# Patient Record
Sex: FEMALE | Race: BLACK OR AFRICAN AMERICAN | Employment: FULL TIME | URBAN - METROPOLITAN AREA
[De-identification: names, ages, dates, MRNs, and addresses within clinical notes are randomized per-mention and may not be internally consistent; named-entity substitution may affect disease eponyms.]

---

## 2018-08-18 ENCOUNTER — HOSPITAL ENCOUNTER (OUTPATIENT)
Age: 58
Discharge: HOME | End: 2018-08-18
Payer: COMMERCIAL

## 2018-08-18 LAB
ALT SERPL-CCNC: 14 U/L (ref 5–33)
ANION GAP SERPL CALCULATED.3IONS-SCNC: 10 MMOL/L (ref 9–17)
BUN BLDV-MCNC: 14 MG/DL (ref 6–20)
BUN/CREAT BLD: ABNORMAL (ref 9–20)
CALCIUM SERPL-MCNC: 8.8 MG/DL (ref 8.6–10.4)
CHLORIDE BLD-SCNC: 106 MMOL/L (ref 98–107)
CHOLESTEROL/HDL RATIO: 4.8
CHOLESTEROL: 183 MG/DL
CO2: 23 MMOL/L (ref 20–31)
CREAT SERPL-MCNC: 0.59 MG/DL (ref 0.5–0.9)
GFR AFRICAN AMERICAN: >60 ML/MIN
GFR NON-AFRICAN AMERICAN: >60 ML/MIN
GFR SERPL CREATININE-BSD FRML MDRD: ABNORMAL ML/MIN/{1.73_M2}
GFR SERPL CREATININE-BSD FRML MDRD: ABNORMAL ML/MIN/{1.73_M2}
GLUCOSE BLD-MCNC: 154 MG/DL (ref 70–99)
HDLC SERPL-MCNC: 38 MG/DL
LDL CHOLESTEROL: 120 MG/DL (ref 0–130)
POTASSIUM SERPL-SCNC: 4.3 MMOL/L (ref 3.7–5.3)
SODIUM BLD-SCNC: 139 MMOL/L (ref 135–144)
TRIGL SERPL-MCNC: 123 MG/DL
VLDLC SERPL CALC-MCNC: ABNORMAL MG/DL (ref 1–30)

## 2018-08-18 PROCEDURE — 80061 LIPID PANEL: CPT

## 2018-08-18 PROCEDURE — 84460 ALANINE AMINO (ALT) (SGPT): CPT

## 2018-08-18 PROCEDURE — 36415 COLL VENOUS BLD VENIPUNCTURE: CPT

## 2018-08-18 PROCEDURE — 80048 BASIC METABOLIC PNL TOTAL CA: CPT

## 2018-08-24 ENCOUNTER — HOSPITAL ENCOUNTER (OUTPATIENT)
Dept: VASCULAR LAB | Age: 58
Discharge: HOME | End: 2018-08-24
Payer: COMMERCIAL

## 2018-08-24 PROCEDURE — 93880 EXTRACRANIAL BILAT STUDY: CPT

## 2018-08-25 ENCOUNTER — HOSPITAL ENCOUNTER (OUTPATIENT)
Age: 58
Discharge: HOME | End: 2018-08-25
Payer: COMMERCIAL

## 2018-08-25 PROCEDURE — 83036 HEMOGLOBIN GLYCOSYLATED A1C: CPT

## 2018-08-25 PROCEDURE — 36415 COLL VENOUS BLD VENIPUNCTURE: CPT

## 2018-08-26 LAB
ESTIMATED AVERAGE GLUCOSE: 186 MG/DL
HBA1C MFR BLD: 8.1 % (ref 4–6)

## 2018-09-07 ENCOUNTER — TELEPHONE (OUTPATIENT)
Dept: GASTROENTEROLOGY | Age: 58
End: 2018-09-07

## 2018-09-17 ENCOUNTER — HOSPITAL ENCOUNTER (OUTPATIENT)
Dept: WOMENS IMAGING | Age: 58
Discharge: HOME | End: 2018-09-19
Payer: COMMERCIAL

## 2018-09-17 DIAGNOSIS — Z12.31 VISIT FOR SCREENING MAMMOGRAM: ICD-10-CM

## 2018-09-17 PROCEDURE — 77063 BREAST TOMOSYNTHESIS BI: CPT

## 2018-10-05 DIAGNOSIS — Z12.11 ENCOUNTER FOR SCREENING COLONOSCOPY: Primary | ICD-10-CM

## 2018-10-11 ENCOUNTER — TELEPHONE (OUTPATIENT)
Dept: GASTROENTEROLOGY | Age: 58
End: 2018-10-11

## 2018-10-11 NOTE — TELEPHONE ENCOUNTER
Writer TIMMY to call office back to confirm procedure for [email or web address] Jill Graham @new time of 1215pm and arrive by 1045am.

## 2018-10-12 ENCOUNTER — HOSPITAL ENCOUNTER (OUTPATIENT)
Age: 58
Setting detail: OUTPATIENT SURGERY
Discharge: HOME | End: 2018-10-12
Attending: INTERNAL MEDICINE | Admitting: INTERNAL MEDICINE
Payer: COMMERCIAL

## 2018-10-12 VITALS
OXYGEN SATURATION: 98 % | RESPIRATION RATE: 18 BRPM | HEART RATE: 68 BPM | TEMPERATURE: 97.9 F | HEIGHT: 60 IN | DIASTOLIC BLOOD PRESSURE: 67 MMHG | BODY MASS INDEX: 39.27 KG/M2 | SYSTOLIC BLOOD PRESSURE: 118 MMHG | WEIGHT: 200 LBS

## 2018-10-12 LAB — GLUCOSE BLD-MCNC: 87 MG/DL (ref 65–105)

## 2018-10-12 PROCEDURE — 82947 ASSAY GLUCOSE BLOOD QUANT: CPT

## 2018-10-12 PROCEDURE — 99152 MOD SED SAME PHYS/QHP 5/>YRS: CPT | Performed by: INTERNAL MEDICINE

## 2018-10-12 PROCEDURE — 2709999900 HC NON-CHARGEABLE SUPPLY: Performed by: INTERNAL MEDICINE

## 2018-10-12 PROCEDURE — 3609027000 HC COLONOSCOPY: Performed by: INTERNAL MEDICINE

## 2018-10-12 PROCEDURE — 6360000002 HC RX W HCPCS: Performed by: INTERNAL MEDICINE

## 2018-10-12 PROCEDURE — 99153 MOD SED SAME PHYS/QHP EA: CPT | Performed by: INTERNAL MEDICINE

## 2018-10-12 PROCEDURE — 7100000010 HC PHASE II RECOVERY - FIRST 15 MIN: Performed by: INTERNAL MEDICINE

## 2018-10-12 PROCEDURE — 2580000003 HC RX 258: Performed by: INTERNAL MEDICINE

## 2018-10-12 PROCEDURE — 7100000011 HC PHASE II RECOVERY - ADDTL 15 MIN: Performed by: INTERNAL MEDICINE

## 2018-10-12 RX ORDER — AMLODIPINE BESYLATE 5 MG/1
5 TABLET ORAL DAILY
COMMUNITY
End: 2019-07-01 | Stop reason: ALTCHOICE

## 2018-10-12 RX ORDER — FENTANYL CITRATE 50 UG/ML
INJECTION, SOLUTION INTRAMUSCULAR; INTRAVENOUS PRN
Status: DISCONTINUED | OUTPATIENT
Start: 2018-10-12 | End: 2018-10-12 | Stop reason: HOSPADM

## 2018-10-12 RX ORDER — SODIUM CHLORIDE 9 MG/ML
INJECTION, SOLUTION INTRAVENOUS CONTINUOUS
Status: DISCONTINUED | OUTPATIENT
Start: 2018-10-12 | End: 2018-10-12 | Stop reason: HOSPADM

## 2018-10-12 RX ORDER — MIDAZOLAM HYDROCHLORIDE 1 MG/ML
INJECTION INTRAMUSCULAR; INTRAVENOUS PRN
Status: DISCONTINUED | OUTPATIENT
Start: 2018-10-12 | End: 2018-10-12 | Stop reason: HOSPADM

## 2018-10-12 RX ADMIN — SODIUM CHLORIDE: 9 INJECTION, SOLUTION INTRAVENOUS at 10:46

## 2018-10-12 ASSESSMENT — PAIN - FUNCTIONAL ASSESSMENT: PAIN_FUNCTIONAL_ASSESSMENT: 0-10

## 2018-10-12 ASSESSMENT — PAIN SCALES - GENERAL
PAINLEVEL_OUTOF10: 0

## 2019-06-11 ENCOUNTER — OFFICE VISIT (OUTPATIENT)
Dept: FAMILY MEDICINE CLINIC | Age: 59
End: 2019-06-11
Payer: COMMERCIAL

## 2019-06-11 VITALS
DIASTOLIC BLOOD PRESSURE: 84 MMHG | SYSTOLIC BLOOD PRESSURE: 120 MMHG | WEIGHT: 196.4 LBS | BODY MASS INDEX: 38.56 KG/M2 | TEMPERATURE: 98.6 F | HEART RATE: 88 BPM | HEIGHT: 60 IN

## 2019-06-11 DIAGNOSIS — Z12.31 SCREENING MAMMOGRAM, ENCOUNTER FOR: ICD-10-CM

## 2019-06-11 DIAGNOSIS — E11.9 TYPE 2 DIABETES MELLITUS WITHOUT COMPLICATION, WITHOUT LONG-TERM CURRENT USE OF INSULIN (HCC): Primary | ICD-10-CM

## 2019-06-11 DIAGNOSIS — I10 ESSENTIAL HYPERTENSION: ICD-10-CM

## 2019-06-11 LAB
CREATININE URINE POCT: 300
HBA1C MFR BLD: 6.7 %
MICROALBUMIN/CREAT 24H UR: 30 MG/G{CREAT}
MICROALBUMIN/CREAT UR-RTO: <30

## 2019-06-11 PROCEDURE — 99203 OFFICE O/P NEW LOW 30 MIN: CPT | Performed by: FAMILY MEDICINE

## 2019-06-11 PROCEDURE — 83036 HEMOGLOBIN GLYCOSYLATED A1C: CPT | Performed by: FAMILY MEDICINE

## 2019-06-11 PROCEDURE — 82044 UR ALBUMIN SEMIQUANTITATIVE: CPT | Performed by: FAMILY MEDICINE

## 2019-06-11 PROCEDURE — G8427 DOCREV CUR MEDS BY ELIG CLIN: HCPCS | Performed by: FAMILY MEDICINE

## 2019-06-11 PROCEDURE — 3017F COLORECTAL CA SCREEN DOC REV: CPT | Performed by: FAMILY MEDICINE

## 2019-06-11 PROCEDURE — G8417 CALC BMI ABV UP PARAM F/U: HCPCS | Performed by: FAMILY MEDICINE

## 2019-06-11 PROCEDURE — 1036F TOBACCO NON-USER: CPT | Performed by: FAMILY MEDICINE

## 2019-06-11 PROCEDURE — 3044F HG A1C LEVEL LT 7.0%: CPT | Performed by: FAMILY MEDICINE

## 2019-06-11 PROCEDURE — 2022F DILAT RTA XM EVC RTNOPTHY: CPT | Performed by: FAMILY MEDICINE

## 2019-06-11 ASSESSMENT — PATIENT HEALTH QUESTIONNAIRE - PHQ9
1. LITTLE INTEREST OR PLEASURE IN DOING THINGS: 0
SUM OF ALL RESPONSES TO PHQ9 QUESTIONS 1 & 2: 0
SUM OF ALL RESPONSES TO PHQ QUESTIONS 1-9: 0
SUM OF ALL RESPONSES TO PHQ QUESTIONS 1-9: 0
2. FEELING DOWN, DEPRESSED OR HOPELESS: 0

## 2019-06-11 ASSESSMENT — ENCOUNTER SYMPTOMS
SHORTNESS OF BREATH: 0
NAUSEA: 0
SORE THROAT: 0
ABDOMINAL PAIN: 0

## 2019-06-11 NOTE — PROGRESS NOTES
Subjective:      Patient ID: Samuel Liu is a 63 y.o. female. Chronic Disease Visit Information    BP Readings from Last 3 Encounters:   06/11/19 120/84   10/12/18 118/67   11/09/15 138/78          Hemoglobin A1C (%)   Date Value   08/25/2018 8.1 (H)     LDL Cholesterol (mg/dL)   Date Value   08/18/2018 120     HDL (mg/dL)   Date Value   08/18/2018 38 (L)     BUN (mg/dL)   Date Value   08/18/2018 14     CREATININE (mg/dL)   Date Value   08/18/2018 0.59     Glucose (mg/dL)   Date Value   08/18/2018 154 (H)            Have you changed or started any medications since your last visit including any over-the-counter medicines, vitamins, or herbal medicines? no   Are you having any side effects from any of your medications? -  no  Have you stopped taking any of your medications? Is so, why? -  no    Have you seen any other physician or provider since your last visit? No  Have you had any other diagnostic tests since your last visit? No  Have you been seen in the emergency room and/or had an admission to a hospital since we last saw you? No  Have you had your annual diabetic retinal (eye) exam? Yes - Records Requested  Have you had your routine dental cleaning in the past 6 months? yes -     Have you activated your [employer] account? If not, what are your barriers?  No:      Patient Care Team:  Nicolasa Delcid MD as PCP - General (Family Medicine)      Health Maintenance   Topic Date Due    Hepatitis C screen  1960    Pneumococcal 0-64 years Vaccine (1 of 1 - PPSV23) 06/03/1967    Diabetic foot exam  06/03/1971    Diabetic retinal exam  06/03/1971    HIV screen  06/03/1976    Diabetic microalbuminuria test  06/03/1979    Hepatitis B Vaccine (1 of 3 - Risk 3-dose series) 06/03/1980    DTaP/Tdap/Td vaccine (1 - Tdap) 06/03/1980    Cervical cancer screen  06/03/1982    Shingles Vaccine (1 of 2) 06/03/2011    Lipid screen  08/18/2019    A1C test (Diabetic or Prediabetic)  08/25/2019    Flu vaccine (Season without long-term current use of insulin (HCC)   controlled  HM DIABETES FOOT EXAM   2. Essential hypertension   controlled  Lipid Panel    EKG 12 Lead   3. Screening mammogram, encounter for  JOS DIGITAL SCREEN W CAD BILATERAL             Plan:       Orders Placed This Encounter   Procedures    JOS DIGITAL SCREEN W CAD BILATERAL     Standing Status:   Future     Standing Expiration Date:   6/11/2020    Lipid Panel     Standing Status:   Future     Standing Expiration Date:   6/10/2020     Order Specific Question:   Is Patient Fasting?/# of Hours     Answer:   12    Comprehensive Metabolic Panel     Standing Status:   Future     Standing Expiration Date:   6/10/2020    POCT glycosylated hemoglobin (Hb A1C)    POCT microalbumin    EKG 12 Lead     Standing Status:   Future     Standing Expiration Date:   6/11/2020     Order Specific Question:   Reason for Exam?     Answer:   Hypertension    HM DIABETES FOOT EXAM     No orders of the defined types were placed in this encounter. Return in about 4 months (around 10/11/2019) for diabetes. Continue current medications reviewed from the chart    .           Marichuy Marquez MA

## 2019-07-01 ENCOUNTER — TELEPHONE (OUTPATIENT)
Dept: FAMILY MEDICINE CLINIC | Age: 59
End: 2019-07-01

## 2019-07-01 RX ORDER — LISINOPRIL 5 MG/1
5 TABLET ORAL DAILY
Qty: 30 TABLET | Refills: 2 | Status: SHIPPED | OUTPATIENT
Start: 2019-07-01 | End: 2019-09-30 | Stop reason: SDUPTHER

## 2019-07-06 ENCOUNTER — HOSPITAL ENCOUNTER (OUTPATIENT)
Age: 59
Discharge: HOME | End: 2019-07-06
Payer: COMMERCIAL

## 2019-07-06 DIAGNOSIS — E11.9 TYPE 2 DIABETES MELLITUS WITHOUT COMPLICATION, WITHOUT LONG-TERM CURRENT USE OF INSULIN (HCC): ICD-10-CM

## 2019-07-06 DIAGNOSIS — I10 ESSENTIAL HYPERTENSION: ICD-10-CM

## 2019-07-06 LAB
ALBUMIN SERPL-MCNC: 3.7 G/DL (ref 3.5–5.2)
ALBUMIN/GLOBULIN RATIO: ABNORMAL (ref 1–2.5)
ALP BLD-CCNC: 94 U/L (ref 35–104)
ALT SERPL-CCNC: 12 U/L (ref 5–33)
ANION GAP SERPL CALCULATED.3IONS-SCNC: 11 MMOL/L (ref 9–17)
AST SERPL-CCNC: 19 U/L
BILIRUB SERPL-MCNC: 0.43 MG/DL (ref 0.3–1.2)
BUN BLDV-MCNC: 16 MG/DL (ref 6–20)
BUN/CREAT BLD: ABNORMAL (ref 9–20)
CALCIUM SERPL-MCNC: 9.1 MG/DL (ref 8.6–10.4)
CHLORIDE BLD-SCNC: 106 MMOL/L (ref 98–107)
CHOLESTEROL/HDL RATIO: 4.4
CHOLESTEROL: 168 MG/DL
CO2: 23 MMOL/L (ref 20–31)
CREAT SERPL-MCNC: 0.54 MG/DL (ref 0.5–0.9)
GFR AFRICAN AMERICAN: >60 ML/MIN
GFR NON-AFRICAN AMERICAN: >60 ML/MIN
GFR SERPL CREATININE-BSD FRML MDRD: ABNORMAL ML/MIN/{1.73_M2}
GFR SERPL CREATININE-BSD FRML MDRD: ABNORMAL ML/MIN/{1.73_M2}
GLUCOSE BLD-MCNC: 115 MG/DL (ref 70–99)
HDLC SERPL-MCNC: 38 MG/DL
LDL CHOLESTEROL: 105 MG/DL (ref 0–130)
POTASSIUM SERPL-SCNC: 4.8 MMOL/L (ref 3.7–5.3)
SODIUM BLD-SCNC: 140 MMOL/L (ref 135–144)
TOTAL PROTEIN: 7.3 G/DL (ref 6.4–8.3)
TRIGL SERPL-MCNC: 123 MG/DL
VITAMIN D 25-HYDROXY: 16.4 NG/ML (ref 30–100)
VLDLC SERPL CALC-MCNC: ABNORMAL MG/DL (ref 1–30)

## 2019-07-06 PROCEDURE — 36415 COLL VENOUS BLD VENIPUNCTURE: CPT

## 2019-07-06 PROCEDURE — 80053 COMPREHEN METABOLIC PANEL: CPT

## 2019-07-06 PROCEDURE — 82306 VITAMIN D 25 HYDROXY: CPT

## 2019-07-06 PROCEDURE — 80061 LIPID PANEL: CPT

## 2019-07-08 RX ORDER — ERGOCALCIFEROL 1.25 MG/1
50000 CAPSULE ORAL WEEKLY
Qty: 12 CAPSULE | Refills: 0 | Status: SHIPPED | OUTPATIENT
Start: 2019-07-08 | End: 2019-10-17

## 2019-07-27 ENCOUNTER — HOSPITAL ENCOUNTER (OUTPATIENT)
Age: 59
Discharge: HOME | End: 2019-07-29
Payer: COMMERCIAL

## 2019-07-27 DIAGNOSIS — I10 ESSENTIAL HYPERTENSION: ICD-10-CM

## 2019-07-27 PROCEDURE — 93005 ELECTROCARDIOGRAM TRACING: CPT

## 2019-07-30 LAB
EKG ATRIAL RATE: 75 BPM
EKG P AXIS: 47 DEGREES
EKG P-R INTERVAL: 178 MS
EKG Q-T INTERVAL: 426 MS
EKG QRS DURATION: 120 MS
EKG QTC CALCULATION (BAZETT): 475 MS
EKG R AXIS: 90 DEGREES
EKG T AXIS: 33 DEGREES
EKG VENTRICULAR RATE: 75 BPM

## 2019-07-30 PROCEDURE — 93010 ELECTROCARDIOGRAM REPORT: CPT | Performed by: INTERNAL MEDICINE

## 2019-08-01 ENCOUNTER — TELEPHONE (OUTPATIENT)
Dept: FAMILY MEDICINE CLINIC | Age: 59
End: 2019-08-01

## 2019-08-01 DIAGNOSIS — R94.31 ABNORMAL EKG: Primary | ICD-10-CM

## 2019-08-09 ENCOUNTER — HOSPITAL ENCOUNTER (OUTPATIENT)
Dept: NUCLEAR MEDICINE | Age: 59
Discharge: HOME | End: 2019-08-11
Payer: COMMERCIAL

## 2019-08-09 ENCOUNTER — HOSPITAL ENCOUNTER (OUTPATIENT)
Dept: NON INVASIVE DIAGNOSTICS | Age: 59
Discharge: HOME | End: 2019-08-09
Payer: COMMERCIAL

## 2019-08-09 ENCOUNTER — TELEPHONE (OUTPATIENT)
Dept: FAMILY MEDICINE CLINIC | Age: 59
End: 2019-08-09

## 2019-08-09 VITALS — BODY MASS INDEX: 38.28 KG/M2 | HEIGHT: 60 IN | WEIGHT: 195 LBS

## 2019-08-09 DIAGNOSIS — R94.31 ABNORMAL EKG: ICD-10-CM

## 2019-08-09 DIAGNOSIS — R94.31 ABNORMAL ELECTROCARDIOGRAM: ICD-10-CM

## 2019-08-09 LAB
LV EF: 36 %
LVEF MODALITY: NORMAL

## 2019-08-09 PROCEDURE — 78452 HT MUSCLE IMAGE SPECT MULT: CPT

## 2019-08-09 PROCEDURE — 93017 CV STRESS TEST TRACING ONLY: CPT

## 2019-08-09 PROCEDURE — 3430000000 HC RX DIAGNOSTIC RADIOPHARMACEUTICAL: Performed by: FAMILY MEDICINE

## 2019-08-09 PROCEDURE — 2580000003 HC RX 258: Performed by: FAMILY MEDICINE

## 2019-08-09 PROCEDURE — A9500 TC99M SESTAMIBI: HCPCS | Performed by: FAMILY MEDICINE

## 2019-08-09 PROCEDURE — 6360000002 HC RX W HCPCS: Performed by: FAMILY MEDICINE

## 2019-08-09 RX ORDER — ALBUTEROL SULFATE 90 UG/1
2 AEROSOL, METERED RESPIRATORY (INHALATION) PRN
Status: ACTIVE | OUTPATIENT
Start: 2019-08-09 | End: 2019-08-09

## 2019-08-09 RX ORDER — SODIUM CHLORIDE 0.9 % (FLUSH) 0.9 %
10 SYRINGE (ML) INJECTION PRN
Status: ACTIVE | OUTPATIENT
Start: 2019-08-09 | End: 2019-08-09

## 2019-08-09 RX ORDER — AMINOPHYLLINE DIHYDRATE 25 MG/ML
50 INJECTION, SOLUTION INTRAVENOUS PRN
Status: ACTIVE | OUTPATIENT
Start: 2019-08-09 | End: 2019-08-09

## 2019-08-09 RX ORDER — METOPROLOL TARTRATE 5 MG/5ML
5 INJECTION INTRAVENOUS EVERY 5 MIN PRN
Status: ACTIVE | OUTPATIENT
Start: 2019-08-09 | End: 2019-08-09

## 2019-08-09 RX ORDER — SODIUM CHLORIDE 9 MG/ML
500 INJECTION, SOLUTION INTRAVENOUS CONTINUOUS PRN
Status: ACTIVE | OUTPATIENT
Start: 2019-08-09 | End: 2019-08-09

## 2019-08-09 RX ORDER — ATROPINE SULFATE 0.1 MG/ML
0.5 INJECTION INTRAVENOUS EVERY 5 MIN PRN
Status: ACTIVE | OUTPATIENT
Start: 2019-08-09 | End: 2019-08-09

## 2019-08-09 RX ORDER — NITROGLYCERIN 0.4 MG/1
0.4 TABLET SUBLINGUAL EVERY 5 MIN PRN
Status: ACTIVE | OUTPATIENT
Start: 2019-08-09 | End: 2019-08-09

## 2019-08-09 RX ORDER — SODIUM CHLORIDE 0.9 % (FLUSH) 0.9 %
10 SYRINGE (ML) INJECTION PRN
Status: DISCONTINUED | OUTPATIENT
Start: 2019-08-09 | End: 2019-08-12 | Stop reason: HOSPADM

## 2019-08-09 RX ADMIN — TETRAKIS(2-METHOXYISOBUTYLISOCYANIDE)COPPER(I) TETRAFLUOROBORATE 35.8 MILLICURIE: 1 INJECTION, POWDER, LYOPHILIZED, FOR SOLUTION INTRAVENOUS at 09:29

## 2019-08-09 RX ADMIN — REGADENOSON 0.4 MG: 0.08 INJECTION, SOLUTION INTRAVENOUS at 09:28

## 2019-08-09 RX ADMIN — Medication 10 ML: at 08:47

## 2019-08-09 RX ADMIN — TETRAKIS(2-METHOXYISOBUTYLISOCYANIDE)COPPER(I) TETRAFLUOROBORATE 12.4 MILLICURIE: 1 INJECTION, POWDER, LYOPHILIZED, FOR SOLUTION INTRAVENOUS at 07:19

## 2019-08-09 RX ADMIN — Medication 10 ML: at 07:19

## 2019-08-09 RX ADMIN — Medication 10 ML: at 09:28

## 2019-08-09 NOTE — PROGRESS NOTES
PATIENT EXPLAINED LEXISCAN AND CONSENT SIGNED, PLACED ON EKG AND VITALS MACHINE, IV FLUSHED WITH NORMAL SALINE, DENIES CHEST PAIN AND SHORTNESS OF BREATH. PATIENT IS NSR ON MONITOR. /81, HR 67.

## 2019-08-09 NOTE — TELEPHONE ENCOUNTER
Radiology Department from [facility] called to verify that stress test results were received. Results our in the computer and message was sent to Dr. Jennifer Olson.   Called pt left her message to call me will discuss stress test results

## 2019-08-12 NOTE — TELEPHONE ENCOUNTER
Per Dr Asim Haro, I called and spoke with patient and let her know that Dr Angela Pastor office will be calling her to schedule an appointment as her stress test was positive.

## 2019-09-23 ENCOUNTER — HOSPITAL ENCOUNTER (OUTPATIENT)
Dept: WOMENS IMAGING | Age: 59
Discharge: HOME | End: 2019-09-25
Payer: COMMERCIAL

## 2019-09-23 DIAGNOSIS — Z12.31 SCREENING MAMMOGRAM, ENCOUNTER FOR: ICD-10-CM

## 2019-09-23 PROCEDURE — 77063 BREAST TOMOSYNTHESIS BI: CPT

## 2019-09-30 RX ORDER — LISINOPRIL 5 MG/1
5 TABLET ORAL DAILY
Qty: 30 TABLET | Refills: 2 | Status: SHIPPED | OUTPATIENT
Start: 2019-09-30 | End: 2019-11-18 | Stop reason: ALTCHOICE

## 2019-09-30 NOTE — TELEPHONE ENCOUNTER
Please Approve or Refuse.   Send to Pharmacy per Pt's Request:      Next Visit Date:  10/17/2019   Last Visit Date: 6/11/2019    Hemoglobin A1C (%)   Date Value   06/11/2019 6.7   08/25/2018 8.1 (H)             ( goal A1C is < 7)   BP Readings from Last 3 Encounters:   06/11/19 120/84   10/12/18 118/67   11/09/15 138/78          (goal 120/80)  BUN   Date Value Ref Range Status   07/06/2019 16 6 - 20 mg/dL Final     CREATININE   Date Value Ref Range Status   07/06/2019 0.54 0.50 - 0.90 mg/dL Final     Potassium   Date Value Ref Range Status   07/06/2019 4.8 3.7 - 5.3 mmol/L Final

## 2019-10-17 ENCOUNTER — OFFICE VISIT (OUTPATIENT)
Dept: FAMILY MEDICINE CLINIC | Age: 59
End: 2019-10-17
Payer: COMMERCIAL

## 2019-10-17 VITALS
SYSTOLIC BLOOD PRESSURE: 131 MMHG | HEIGHT: 60 IN | RESPIRATION RATE: 16 BRPM | WEIGHT: 191.6 LBS | OXYGEN SATURATION: 99 % | HEART RATE: 85 BPM | BODY MASS INDEX: 37.62 KG/M2 | TEMPERATURE: 97.4 F | DIASTOLIC BLOOD PRESSURE: 79 MMHG

## 2019-10-17 DIAGNOSIS — I10 ESSENTIAL HYPERTENSION: ICD-10-CM

## 2019-10-17 DIAGNOSIS — E11.9 TYPE 2 DIABETES MELLITUS WITHOUT COMPLICATION, WITHOUT LONG-TERM CURRENT USE OF INSULIN (HCC): Primary | ICD-10-CM

## 2019-10-17 LAB — HBA1C MFR BLD: 4.7 %

## 2019-10-17 PROCEDURE — 99214 OFFICE O/P EST MOD 30 MIN: CPT | Performed by: FAMILY MEDICINE

## 2019-10-17 PROCEDURE — G8417 CALC BMI ABV UP PARAM F/U: HCPCS | Performed by: FAMILY MEDICINE

## 2019-10-17 PROCEDURE — G8427 DOCREV CUR MEDS BY ELIG CLIN: HCPCS | Performed by: FAMILY MEDICINE

## 2019-10-17 PROCEDURE — 3017F COLORECTAL CA SCREEN DOC REV: CPT | Performed by: FAMILY MEDICINE

## 2019-10-17 PROCEDURE — 1036F TOBACCO NON-USER: CPT | Performed by: FAMILY MEDICINE

## 2019-10-17 PROCEDURE — 2022F DILAT RTA XM EVC RTNOPTHY: CPT | Performed by: FAMILY MEDICINE

## 2019-10-17 PROCEDURE — 83036 HEMOGLOBIN GLYCOSYLATED A1C: CPT | Performed by: FAMILY MEDICINE

## 2019-10-17 PROCEDURE — G8484 FLU IMMUNIZE NO ADMIN: HCPCS | Performed by: FAMILY MEDICINE

## 2019-10-17 PROCEDURE — 3044F HG A1C LEVEL LT 7.0%: CPT | Performed by: FAMILY MEDICINE

## 2019-10-17 RX ORDER — CARVEDILOL 3.12 MG/1
6.25 TABLET ORAL 2 TIMES DAILY
COMMUNITY
Start: 2019-08-15 | End: 2020-02-17

## 2019-10-17 RX ORDER — ATORVASTATIN CALCIUM 20 MG/1
20 TABLET, FILM COATED ORAL
COMMUNITY
Start: 2019-08-15

## 2019-10-17 RX ORDER — ERGOCALCIFEROL (VITAMIN D2) 1250 MCG
50000 CAPSULE ORAL
COMMUNITY
Start: 2019-07-08 | End: 2019-10-17

## 2019-10-17 ASSESSMENT — ENCOUNTER SYMPTOMS
SORE THROAT: 0
SHORTNESS OF BREATH: 0
ABDOMINAL PAIN: 0
NAUSEA: 0

## 2019-10-28 ENCOUNTER — HOSPITAL ENCOUNTER (OUTPATIENT)
Dept: OTHER | Age: 59
Discharge: HOME | End: 2019-10-28
Payer: COMMERCIAL

## 2019-10-28 VITALS
WEIGHT: 193 LBS | SYSTOLIC BLOOD PRESSURE: 146 MMHG | BODY MASS INDEX: 37.89 KG/M2 | DIASTOLIC BLOOD PRESSURE: 90 MMHG | RESPIRATION RATE: 16 BRPM | HEART RATE: 76 BPM | OXYGEN SATURATION: 100 % | HEIGHT: 60 IN

## 2019-10-28 LAB
ANION GAP SERPL CALCULATED.3IONS-SCNC: 11 MMOL/L (ref 9–17)
BNP INTERPRETATION: NORMAL
BUN BLDV-MCNC: 14 MG/DL (ref 6–20)
CHLORIDE BLD-SCNC: 108 MMOL/L (ref 98–107)
CO2: 25 MMOL/L (ref 20–31)
CREAT SERPL-MCNC: 0.64 MG/DL (ref 0.5–0.9)
GFR AFRICAN AMERICAN: >60 ML/MIN
GFR NON-AFRICAN AMERICAN: >60 ML/MIN
GFR SERPL CREATININE-BSD FRML MDRD: NORMAL ML/MIN/{1.73_M2}
GFR SERPL CREATININE-BSD FRML MDRD: NORMAL ML/MIN/{1.73_M2}
POTASSIUM SERPL-SCNC: 4.9 MMOL/L (ref 3.7–5.3)
PRO-BNP: 126 PG/ML
SODIUM BLD-SCNC: 144 MMOL/L (ref 135–144)

## 2019-10-28 PROCEDURE — 82565 ASSAY OF CREATININE: CPT

## 2019-10-28 PROCEDURE — 83880 ASSAY OF NATRIURETIC PEPTIDE: CPT

## 2019-10-28 PROCEDURE — 99212 OFFICE O/P EST SF 10 MIN: CPT

## 2019-10-28 PROCEDURE — 84520 ASSAY OF UREA NITROGEN: CPT

## 2019-10-28 PROCEDURE — 80051 ELECTROLYTE PANEL: CPT

## 2019-10-28 PROCEDURE — 36415 COLL VENOUS BLD VENIPUNCTURE: CPT

## 2019-10-28 RX ORDER — ACETAMINOPHEN 160 MG
2000 TABLET,DISINTEGRATING ORAL
COMMUNITY

## 2019-10-28 ASSESSMENT — EJECTION FRACTION: EF_VALUE: 36%

## 2019-11-18 ENCOUNTER — HOSPITAL ENCOUNTER (OUTPATIENT)
Dept: OTHER | Age: 59
Discharge: HOME | End: 2019-11-18
Payer: COMMERCIAL

## 2019-11-18 ENCOUNTER — HOSPITAL ENCOUNTER (OUTPATIENT)
Age: 59
Discharge: HOME | End: 2019-11-18
Payer: COMMERCIAL

## 2019-11-18 VITALS
RESPIRATION RATE: 18 BRPM | BODY MASS INDEX: 37.17 KG/M2 | WEIGHT: 190.3 LBS | OXYGEN SATURATION: 99 % | DIASTOLIC BLOOD PRESSURE: 76 MMHG | HEART RATE: 86 BPM | SYSTOLIC BLOOD PRESSURE: 132 MMHG

## 2019-11-18 LAB
ANION GAP SERPL CALCULATED.3IONS-SCNC: 14 MMOL/L (ref 9–17)
BNP INTERPRETATION: NORMAL
BUN BLDV-MCNC: 15 MG/DL (ref 6–20)
BUN/CREAT BLD: ABNORMAL (ref 9–20)
CALCIUM SERPL-MCNC: 9.5 MG/DL (ref 8.6–10.4)
CHLORIDE BLD-SCNC: 107 MMOL/L (ref 98–107)
CO2: 20 MMOL/L (ref 20–31)
CREAT SERPL-MCNC: 0.59 MG/DL (ref 0.5–0.9)
GFR AFRICAN AMERICAN: >60 ML/MIN
GFR NON-AFRICAN AMERICAN: >60 ML/MIN
GFR SERPL CREATININE-BSD FRML MDRD: ABNORMAL ML/MIN/{1.73_M2}
GFR SERPL CREATININE-BSD FRML MDRD: ABNORMAL ML/MIN/{1.73_M2}
GLUCOSE BLD-MCNC: 111 MG/DL (ref 70–99)
POTASSIUM SERPL-SCNC: 4.5 MMOL/L (ref 3.7–5.3)
PRO-BNP: 97 PG/ML
SODIUM BLD-SCNC: 141 MMOL/L (ref 135–144)

## 2019-11-18 PROCEDURE — 80048 BASIC METABOLIC PNL TOTAL CA: CPT

## 2019-11-18 PROCEDURE — 99211 OFF/OP EST MAY X REQ PHY/QHP: CPT

## 2019-11-18 PROCEDURE — 83880 ASSAY OF NATRIURETIC PEPTIDE: CPT

## 2019-11-18 PROCEDURE — 36415 COLL VENOUS BLD VENIPUNCTURE: CPT

## 2019-11-25 ENCOUNTER — HOSPITAL ENCOUNTER (OUTPATIENT)
Dept: OTHER | Age: 59
Discharge: HOME | End: 2019-11-25
Payer: COMMERCIAL

## 2019-11-25 ENCOUNTER — HOSPITAL ENCOUNTER (OUTPATIENT)
Age: 59
Discharge: HOME | End: 2019-11-25
Payer: COMMERCIAL

## 2019-11-25 VITALS
DIASTOLIC BLOOD PRESSURE: 72 MMHG | SYSTOLIC BLOOD PRESSURE: 126 MMHG | WEIGHT: 191.6 LBS | RESPIRATION RATE: 20 BRPM | BODY MASS INDEX: 37.42 KG/M2 | HEART RATE: 96 BPM | OXYGEN SATURATION: 97 %

## 2019-11-25 LAB
ANION GAP SERPL CALCULATED.3IONS-SCNC: 12 MMOL/L (ref 9–17)
BNP INTERPRETATION: NORMAL
BUN BLDV-MCNC: 14 MG/DL (ref 6–20)
BUN/CREAT BLD: ABNORMAL (ref 9–20)
CALCIUM SERPL-MCNC: 9.3 MG/DL (ref 8.6–10.4)
CHLORIDE BLD-SCNC: 107 MMOL/L (ref 98–107)
CO2: 18 MMOL/L (ref 20–31)
CREAT SERPL-MCNC: 0.6 MG/DL (ref 0.5–0.9)
GFR AFRICAN AMERICAN: >60 ML/MIN
GFR NON-AFRICAN AMERICAN: >60 ML/MIN
GFR SERPL CREATININE-BSD FRML MDRD: ABNORMAL ML/MIN/{1.73_M2}
GFR SERPL CREATININE-BSD FRML MDRD: ABNORMAL ML/MIN/{1.73_M2}
GLUCOSE BLD-MCNC: 133 MG/DL (ref 70–99)
POTASSIUM SERPL-SCNC: 4.2 MMOL/L (ref 3.7–5.3)
PRO-BNP: 51 PG/ML
SODIUM BLD-SCNC: 137 MMOL/L (ref 135–144)

## 2019-11-25 PROCEDURE — 99211 OFF/OP EST MAY X REQ PHY/QHP: CPT

## 2019-11-25 PROCEDURE — 83880 ASSAY OF NATRIURETIC PEPTIDE: CPT

## 2019-11-25 PROCEDURE — 80048 BASIC METABOLIC PNL TOTAL CA: CPT

## 2019-12-05 ENCOUNTER — HOSPITAL ENCOUNTER (OUTPATIENT)
Age: 59
Discharge: HOME | End: 2019-12-05
Payer: COMMERCIAL

## 2019-12-05 ENCOUNTER — HOSPITAL ENCOUNTER (OUTPATIENT)
Dept: OTHER | Age: 59
Discharge: HOME | End: 2019-12-05
Payer: COMMERCIAL

## 2019-12-05 VITALS
HEART RATE: 76 BPM | SYSTOLIC BLOOD PRESSURE: 122 MMHG | BODY MASS INDEX: 37.32 KG/M2 | WEIGHT: 191.1 LBS | OXYGEN SATURATION: 100 % | DIASTOLIC BLOOD PRESSURE: 76 MMHG | RESPIRATION RATE: 18 BRPM

## 2019-12-05 LAB
ANION GAP SERPL CALCULATED.3IONS-SCNC: 12 MMOL/L (ref 9–17)
BUN BLDV-MCNC: 15 MG/DL (ref 6–20)
BUN/CREAT BLD: ABNORMAL (ref 9–20)
CALCIUM SERPL-MCNC: 10.2 MG/DL (ref 8.6–10.4)
CHLORIDE BLD-SCNC: 103 MMOL/L (ref 98–107)
CO2: 25 MMOL/L (ref 20–31)
CREAT SERPL-MCNC: 0.62 MG/DL (ref 0.5–0.9)
GFR AFRICAN AMERICAN: >60 ML/MIN
GFR NON-AFRICAN AMERICAN: >60 ML/MIN
GFR SERPL CREATININE-BSD FRML MDRD: ABNORMAL ML/MIN/{1.73_M2}
GFR SERPL CREATININE-BSD FRML MDRD: ABNORMAL ML/MIN/{1.73_M2}
GLUCOSE BLD-MCNC: 127 MG/DL (ref 70–99)
POTASSIUM SERPL-SCNC: 4.6 MMOL/L (ref 3.7–5.3)
SODIUM BLD-SCNC: 140 MMOL/L (ref 135–144)

## 2019-12-05 PROCEDURE — 99211 OFF/OP EST MAY X REQ PHY/QHP: CPT

## 2019-12-05 PROCEDURE — 36415 COLL VENOUS BLD VENIPUNCTURE: CPT

## 2019-12-05 PROCEDURE — 80048 BASIC METABOLIC PNL TOTAL CA: CPT

## 2019-12-30 ENCOUNTER — HOSPITAL ENCOUNTER (OUTPATIENT)
Dept: OTHER | Age: 59
Discharge: HOME | End: 2019-12-30
Payer: COMMERCIAL

## 2019-12-30 VITALS
SYSTOLIC BLOOD PRESSURE: 122 MMHG | RESPIRATION RATE: 16 BRPM | HEIGHT: 60 IN | WEIGHT: 193.3 LBS | DIASTOLIC BLOOD PRESSURE: 72 MMHG | OXYGEN SATURATION: 100 % | BODY MASS INDEX: 37.95 KG/M2

## 2019-12-30 PROCEDURE — 99211 OFF/OP EST MAY X REQ PHY/QHP: CPT

## 2020-02-17 ENCOUNTER — HOSPITAL ENCOUNTER (OUTPATIENT)
Dept: OTHER | Age: 60
Discharge: HOME | End: 2020-02-17
Payer: COMMERCIAL

## 2020-02-17 VITALS
BODY MASS INDEX: 37.36 KG/M2 | WEIGHT: 191.3 LBS | DIASTOLIC BLOOD PRESSURE: 64 MMHG | SYSTOLIC BLOOD PRESSURE: 122 MMHG | OXYGEN SATURATION: 97 % | HEART RATE: 77 BPM | RESPIRATION RATE: 16 BRPM

## 2020-02-17 LAB
ANION GAP SERPL CALCULATED.3IONS-SCNC: 14 MMOL/L (ref 9–17)
BUN BLDV-MCNC: 19 MG/DL (ref 6–20)
CHLORIDE BLD-SCNC: 105 MMOL/L (ref 98–107)
CO2: 23 MMOL/L (ref 20–31)
CREAT SERPL-MCNC: 0.74 MG/DL (ref 0.5–0.9)
GFR AFRICAN AMERICAN: >60 ML/MIN
GFR NON-AFRICAN AMERICAN: >60 ML/MIN
GFR SERPL CREATININE-BSD FRML MDRD: NORMAL ML/MIN/{1.73_M2}
GFR SERPL CREATININE-BSD FRML MDRD: NORMAL ML/MIN/{1.73_M2}
POTASSIUM SERPL-SCNC: 4.5 MMOL/L (ref 3.7–5.3)
SODIUM BLD-SCNC: 142 MMOL/L (ref 135–144)

## 2020-02-17 PROCEDURE — 99211 OFF/OP EST MAY X REQ PHY/QHP: CPT

## 2020-02-17 PROCEDURE — 82565 ASSAY OF CREATININE: CPT

## 2020-02-17 PROCEDURE — 80051 ELECTROLYTE PANEL: CPT

## 2020-02-17 PROCEDURE — 84520 ASSAY OF UREA NITROGEN: CPT

## 2020-02-17 PROCEDURE — 36415 COLL VENOUS BLD VENIPUNCTURE: CPT

## 2020-02-17 RX ORDER — CARVEDILOL 12.5 MG/1
12.5 TABLET ORAL 2 TIMES DAILY WITH MEALS
COMMUNITY

## 2020-02-18 ENCOUNTER — OFFICE VISIT (OUTPATIENT)
Dept: FAMILY MEDICINE CLINIC | Age: 60
End: 2020-02-18
Payer: COMMERCIAL

## 2020-02-18 VITALS
HEART RATE: 67 BPM | DIASTOLIC BLOOD PRESSURE: 84 MMHG | SYSTOLIC BLOOD PRESSURE: 128 MMHG | WEIGHT: 192.8 LBS | OXYGEN SATURATION: 97 % | HEIGHT: 60 IN | BODY MASS INDEX: 37.85 KG/M2

## 2020-02-18 PROBLEM — I42.0 NONISCHEMIC DILATED CARDIOMYOPATHY (HCC): Status: ACTIVE | Noted: 2020-02-18

## 2020-02-18 PROBLEM — E78.5 HYPERLIPIDEMIA: Status: ACTIVE | Noted: 2020-02-18

## 2020-02-18 LAB — HBA1C MFR BLD: 6.7 %

## 2020-02-18 PROCEDURE — 1036F TOBACCO NON-USER: CPT | Performed by: FAMILY MEDICINE

## 2020-02-18 PROCEDURE — 2022F DILAT RTA XM EVC RTNOPTHY: CPT | Performed by: FAMILY MEDICINE

## 2020-02-18 PROCEDURE — 3044F HG A1C LEVEL LT 7.0%: CPT | Performed by: FAMILY MEDICINE

## 2020-02-18 PROCEDURE — G8417 CALC BMI ABV UP PARAM F/U: HCPCS | Performed by: FAMILY MEDICINE

## 2020-02-18 PROCEDURE — 3017F COLORECTAL CA SCREEN DOC REV: CPT | Performed by: FAMILY MEDICINE

## 2020-02-18 PROCEDURE — G8484 FLU IMMUNIZE NO ADMIN: HCPCS | Performed by: FAMILY MEDICINE

## 2020-02-18 PROCEDURE — 83036 HEMOGLOBIN GLYCOSYLATED A1C: CPT | Performed by: FAMILY MEDICINE

## 2020-02-18 PROCEDURE — 99214 OFFICE O/P EST MOD 30 MIN: CPT | Performed by: FAMILY MEDICINE

## 2020-02-18 PROCEDURE — G8427 DOCREV CUR MEDS BY ELIG CLIN: HCPCS | Performed by: FAMILY MEDICINE

## 2020-02-18 ASSESSMENT — PATIENT HEALTH QUESTIONNAIRE - PHQ9
SUM OF ALL RESPONSES TO PHQ QUESTIONS 1-9: 0
2. FEELING DOWN, DEPRESSED OR HOPELESS: 0
1. LITTLE INTEREST OR PLEASURE IN DOING THINGS: 0
SUM OF ALL RESPONSES TO PHQ QUESTIONS 1-9: 0
SUM OF ALL RESPONSES TO PHQ9 QUESTIONS 1 & 2: 0

## 2020-02-18 ASSESSMENT — ENCOUNTER SYMPTOMS
NAUSEA: 0
SHORTNESS OF BREATH: 0
ABDOMINAL PAIN: 0
SORE THROAT: 0

## 2020-02-18 NOTE — PROGRESS NOTES
Subjective:      Patient ID: Yoko Lujan is a 63 y.o. female. Visit Information    Have you changed or started any medications since your last visit including any over-the-counter medicines, vitamins, or herbal medicines? no   Are you having any side effects from any of your medications? -  no  Have you stopped taking any of your medications? Is so, why? -  no    Have you seen any other physician or provider since your last visit? Yes - Records Obtained  Have you had any other diagnostic tests since your last visit? Yes - Records Obtained  Have you been seen in the emergency room and/or had an admission to a hospital since we last saw you? No  Have you had your routine dental cleaning in the past 6 months? no    Have you activated your [employer] account? If not, what are your barriers?  No:      Patient Care Team:  Farhat Baptiste MD as PCP - General (Family Medicine)  Farhat Baptiste MD as PCP - [facility]    Medical History Review  Past Medical, Family, and Social History reviewed and does contribute to the patient presenting condition    Health Maintenance   Topic Date Due    Hepatitis C screen  1960    HIV screen  06/03/1976    Hepatitis B vaccine (1 of 3 - Risk 3-dose series) 06/03/1980    Cervical cancer screen  06/03/1982    Diabetic retinal exam  02/22/2020    Pneumococcal 0-64 years Vaccine (1 of 1 - PPSV23) 03/18/2020 (Originally 6/3/1967)    Flu vaccine (1) 06/30/2020 (Originally 9/1/2019)    DTaP/Tdap/Td vaccine (1 - Tdap) 02/18/2021 (Originally 6/3/1972)    Shingles Vaccine (1 of 2) 02/18/2021 (Originally 6/3/2011)    Diabetic foot exam  06/11/2020    Diabetic microalbuminuria test  06/11/2020    Lipid screen  07/06/2020    A1C test (Diabetic or Prediabetic)  10/17/2020    Potassium monitoring  02/17/2021    Creatinine monitoring  02/17/2021    Breast cancer screen  09/23/2021    Colon cancer screen colonoscopy  10/12/2028    Hepatitis A vaccine  Aged Out    Hib HB A1c is 6.7    Assessment:        Diagnosis Orders   1. Type 2 diabetes mellitus without complication, without long-term current use of insulin ([facility])   controlled  POCT glycosylated hemoglobin (Hb A1C)   2. Essential hypertension  Comprehensive Metabolic Panel   3. Hyperlipidemia, unspecified hyperlipidemia type  Lipid Panel   4. Nonischemic dilated cardiomyopathy ([facility] [address]   stable under care of a cardiologist             Plan:         Orders Placed This Encounter   Procedures    Lipid Panel     Standing Status:   Future     Standing Expiration Date:   2/17/2021     Order Specific Question:   Is Patient Fasting?/# of Hours     Answer:   12    Comprehensive Metabolic Panel     Standing Status:   Future     Standing Expiration Date:   2/17/2021    POCT glycosylated hemoglobin (Hb A1C)     No orders of the defined types were placed in this encounter. Return in about 3 months (around 5/18/2020) for diabetes.     Continue current medications reviewed from the chart            Yaneth Gomez MA

## 2020-02-28 ENCOUNTER — HOSPITAL ENCOUNTER (OUTPATIENT)
Age: 60
Discharge: HOME | End: 2020-02-28
Payer: COMMERCIAL

## 2020-02-28 LAB
ALBUMIN SERPL-MCNC: 4 G/DL (ref 3.5–5.2)
ALBUMIN/GLOBULIN RATIO: ABNORMAL (ref 1–2.5)
ALP BLD-CCNC: 88 U/L (ref 35–104)
ALT SERPL-CCNC: 9 U/L (ref 5–33)
ANION GAP SERPL CALCULATED.3IONS-SCNC: 9 MMOL/L (ref 9–17)
AST SERPL-CCNC: 17 U/L
BILIRUB SERPL-MCNC: 0.7 MG/DL (ref 0.3–1.2)
BUN BLDV-MCNC: 16 MG/DL (ref 6–20)
BUN/CREAT BLD: ABNORMAL (ref 9–20)
CALCIUM SERPL-MCNC: 9.6 MG/DL (ref 8.6–10.4)
CHLORIDE BLD-SCNC: 106 MMOL/L (ref 98–107)
CHOLESTEROL/HDL RATIO: 2.9
CHOLESTEROL: 117 MG/DL
CO2: 25 MMOL/L (ref 20–31)
CREAT SERPL-MCNC: 0.57 MG/DL (ref 0.5–0.9)
GFR AFRICAN AMERICAN: >60 ML/MIN
GFR NON-AFRICAN AMERICAN: >60 ML/MIN
GFR SERPL CREATININE-BSD FRML MDRD: ABNORMAL ML/MIN/{1.73_M2}
GFR SERPL CREATININE-BSD FRML MDRD: ABNORMAL ML/MIN/{1.73_M2}
GLUCOSE BLD-MCNC: 133 MG/DL (ref 70–99)
HDLC SERPL-MCNC: 41 MG/DL
LDL CHOLESTEROL: 55 MG/DL (ref 0–130)
POTASSIUM SERPL-SCNC: 4.9 MMOL/L (ref 3.7–5.3)
SODIUM BLD-SCNC: 140 MMOL/L (ref 135–144)
TOTAL PROTEIN: 7.4 G/DL (ref 6.4–8.3)
TRIGL SERPL-MCNC: 106 MG/DL
VLDLC SERPL CALC-MCNC: NORMAL MG/DL (ref 1–30)

## 2020-02-28 PROCEDURE — 80061 LIPID PANEL: CPT

## 2020-02-28 PROCEDURE — 36415 COLL VENOUS BLD VENIPUNCTURE: CPT

## 2020-02-28 PROCEDURE — 80053 COMPREHEN METABOLIC PANEL: CPT

## 2020-05-20 ENCOUNTER — HOSPITAL ENCOUNTER (OUTPATIENT)
Dept: OTHER | Age: 60
Discharge: HOME | End: 2020-05-20
Payer: COMMERCIAL

## 2020-05-20 VITALS
BODY MASS INDEX: 37.99 KG/M2 | SYSTOLIC BLOOD PRESSURE: 110 MMHG | OXYGEN SATURATION: 98 % | HEIGHT: 60 IN | WEIGHT: 193.5 LBS | HEART RATE: 81 BPM | RESPIRATION RATE: 16 BRPM | DIASTOLIC BLOOD PRESSURE: 72 MMHG

## 2020-05-20 LAB
ANION GAP SERPL CALCULATED.3IONS-SCNC: 13 MMOL/L (ref 9–17)
BNP INTERPRETATION: NORMAL
BUN BLDV-MCNC: 17 MG/DL (ref 6–20)
CHLORIDE BLD-SCNC: 107 MMOL/L (ref 98–107)
CO2: 21 MMOL/L (ref 20–31)
CREAT SERPL-MCNC: 0.55 MG/DL (ref 0.5–0.9)
GFR AFRICAN AMERICAN: >60 ML/MIN
GFR NON-AFRICAN AMERICAN: >60 ML/MIN
GFR SERPL CREATININE-BSD FRML MDRD: NORMAL ML/MIN/{1.73_M2}
GFR SERPL CREATININE-BSD FRML MDRD: NORMAL ML/MIN/{1.73_M2}
POTASSIUM SERPL-SCNC: 4.4 MMOL/L (ref 3.7–5.3)
PRO-BNP: 30 PG/ML
SODIUM BLD-SCNC: 141 MMOL/L (ref 135–144)

## 2020-05-20 PROCEDURE — 84520 ASSAY OF UREA NITROGEN: CPT

## 2020-05-20 PROCEDURE — 80051 ELECTROLYTE PANEL: CPT

## 2020-05-20 PROCEDURE — 83880 ASSAY OF NATRIURETIC PEPTIDE: CPT

## 2020-05-20 PROCEDURE — 36415 COLL VENOUS BLD VENIPUNCTURE: CPT

## 2020-05-20 PROCEDURE — 99211 OFF/OP EST MAY X REQ PHY/QHP: CPT

## 2020-05-20 PROCEDURE — 82565 ASSAY OF CREATININE: CPT

## 2020-05-20 NOTE — PROGRESS NOTES
Weight is up slightly from last visit, but patient is feeling well. Still working at [employer]. Patient sees PCP in June. Verbally reviewed importance of medication compliance with patient; patient verbalized understanding. Discussed 2000mg/day sodium restricted diet; patient verbalized understanding. Moderate daily exercise encouraged as tolerated. Discussed rest breaks as needed; patient verbalized understanding. Patient instructed to weigh self at the same time of each day, using same clothes and same scale; reinforced teaching to monitor for 3-5 lb weight increase over 1-2 days, and to notify the CHF clinic at [phone] or physician office if weight change noted. Patient verbalized understanding. Risks of smoking discussed with the patient if applicable; patient strongly discouraged to smoke. Patient verbalized understanding. Signs and symptoms of CHF discussed with patient, such as feeling more tired than normal, feeling short of breath, coughing that increases when you lie down, sudden weight gain, swelling of your feet, legs or belly. Patient verbalized understanding to notify the CHF clinic at [phone] or physician office if these symptoms occur. Compliance with plan of care and further disease process causes discussed with patient, patient encouraged to keep all follow up appointments. Patient verbalized understanding.

## 2020-06-10 PROBLEM — R94.39 ABNORMAL STRESS TEST: Status: ACTIVE | Noted: 2019-08-15

## 2020-06-10 PROBLEM — R94.31 ELECTROCARDIOGRAM ABNORMAL: Status: ACTIVE | Noted: 2020-06-10

## 2020-06-10 NOTE — PROGRESS NOTES
[facility]  [facility]  [address]  [address] [address]  Dept: [phone]     2020     Chief Complaint   Patient presents with    New Patient     DANIA Randolph (:  1960) is a 62 y.o. female is an established patient of Dr. Roro Clarke Patient scheduled this appointment for ***. No data recorded   Counseling given: Not Answered    Review of Systems   Prior to Visit Medications    Medication Sig Taking? Authorizing Provider   carvedilol (COREG) 12.5 MG tablet Take 12.5 mg by mouth 2 times daily (with meals) Yes Historical Provider, MD   sacubitril-valsartan (ENTRESTO)  MG per tablet Take 1 tablet by mouth 2 times daily Yes Historical Provider, MD   Cholecalciferol (VITAMIN D3) 50 MCG ( UT) CAPS Take 2,000 Units by mouth Yes Historical Provider, MD   atorvastatin (LIPITOR) 20 MG tablet Take 20 mg by mouth Yes Historical Provider, MD   aspirin 81 MG tablet Take 81 mg by mouth Yes Historical Provider, MD   metFORMIN (GLUCOPHAGE) 500 MG tablet Take 500 mg by mouth 3 times daily Yes Historical Provider, MD      Social History     Tobacco Use    Smoking status: Never Smoker    Smokeless tobacco: Never Used   Substance Use Topics    Alcohol use: No     Alcohol/week: 0.0 standard drinks      There were no vitals filed for this visit. Estimated body mass index is 37.79 kg/m² as calculated from the following:    Height as of 20: 5' (1.524 m). Weight as of 20: 193 lb 8 oz (87.8 kg). Physical Exam  Most recent labs reviewed with patient, and all questions answered.   No results found for: WBC, HGB, HCT, MCV, PLT  Lab Results   Component Value Date     2020    K 4.4 2020     2020    CO2 21 2020    BUN 17 2020    CREATININE 0.55 2020    GLUCOSE 133 2020    CALCIUM 9.6 2020      Lab Results   Component Value Date    ALT 9 2020    AST 17 2020    ALKPHOS 88 2020

## 2020-06-11 ENCOUNTER — VIRTUAL VISIT (OUTPATIENT)
Dept: FAMILY MEDICINE CLINIC | Age: 60
End: 2020-06-11
Payer: COMMERCIAL

## 2020-06-11 ENCOUNTER — TELEPHONE (OUTPATIENT)
Dept: FAMILY MEDICINE CLINIC | Age: 60
End: 2020-06-11

## 2020-06-11 PROBLEM — E55.9 VITAMIN D DEFICIENCY: Status: ACTIVE | Noted: 2020-06-11

## 2020-06-11 PROCEDURE — 2022F DILAT RTA XM EVC RTNOPTHY: CPT | Performed by: FAMILY MEDICINE

## 2020-06-11 PROCEDURE — 3017F COLORECTAL CA SCREEN DOC REV: CPT | Performed by: FAMILY MEDICINE

## 2020-06-11 PROCEDURE — 99214 OFFICE O/P EST MOD 30 MIN: CPT | Performed by: FAMILY MEDICINE

## 2020-06-11 PROCEDURE — G8427 DOCREV CUR MEDS BY ELIG CLIN: HCPCS | Performed by: FAMILY MEDICINE

## 2020-06-11 PROCEDURE — 3044F HG A1C LEVEL LT 7.0%: CPT | Performed by: FAMILY MEDICINE

## 2020-06-11 NOTE — PROGRESS NOTES
sugar and sugar-sweetened beverages. Increasing physical exercises at least 3-4 times a week. We will monitor progression of condition. VITAMIN D  Patient's recent Vitamin D level is as follows:   Vit D, 25-Hydroxy   Date Value Ref Range Status   07/06/2019 16.4 (L) 30.0 - 100.0 ng/mL Final     Comment:        Reference Range:  Vitamin D status         Range   Deficiency              <20 ng/mL   Mild Deficiency       20-30 ng/mL   Sufficiency           ng/mL   Toxicity               >100 ng/mL       which is Insufficient. We discussed ways to manage this condition. We also discussed ways to improve the levels. Such as learning food groups that are high in Vitamin D. We also discuss that sun exposure is beneficial however, too much sun and sun damage can potentially result in skin cancer. I am going to start the patient on Vitamin D supplementation. I am also going to recheck VItamin D levels annually. Review of Systems    Patient Active Problem List    Diagnosis Date Noted    Vitamin D deficiency 06/11/2020    Electrocardiogram abnormal 06/10/2020    [facility] 02/18/2020    Hyperlipidemia 02/18/2020    Abnormal stress test 08/15/2019    Type 2 diabetes mellitus without complication, without long-term current use of insulin ([facility] 06/11/2019    Essential hypertension 06/11/2019        No Known Allergies     Prior to Visit Medications    Medication Sig Taking?  Authorizing Provider   carvedilol (COREG) 12.5 MG tablet Take 12.5 mg by mouth 2 times daily (with meals) Yes Historical Provider, MD   sacubitril-valsartan (ENTRESTO)  MG per tablet Take 1 tablet by mouth 2 times daily Yes Historical Provider, MD   Cholecalciferol (VITAMIN D3) 50 MCG (2000 UT) CAPS Take 2,000 Units by mouth Yes Historical Provider, MD   atorvastatin (LIPITOR) 20 MG tablet Take 20 mg by mouth Yes Historical Provider, MD   aspirin 81 MG tablet Take 81 mg by mouth Yes Historical Provider, MD metFORMIN (GLUCOPHAGE) 500 MG tablet Take 500 mg by mouth 3 times daily Yes Historical Provider, MD       Social History     Tobacco Use    Smoking status: Never Smoker    Smokeless tobacco: Never Used   Substance Use Topics    Alcohol use: No     Alcohol/week: 0.0 standard drinks    Drug use: No        PHYSICAL EXAMINATION:  Vital Signs: (As obtained by patient/caregiver or practitioner observation)    Constitutional: [x] Appears well-developed and well-nourished [x] No apparent distress      [x] Abnormal- obese  Mental status  [x] Alert and awake  [x] Oriented to person/place/time [x]Able to follow commands      Eyes:  EOM    [x]  Normal  [] Abnormal-  Sclera  [x]  Normal  [] Abnormal -         Discharge [x]  None visible  [] Abnormal -    HENT:   [x] Normocephalic, atraumatic.   [] Abnormal   [x] Mouth/Throat: Mucous membranes are moist.     External Ears [x] Normal  [] Abnormal-     Neck: [x] No visualized mass     Pulmonary/Chest: [x] Respiratory effort normal.  [x] No visualized signs of difficulty breathing or respiratory distress        [] Abnormal     Musculoskeletal:   [x] Normal gait with no signs of ataxia         [x] Normal range of motion of neck        [] Abnormal-     Neurological:        [x] No Facial Asymmetry (Cranial nerve 7 motor function) (limited exam to video visit)          [x] No gaze palsy        [] Abnormal-     Skin:        [x] No significant exanthematous lesions or discoloration noted on facial skin         [] Abnormal-     Psychiatric:       [x] Normal Affect [x] No Hallucinations        [x] Abnormal- Anxious    Other pertinent observable physical exam findings- Pressured Speech  No results found for: WBC, HGB, HCT, MCV, PLT  Lab Results   Component Value Date     05/20/2020    K 4.4 05/20/2020     05/20/2020    CO2 21 05/20/2020    BUN 17 05/20/2020    CREATININE 0.55 05/20/2020    GLUCOSE 133 02/28/2020    CALCIUM 9.6 02/28/2020      Lab Results   Component Value sun to prevent skin cancer. 6. Body mass index (BMI) of 37.0 to 37.9 in adult  COntinue to encourage to lose wt. No orders of the defined types were placed in this encounter. No orders of the defined types were placed in this encounter. Larry Cheek received counseling on the following healthy behaviors: nutrition, exercise and medication adherence  Reviewed prior labs and health maintenance. Continue current medications, diet and exercise. Discussed use, benefit, and side effects of prescribed medications. Barriers to medication compliance addressed. Patient given educational materials - see patient instructions. All patient questions answered. Patient voiced understanding. Return JULY, for DM/A1c, foot exam, pap smear? Rosi Drew is a 62 y.o. female being evaluated by a Virtual Visit (video visit) encounter to address concerns as mentioned above. . Due to this being a TeleHealth encounter (During TWCYW-59 public health emergency), evaluation of the following organ systems was limited:Vitals/Constitutional/EENT/Resp/CV/GI//MS/Neuro/Skin/Heme-Lymph-Imm. Services were provided through a video synchronous discussion virtually to substitute for in-person clinic visit. This is a telehealth visit that was performed with the originating site at Patient Location: home and provider Location of [place], [place]. Verbal consent to participate in video visit was obtained. Patient ID verified by me prior to start of this visit  I discussed with the patient the nature of our telehealth visits via interactive/real-time audio/video that:  - I would evaluate the patient and recommend diagnostics and treatments based on my assessment  - Our sessions are not being recorded and that personal health information is protected  - Our team would provide follow up care in person if/when the patient needs it.      Pursuant to the emergency declaration under the [address] [facility] [place], [address]

## 2020-06-11 NOTE — PROGRESS NOTES
by patient/caregiver or practitioner observation)    Constitutional: [x] Appears well-developed and well-nourished [x] No apparent distress      [] Abnormal-   Mental status  [x] Alert and awake  [x] Oriented to person/place/time [x]Able to follow commands      Psychiatric:       [x] Normal Affect [x] No Hallucinations        [x] Abnormal- Anxious    Other pertinent observable physical exam findings- ***    Due to this being a TeleHealth encounter, evaluation of the following organ systems is limited: Vitals/Constitutional/EENT/Resp/CV/GI//MS/Neuro/Skin/Heme-Lymph-Imm. ASSESSMENT/PLAN:  1. Essential hypertension  ***    2. Nonischemic dilated cardiomyopathy (HCC)  ***    3. Type 2 diabetes mellitus without complication, without long-term current use of insulin (HCC)  ***    4. Mixed hyperlipidemia  ***    No orders of the defined types were placed in this encounter. No orders of the defined types were placed in this encounter. Aubrey Casper received counseling on the following healthy behaviors: {Health Counselin}  Reviewed prior labs and health maintenance. Continue current medications, diet and exercise. Discussed use, benefit, and side effects of prescribed medications. Barriers to medication compliance addressed. Patient given educational materials - see patient instructions. All patient questions answered. Patient voiced understanding. No follow-ups on file. Consent:  She and/or health care decision maker is aware that that she may receive a bill for this telephone service, depending on her insurance coverage, and has provided verbal consent to proceed: Yes  I affirm this is a Patient Initiated Episode with a Patient who has not had a related appointment within my department in the past 7 days or scheduled within the next 24 hours.   Patient identification was verified at the start of the visit: Yes    Total Time: {minutes:07777::\"5-10 minutes\"}    Concepción Gaston    Collado Plant is a

## 2020-06-17 ENCOUNTER — TELEPHONE (OUTPATIENT)
Dept: FAMILY MEDICINE CLINIC | Age: 60
End: 2020-06-17

## 2020-06-17 NOTE — TELEPHONE ENCOUNTER
Needs appointment in about 4 weeks as a new to provider to address her multiple medical conditions including [employer] diabetes and health maintenance    Future Appointments   Date Time Provider Israel Hughes   8/4/2020  6:00 AM [facility] [address]

## 2020-08-06 ENCOUNTER — HOSPITAL ENCOUNTER (OUTPATIENT)
Dept: OTHER | Age: 60
Discharge: HOME | End: 2020-08-06
Payer: COMMERCIAL

## 2020-08-06 VITALS
RESPIRATION RATE: 16 BRPM | WEIGHT: 192.6 LBS | HEART RATE: 75 BPM | DIASTOLIC BLOOD PRESSURE: 68 MMHG | BODY MASS INDEX: 37.81 KG/M2 | SYSTOLIC BLOOD PRESSURE: 128 MMHG | OXYGEN SATURATION: 98 % | HEIGHT: 60 IN

## 2020-08-06 LAB
ANION GAP SERPL CALCULATED.3IONS-SCNC: 11 MMOL/L (ref 9–17)
BUN BLDV-MCNC: 19 MG/DL (ref 6–20)
CHLORIDE BLD-SCNC: 106 MMOL/L (ref 98–107)
CO2: 22 MMOL/L (ref 20–31)
CREAT SERPL-MCNC: 0.68 MG/DL (ref 0.5–0.9)
GFR AFRICAN AMERICAN: >60 ML/MIN
GFR NON-AFRICAN AMERICAN: >60 ML/MIN
GFR SERPL CREATININE-BSD FRML MDRD: NORMAL ML/MIN/{1.73_M2}
GFR SERPL CREATININE-BSD FRML MDRD: NORMAL ML/MIN/{1.73_M2}
POTASSIUM SERPL-SCNC: 4.6 MMOL/L (ref 3.7–5.3)
SODIUM BLD-SCNC: 139 MMOL/L (ref 135–144)

## 2020-08-06 PROCEDURE — 84520 ASSAY OF UREA NITROGEN: CPT

## 2020-08-06 PROCEDURE — 99211 OFF/OP EST MAY X REQ PHY/QHP: CPT

## 2020-08-06 PROCEDURE — 82565 ASSAY OF CREATININE: CPT

## 2020-08-06 PROCEDURE — 80051 ELECTROLYTE PANEL: CPT

## 2020-08-06 PROCEDURE — 36415 COLL VENOUS BLD VENIPUNCTURE: CPT

## 2020-08-06 NOTE — PROGRESS NOTES
smear. She denies any abnormal pap smear. She also denies breast cancer. Mammogram up to date. VITAMIN D  Patient has a known Vitamin D Deficiency. she had a course of supplementation for 12 weeks. she is due to get levels check. We continue to discuss ways to manage this condition. We also discussed ways to improve the levels. Such as learning food groups that are high in Vitamin D. We also discuss that sun exposure is beneficial however, too much sun and sun damage can potentially result in skin cancer. Vit D, 25-Hydroxy   Date Value Ref Range Status   2019 16.4 (L) 30.0 - 100.0 ng/mL Final     Comment:        Reference Range:  Vitamin D status         Range   Deficiency              <20 ng/mL   Mild Deficiency       20-30 ng/mL   Sufficiency           ng/mL   Toxicity               >100 ng/mL        Corwin Talamantes is due for Varicella vaccine. her  indication is age over 48. Benefits of getting immunization against shingles discussed, and patient is agreeable. she  denies side effects to prior immunizations. Corwin Talamantes is due for pneumonia vaccine. she   indication is 63 y/o  she  denies side effects from prior immunizations.   No data recorded   Counseling given: Not Answered    Patient Active Problem List   Diagnosis    Type 2 diabetes mellitus without complication, without long-term current use of insulin ([address]    Essential hypertension    Nonischemic dilated cardiomyopathy (HCC)    Hyperlipidemia    Abnormal stress test    Electrocardiogram abnormal    Vitamin D deficiency    Nail fungal infection      Past Medical History:   Diagnosis Date    Diabetes mellitus ([address]     Hypertension       Past Surgical History:   Procedure Laterality Date     SECTION      two    NE COLON CA SCRN NOT HI RSK IND N/A 10/12/2018    COLONOSCOPY performed by Stefani Kimbrough MD at [address] [place] [address] OR     Family History   Problem Relation Age of Onset    High Blood Pressure Mother    Helen Olivares Other Mother glaucoma    Diabetes Mother     Diabetes Sister     High Blood Pressure Sister     High Cholesterol Sister     Cancer Brother      Current Outpatient Medications   Medication Sig Dispense Refill    zoster recombinant adjuvanted vaccine (SHINGRIX) 50 MCG/0.5ML SUSR injection Inject 0.5 mLs into the muscle See Admin Instructions 1 dose now and repeat in 2-6 months 0.5 mL 0    ciclopirox (PENLAC) 8 % solution Apply topically nightly. 1 Bottle 3    carvedilol (COREG) 12.5 MG tablet Take 12.5 mg by mouth 2 times daily (with meals)      sacubitril-valsartan (ENTRESTO)  MG per tablet Take 1 tablet by mouth 2 times daily      Cholecalciferol (VITAMIN D3) 50 MCG (2000 UT) CAPS Take 2,000 Units by mouth      atorvastatin (LIPITOR) 20 MG tablet Take 20 mg by mouth      aspirin 81 MG tablet Take 81 mg by mouth      metFORMIN (GLUCOPHAGE) 500 MG tablet Take 500 mg by mouth 3 times daily       No current facility-administered medications for this visit. Review of Systems   Constitutional: Negative for chills, fatigue and fever. HENT: Negative. Respiratory: Negative. Negative for cough and shortness of breath. Cardiovascular: Negative. Negative for chest pain and palpitations. Gastrointestinal: Negative for abdominal pain. Genitourinary: Negative for dysuria. Musculoskeletal: Negative for arthralgias and myalgias. Skin: Negative for rash. Neurological: Negative for light-headedness and headaches. Psychiatric/Behavioral: Negative for sleep disturbance. The patient is nervous/anxious. Prior to Visit Medications    Medication Sig Taking? Authorizing Provider   zoster recombinant adjuvanted vaccine [facility]) 50 MCG/0.5ML SUSR injection Inject 0.5 mLs into the muscle See Admin Instructions 1 dose now and repeat in 2-6 months Yes MILLY Swann CNP   ciclopirox (PENLAC) 8 % solution Apply topically nightly.  Yes MILLY Swann CNP   carvedilol (COREG) 12.5 MG tablet Take 12.5 mg by mouth 2 times daily (with meals) Yes Historical Provider, MD   sacubitril-valsartan (ENTRESTO)  MG per tablet Take 1 tablet by mouth 2 times daily Yes Historical Provider, MD   Cholecalciferol (VITAMIN D3) 50 MCG (2000 UT) CAPS Take 2,000 Units by mouth Yes Historical Provider, MD   atorvastatin (LIPITOR) 20 MG tablet Take 20 mg by mouth Yes Historical Provider, MD   aspirin 81 MG tablet Take 81 mg by mouth Yes Historical Provider, MD   metFORMIN (GLUCOPHAGE) 500 MG tablet Take 500 mg by mouth 3 times daily Yes Historical Provider, MD      Social History     Tobacco Use    Smoking status: Never Smoker    Smokeless tobacco: Never Used   Substance Use Topics    Alcohol use: No     Alcohol/week: 0.0 standard drinks      Vitals:    08/07/20 0906   BP: 124/78   Site: Left Upper Arm   Position: Sitting   Cuff Size: Medium Adult   Pulse: 71   Temp: 97.5 °F (36.4 °C)   TempSrc: Temporal   SpO2: 97%   Weight: 194 lb 12.8 oz (88.4 kg)     Estimated body mass index is 38.04 kg/m² as calculated from the following:    Height as of 8/6/20: 5' (1.524 m). Weight as of this encounter: 194 lb 12.8 oz (88.4 kg). Physical Exam  Vitals signs and nursing note reviewed. Exam conducted with a chaperone present. Constitutional:       Appearance: She is well-developed. HENT:      Head: Normocephalic. Right Ear: Tympanic membrane and external ear normal.      Left Ear: Tympanic membrane and external ear normal.      Nose: Nose normal.      Mouth/Throat:      Mouth: Mucous membranes are moist.   Eyes:      Pupils: Pupils are equal, round, and reactive to light. Neck:      Musculoskeletal: Normal range of motion and neck supple. Thyroid: No thyromegaly. Vascular: No JVD. Cardiovascular:      Rate and Rhythm: Normal rate and regular rhythm. Pulses:           Dorsalis pedis pulses are 3+ on the right side and 3+ on the left side.         Posterior tibial pulses are 2+ on the right side and 2+ on the left side. Heart sounds: Normal heart sounds. No murmur. Pulmonary:      Effort: Pulmonary effort is normal. No respiratory distress. Breath sounds: Normal breath sounds. Chest:      Chest wall: No mass, deformity, swelling or tenderness. Breasts:         Right: Normal. No mass, nipple discharge or skin change. Left: Normal. No mass, nipple discharge or skin change. Abdominal:      General: Bowel sounds are normal. There is no distension. Palpations: Abdomen is soft. Tenderness: There is no abdominal tenderness. Genitourinary:     General: Normal vulva. Exam position: Lithotomy position. Pubic Area: No rash or pubic lice. Labia:         Right: No tenderness. Left: No tenderness. Urethra: No prolapse. Comments: Pelvic Exam: cervix normal in appearance, external genitalia normal, vagina normal without discharge. Pap smear obtained. Musculoskeletal: Normal range of motion. Right foot: No deformity. Left foot: No deformity. Feet:      Right foot:      Protective Sensation: 10 sites tested. 10 sites sensed. Skin integrity: Callus and dry skin present. No skin breakdown or erythema. Toenail Condition: Right toenails are abnormally thick. Fungal disease present. Left foot:      Protective Sensation: 10 sites tested. 10 sites sensed. Skin integrity: Callus and dry skin present. No skin breakdown or erythema. Toenail Condition: Left toenails are abnormally thick. Fungal disease present. Lymphadenopathy:      Cervical: No cervical adenopathy. Skin:     General: Skin is warm and dry. Capillary Refill: Capillary refill takes less than 2 seconds. Neurological:      Mental Status: She is alert and oriented to person, place, and time. Psychiatric:         Mood and Affect: Mood is anxious. Speech: Speech is rapid and pressured.          Behavior: Behavior normal.       Most recent labs reviewed with patient, and all questions answered. No results found for: WBC, HGB, HCT, MCV, PLT  Lab Results   Component Value Date     08/06/2020    K 4.6 08/06/2020     08/06/2020    CO2 22 08/06/2020    BUN 19 08/06/2020    CREATININE 0.68 08/06/2020    GLUCOSE 133 02/28/2020    CALCIUM 9.6 02/28/2020      Lab Results   Component Value Date    ALT 9 02/28/2020    AST 17 02/28/2020    ALKPHOS 88 02/28/2020    BILITOT 0.70 02/28/2020     No results found for: TSHFT4, TSH  Lab Results   Component Value Date    CHOL 117 02/28/2020    CHOL 168 07/06/2019    CHOL 183 08/18/2018     Lab Results   Component Value Date    TRIG 106 02/28/2020    TRIG 123 07/06/2019    TRIG 123 08/18/2018     Lab Results   Component Value Date    HDL 41 02/28/2020    HDL 38 (L) 07/06/2019    HDL 38 (L) 08/18/2018     Lab Results   Component Value Date    LDLCHOLESTEROL 55 02/28/2020    LDLCHOLESTEROL 105 07/06/2019    LDLCHOLESTEROL 120 08/18/2018     Lab Results   Component Value Date    CHOLHDLRATIO 2.9 02/28/2020    CHOLHDLRATIO 4.4 07/06/2019    CHOLHDLRATIO 4.8 08/18/2018     Lab Results   Component Value Date    LABA1C 6.7 (A) 08/07/2020        Lab Results   Component Value Date    VITD25 16.4 (L) 07/06/2019     ASSESSMENT/PLAN:   1. Nonischemic dilated cardiomyopathy (HCC)  Stable  Continue cardiac consult- Dr. Julieta Toribio current therapy  Advised to report for worsening symptoms      2. Essential hypertension  Stable  Continue current therapy. DISCUSSED AND ADVISED TO:  Cut down on your salt intake. Cut down on caffeinated drinks, sports drinks. Instructed to check BP at home regularly. Report for any chest pains, shortness of breath, headaches, and lightheadedness.   Call the office if your blood pressure continue to be higher than 140/90 or 90/50.      3. Mixed hyperlipidemia  Stable  Continue therapy   Advised to decrease the consumption of red meats, fried foods, trans fats, sweets, sugary beverages. Advised to increase fish, vegetables, and fruits consumption. Advised to add fiber or OTC supplements in diet. Discussed weight loss which will result in improvement of lipids levels. Advised to increase daily physical activities and add regular exercises. 4. Type 2 diabetes mellitus without complication, without long-term current use of insulin (HCC)  Stable  Continue therapy. Metformin  We will recheck Hemoglobin A1c on 3 mos  DISCUSSED and ADVISED TO:  Continue to check Glucose levels at home. Report increased and low levels as discussed. Decrease carbohydrates, sugary drinks, desserts in your diet. Exercise regularly, as tolerated. Try to lose weight. - Microalbumin, Ur; Future  - POCT glycosylated hemoglobin (Hb A1C)    5. Vitamin D deficiency  Stable  Start Vitamin D supplementation  DISCUSSED AND ADVISED TO:  Foods that contain a lot of vitamin D includes [place], tuna, and mackerel. Cheese, egg yolks, and beef liver have small amounts of vit D.  Milk, soy drinks, orange juice, yogurt, margarine, and some kinds of cereal have vitamin D added to them. Continue to use sunblock when out in the sun to prevent skin cancer.  - Vitamin D 25 Hydroxy; Future    6. Onychomycosis of multiple toenails with type 2 diabetes mellitus ([facility]  Failure to Improve  Start new therapy  - ciclopirox (PENLAC) 8 % solution; Apply topically nightly. Dispense: 1 Bottle; Refill: 3    7. Encounter for gynecological examination  Normal exam  Will call for pap report  - [place] OBTAINING SCREEN PAP SMEAR; Future  - PAP SMEAR; Future    8. Routine Papanicolaou smear  WIll call for results  - [place] OBTAINING SCREEN PAP SMEAR; Future  - PAP SMEAR; Future    9. Encounter for diabetic foot exam ([facility]  Normal foot   onychomycosis    10.  Class 2 severe obesity due to excess calories with serious comorbidity and body mass index (BMI) of 37.0 to 37.9 in adult ([facility]  BMI increasing  DISCUSSED AND ADVISED TO:  Eat a low-fat and low carbohydrates diet. Avoid fried foods especially fast food. Choose healthier options for snacks. Have 5-6 servings of fruits and vegetables per day. Cut down on eating processed food. Add 30 minutes to 1 hour aerobic exercise for 3-4 days a week. - TSH without Reflex; Future    11. Need for pneumococcal vaccination  Recommended by CDC. No current infection. Denies previous adverse reaction to vaccination.    - Pneumococcal polysaccharide vaccine 23-valent greater than or equal to 3yo subcutaneous/IM    12. Need for varicella vaccine  Recommended by CDC. No current infection. Denies previous adverse reaction to vaccination. - zoster recombinant adjuvanted vaccine [facility] 50 MCG/0.5ML SUSR injection; Inject 0.5 mLs into the muscle See Admin Instructions 1 dose now and repeat in 2-6 months  Dispense: 0.5 mL; Refill: 0    13. Screening for viral disease  recommended    - Hepatitis C Antibody; Future  - HIV Screen; Future    14. Screening for endocrine disorder  recommended    - TSH without Reflex; Future    Controlled Substance Monitoring:  Acute and Chronic Pain Monitoring:   No flowsheet data found. Orders Placed This Encounter   Procedures    Hepatitis C Antibody     Standing Status:   Future     Standing Expiration Date:   2022    HIV Screen     Standing Status:   Future     Standing Expiration Date:   2022    Vitamin D 25 Hydroxy     Standing Status:   Future     Standing Expiration Date:   2021    TSH without Reflex     Standing Status:   Future     Standing Expiration Date:   2021    Microalbumin, Ur     Standing Status:   Future     Standing Expiration Date:   2021    PAP SMEAR     Patient History:    No LMP recorded.  Patient is postmenopausal.  OBGYN Status: Postmenopausal  Past Surgical History:  No date:  SECTION      Comment:  two  10/12/2018: [place] COLON CA SCRN NOT HI RSK IND; N/A      Comment:  COLONOSCOPY performed by Toya Coon MD at [address] [place] [address] OR      Social History    Tobacco Use      Smoking status: Never Smoker      Smokeless tobacco: Never Used       Standing Status:   Future     Standing Expiration Date:   8/6/2021     Order Specific Question:   Collection Type     Answer:   SurePath     Order Specific Question:   Prior Abnormal Pap Test     Answer:   No     Order Specific Question:   Screening or Diagnostic     Answer:   Screening     Order Specific Question:   HPV Requested? Answer:   Yes     Order Specific Question:   High Risk Patient     Answer:   N/A    NC OBTAINING SCREEN PAP SMEAR     Standing Status:   Future     Standing Expiration Date:   2/6/2022     Orders Placed This Encounter   Medications    zoster recombinant adjuvanted vaccine (SHINGRIX) 50 MCG/0.5ML SUSR injection     Sig: Inject 0.5 mLs into the muscle See Admin Instructions 1 dose now and repeat in 2-6 months     Dispense:  0.5 mL     Refill:  0    ciclopirox (PENLAC) 8 % solution     Sig: Apply topically nightly. Dispense:  1 Bottle     Refill:  3      There are no discontinued medications. Health Maintenance Due   Topic Date Due    Hepatitis C screen  1960    HIV screen  06/03/1976    Hepatitis B vaccine (1 of 3 - Risk 3-dose series) 06/03/1980    Cervical cancer screen  06/03/1982    Diabetic retinal exam  02/22/2020    Diabetic foot exam  06/11/2020    Diabetic microalbuminuria test  06/11/2020      Return in about 4 months (around 12/7/2020) for Chronic conditions. Guillermo Mccracken received counseling on the following healthy behaviors: nutrition, exercise and medication adherence  Reviewed prior labs and health maintenance  Continue current medications, diet and exercise. Discussed use, benefit, and side effects of prescribed medications. Barriers to medication compliance addressed. Patient given educational materials - see patient instructions  Was a self-tracking handout given in paper form or via [employer]?  Yes    Requested Prescriptions Signed Prescriptions Disp Refills    zoster recombinant adjuvanted vaccine (SHINGRIX) 50 MCG/0.5ML SUSR injection 0.5 mL 0     Sig: Inject 0.5 mLs into the muscle See Admin Instructions 1 dose now and repeat in 2-6 months    ciclopirox (PENLAC) 8 % solution 1 Bottle 3     Sig: Apply topically nightly. All patient questions answered. Patient voiced understanding. Quality Measures    Body mass index is 38.04 kg/m². Elevated. Weight control planned discussed Healthy diet and regular exercise. BP: 124/78 Blood pressure is normal. Treatment plan consists of No treatment change needed. Lab Results   Component Value Date    LDLCHOLESTEROL 55 02/28/2020    (goal LDL reduction with dx if diabetes is 50% LDL reduction)      PHQ Scores 2/18/2020 6/11/2019 11/9/2015   PHQ2 Score 0 0 0   PHQ9 Score 0 0 0     Interpretation of Total Score Depression Severity: 1-4 = Minimal depression, 5-9 = Mild depression, 10-14 = Moderate depression, 15-19 = Moderately severe depression, 20-27 = Severe depression   This note was completed by using the assistance of a speech-recognition program. However, inadvertent computerized transcription errors may be present. Although every effort was made to ensure accuracy, no guarantees can be provided that every mistake has been identified and corrected by editing   An electronic signature was used to authenticate this note.   --MILLY Hilton - CNP on 8/7/2020 at 7:39 PM

## 2020-08-06 NOTE — PROGRESS NOTES
Weight steady from previous visit. Patient doing very well and feels great. Patient working regularly and tolerates it very well. Patient on highest dose of Entresto and tolerates this very well. Will call and see when patient needs a cardiology appointment and a repeat echo. Patient sees new PCP tomorrow. Verbally reviewed importance of medication compliance with patient; patient verbalized understanding. Discussed 2000mg/day sodium restricted diet; patient verbalized understanding. Moderate daily exercise encouraged as tolerated. Discussed rest breaks as needed; patient verbalized understanding. Patient instructed to weigh self at the same time of each day, using same clothes and same scale; reinforced teaching to monitor for 3-5 lb weight increase over 1-2 days, and to notify the CHF clinic at [phone] or physician office if weight change noted. Patient verbalized understanding. Risks of smoking discussed with the patient if applicable; patient strongly discouraged to smoke. Patient verbalized understanding. Signs and symptoms of CHF discussed with patient, such as feeling more tired than normal, feeling short of breath, coughing that increases when you lie down, sudden weight gain, swelling of your feet, legs or belly. Patient verbalized understanding to notify the CHF clinic at [phone] or physician office if these symptoms occur. Compliance with plan of care and further disease process causes discussed with patient, patient encouraged to keep all follow up appointments. Patient verbalized understanding.

## 2020-08-07 ENCOUNTER — HOSPITAL ENCOUNTER (OUTPATIENT)
Age: 60
Setting detail: SPECIMEN
Discharge: HOME | End: 2020-08-07
Payer: COMMERCIAL

## 2020-08-07 ENCOUNTER — OFFICE VISIT (OUTPATIENT)
Dept: FAMILY MEDICINE CLINIC | Age: 60
End: 2020-08-07
Payer: COMMERCIAL

## 2020-08-07 VITALS
WEIGHT: 194.8 LBS | TEMPERATURE: 97.5 F | HEART RATE: 71 BPM | SYSTOLIC BLOOD PRESSURE: 124 MMHG | BODY MASS INDEX: 38.04 KG/M2 | OXYGEN SATURATION: 97 % | DIASTOLIC BLOOD PRESSURE: 78 MMHG

## 2020-08-07 PROBLEM — B35.1 NAIL FUNGAL INFECTION: Status: ACTIVE | Noted: 2020-08-07

## 2020-08-07 LAB — HBA1C MFR BLD: 6.7 %

## 2020-08-07 PROCEDURE — G8427 DOCREV CUR MEDS BY ELIG CLIN: HCPCS | Performed by: FAMILY MEDICINE

## 2020-08-07 PROCEDURE — 83036 HEMOGLOBIN GLYCOSYLATED A1C: CPT | Performed by: FAMILY MEDICINE

## 2020-08-07 PROCEDURE — 1036F TOBACCO NON-USER: CPT | Performed by: FAMILY MEDICINE

## 2020-08-07 PROCEDURE — 90732 PPSV23 VACC 2 YRS+ SUBQ/IM: CPT | Performed by: FAMILY MEDICINE

## 2020-08-07 PROCEDURE — 90471 IMMUNIZATION ADMIN: CPT | Performed by: FAMILY MEDICINE

## 2020-08-07 PROCEDURE — 3044F HG A1C LEVEL LT 7.0%: CPT | Performed by: FAMILY MEDICINE

## 2020-08-07 PROCEDURE — 2022F DILAT RTA XM EVC RTNOPTHY: CPT | Performed by: FAMILY MEDICINE

## 2020-08-07 PROCEDURE — 99214 OFFICE O/P EST MOD 30 MIN: CPT | Performed by: FAMILY MEDICINE

## 2020-08-07 PROCEDURE — G8417 CALC BMI ABV UP PARAM F/U: HCPCS | Performed by: FAMILY MEDICINE

## 2020-08-07 PROCEDURE — 3017F COLORECTAL CA SCREEN DOC REV: CPT | Performed by: FAMILY MEDICINE

## 2020-08-07 RX ORDER — ZOSTER VACCINE RECOMBINANT, ADJUVANTED 50 MCG/0.5
0.5 KIT INTRAMUSCULAR SEE ADMIN INSTRUCTIONS
Qty: 0.5 ML | Refills: 0 | Status: SHIPPED | OUTPATIENT
Start: 2020-08-07 | End: 2021-02-03

## 2020-08-07 ASSESSMENT — ENCOUNTER SYMPTOMS
SHORTNESS OF BREATH: 0
RESPIRATORY NEGATIVE: 1
COUGH: 0
ABDOMINAL PAIN: 0

## 2020-08-07 NOTE — PATIENT INSTRUCTIONS
Patient Education        pneumococcal 13-valent conjugate vaccine  Pronunciation:  KITTY lorraine JAGDEEP al 13-VAY lent EVI del rio VAX een  Brand:  Prevnar 15  What is the most important information I should know about this vaccine? For children, the pneumococcal 13-valent vaccine is given in a series of shots. The first shot is usually given when the child is 3 months old. The booster shots are then given at 4 months, 6 months, and 15to 13months of age. Adults usually receive only one dose of the vaccine. In a child older than 6 months who has not yet received this vaccine, the first dose can be given any time from the age of 10 months through 11 years (before the 7th birthday). If the child is less than 3year old at the time of the first shot, he or she will need 2 booster doses. If the child is 15 to 22 months old at the time of the first shot, he or she will need 1 booster dose. A child who is 2 years or older at the time of the first shot may need only the one shot and no booster doses. The timing of this vaccination is very important for it to be effective. Your child's individual booster schedule may be different from these guidelines. Follow your doctor's instructions or the schedule recommended by the health department of the state you live in. Keep track of any and all side effects your child has after receiving this vaccine. When the child receives a booster dose, you will need to tell the doctor if the previous shot caused any side effects. You can still receive a vaccine if you have a minor cold. In the case of a more severe illness with a fever or any type of infection, wait until you get better before receiving this vaccine. Becoming infected with pneumococcal disease (such as pneumonia or meningitis) is much more dangerous to your health than receiving this vaccine. However, like any medicine, this vaccine can cause side effects but the risk of serious side effects is extremely low.   Be sure to keep your child on a regular schedule for other immunizations against diseases such as diphtheria, tetanus, pertussis (whooping cough), measles, mumps, hepatitis, or varicella (chicken pox). Your doctor or state health department can provide you with a recommended immunization schedule. What is pneumococcal 13-valent conjugate vaccine? Pneumococcal disease is a serious infection caused by a bacteria. Pneumococcal bacteria can infect the sinuses and inner ear. It can also infect the lungs, blood, and brain, and these conditions can be fatal.  Pneumococcal 13-valent vaccine is used to prevent infection caused by pneumococcal bacteria. This vaccine contains 13 different types of pneumococcal bacteria. Pneumococcal 13-valent vaccine works by exposing you to a small amount of the bacteria or a protein from the bacteria, which causes the body to develop immunity to the disease. This vaccine will not treat an active infection that has already developed in the body. Pneumococcal 13-valent vaccine is for use in children from 6 weeks to 11years old, and in adults who are 48 and older. Becoming infected with pneumococcal disease (such as pneumonia or meningitis) is much more dangerous to your health than receiving this vaccine. However, like any medicine, this vaccine can cause side effects but the risk of serious side effects is extremely low. Like any vaccine, pneumococcal 13-valent vaccine may not provide protection from disease in every person. What should I discuss with my healthcare provider before receiving this vaccine? Keep track of any and all side effects your child has after receiving this vaccine. When the child receives a booster dose, you will need to tell the doctor if the previous shot caused any side effects. You should not receive this vaccine if you ever had a severe allergic reaction to a pneumococcal or diphtheria vaccine.   Before your child receives this vaccine, tell your doctor if the child was is given and for the next 24 hours. Follow the label directions or your doctor's instructions about how much of this medicine to give your child. It is especially important to prevent fever from occurring in a child who has a seizure disorder such as epilepsy. Be sure to keep your child on a regular schedule for other immunizations such as diphtheria, tetanus, pertussis (whooping cough), hepatitis, and varicella (chicken pox). Your doctor or state health department can provide you with a recommended immunization schedule. What happens if I miss a dose? Contact your doctor if your child will miss a booster dose or gets behind schedule. The next dose should be given as soon as possible. There is no need to start over. Be sure your child receives all recommended doses of this vaccine. If your child does not receive the full series of vaccines, he or she may not be fully protected against the disease. What happens if I overdose? An overdose of this vaccine is unlikely to occur. What should I avoid before or after receiving this vaccine? Follow your doctor's instructions about any restrictions on food, beverages, or activity. What are the possible side effects of this vaccine? Your child should not receive a booster vaccine if he or she had a life-threatening allergic reaction after the first shot. Keep track of any and all side effects your child has after receiving this vaccine. When the child receives a booster dose, you will need to tell the doctor if the previous shot caused any side effects. Get emergency medical help if your child has any of these signs of an allergic reaction: hives; difficulty breathing; swelling of the face, lips, tongue, or throat.   Call your doctor at once if you or your child has a serious side effect such as:  · high fever (103 degrees or higher);  · seizure (convulsions);  · wheezing, trouble breathing;  · severe stomach pain, severe vomiting or diarrhea;  · easy bruising or bleeding; or  · severe pain, itching, irritation, or skin changes where the shot was given. Less serious side effects include  · crying, fussiness;  · headache, tired feeling;  · muscle or joint pain;  · drowsiness, sleeping more or less than usual;  · mild redness, swelling, tenderness, or a hard lump where the shot was given;  · loss of appetite, mild vomiting or diarrhea;  · low fever (102 degrees or less), chills; or  · mild skin rash. This is not a complete list of side effects and others may occur. Call your doctor for medical advice about side effects. You may report vaccine side effects to the [address] [place] [address] [id number] and [facility] at [phone]. What other drugs will affect this vaccine? Before receiving this vaccine, tell the doctor about all other vaccines you or your child have recently received. Also tell the doctor if you or your child have recently received drugs or treatments that can weaken the immune system, including:  · an oral, nasal, inhaled, or injectable steroid medicine;  · chemotherapy or radiation;  · medications to treat psoriasis, rheumatoid arthritis, or other autoimmune disorders, such as azathioprine (Imuran), etanercept (Enbrel), leflunomide ([address]), and others; or  · medicines to treat or prevent organ transplant rejection, such as basiliximab (Simulect), cyclosporine (Sandimmune, Neoral, Gengraf), muromonab CD3 (Orthoclone), mycophenolate mofetil (CellCept), sirolimus (Rapamune), or tacrolimus (Prograf). If you are using any of these medications, you may not be able to receive the vaccine, or may need to wait until the other treatments are finished. There may be other drugs that can interact with pneumococcal 13-valent vaccine. Tell your doctor about all medications you use. This includes prescription, over-the-counter, vitamin, and herbal products. Do not start a new medication without telling your doctor. Where can I get more information?   Your doctor or pharmacist can provide more information about this vaccine. Additional information is available from your local health department or the Centers for Disease Control and Prevention. Remember, keep this and all other medicines out of the reach of children, never share your medicines with others, and use this medication only for the indication prescribed. Every effort has been made to ensure that the information provided by [address] is accurate, up-to-date, and complete, but no guarantee is made to that effect. Drug information contained herein may be time sensitive. [facility] information has been compiled for use by healthcare practitioners and consumers in the [place] and therefore [facility] does not warrant that uses outside of the [place] are appropriate, unless specifically indicated otherwise. [facility]'s drug information does not endorse drugs, diagnose patients or recommend therapy. [facility]'s drug information is an informational resource designed to assist licensed healthcare practitioners in caring for their patients and/or to serve consumers viewing this service as a supplement to, and not a substitute for, the expertise, skill, knowledge and judgment of healthcare practitioners. The absence of a warning for a given drug or drug combination in no way should be construed to indicate that the drug or drug combination is safe, effective or appropriate for any given patient. [facility] does not assume any responsibility for any aspect of healthcare administered with the aid of information [facility] provides. The information contained herein is not intended to cover all possible uses, directions, precautions, warnings, drug interactions, allergic reactions, or adverse effects. If you have questions about the drugs you are taking, check with your doctor, nurse or pharmacist.  Copyright 3209-3013 [address] [place] [address] Dr VELAZQUEZ. Version: 4.01. Revision date: 1/16/2012.   Care instructions adapted under license by [facility] ([facility]). If you have questions about a medical condition or this instruction, always ask your healthcare professional. [place] [address] any warranty or liability for your use of this information.

## 2020-08-07 NOTE — PROGRESS NOTES
Visit Information    Have you changed or started any medications since your last visit including any over-the-counter medicines, vitamins, or herbal medicines? no   Are you having any side effects from any of your medications? -  no  Have you stopped taking any of your medications? Is so, why? -  no    Have you seen any other physician or provider since your last visit? Yes - Records Obtained  Have you had any other diagnostic tests since your last visit? Yes - Records Obtained  Have you been seen in the emergency room and/or had an admission to a hospital since we last saw you? No  Have you had your routine dental cleaning in the past 6 months? no    Have you activated your [employer] account? If not, what are your barriers?  No:      Patient Care Team:  Justus Main MD as PCP - General (Family Medicine)  Layla Almaguer MD as Consulting Physician (Cardiology)    Medical History Review  Past Medical, Family, and Social History reviewed and does not contribute to the patient presenting condition    Health Maintenance   Topic Date Due    Hepatitis C screen  1960    Pneumococcal 0-64 years Vaccine (1 of 1 - PPSV23) 06/03/1967    HIV screen  06/03/1976    Hepatitis B vaccine (1 of 3 - Risk 3-dose series) 06/03/1980    Cervical cancer screen  06/03/1982    Diabetic retinal exam  02/22/2020    Diabetic foot exam  06/11/2020    Diabetic microalbuminuria test  06/11/2020    DTaP/Tdap/Td vaccine (1 - Tdap) 02/18/2021 (Originally 6/3/1980)    Shingles Vaccine (1 of 2) 02/18/2021 (Originally 6/3/2011)    Flu vaccine (1) 09/01/2020    A1C test (Diabetic or Prediabetic)  02/18/2021    Lipid screen  02/28/2021    Potassium monitoring  08/06/2021    Creatinine monitoring  08/06/2021    Breast cancer screen  09/23/2021    Colon cancer screen colonoscopy  10/12/2028    Hepatitis A vaccine  Aged Out    Hib vaccine  Aged Out    Meningococcal (ACWY) vaccine  Aged Out

## 2020-08-11 LAB
HPV SAMPLE: NORMAL
HPV, GENOTYPE 16: NOT DETECTED
HPV, GENOTYPE 18: NOT DETECTED
HPV, HIGH RISK OTHER: NOT DETECTED
HPV, INTERPRETATION: NORMAL
SPECIMEN DESCRIPTION: NORMAL

## 2020-08-13 LAB — CYTOLOGY REPORT: NORMAL

## 2020-09-01 ENCOUNTER — HOSPITAL ENCOUNTER (OUTPATIENT)
Age: 60
Discharge: HOME | End: 2020-09-01
Payer: COMMERCIAL

## 2020-09-02 ENCOUNTER — HOSPITAL ENCOUNTER (OUTPATIENT)
Age: 60
Discharge: HOME | End: 2020-09-02
Payer: COMMERCIAL

## 2020-09-02 LAB
CREATININE URINE: 211.6 MG/DL (ref 28–217)
HEPATITIS C ANTIBODY: NONREACTIVE
HIV AG/AB: NONREACTIVE
MICROALBUMIN/CREAT 24H UR: 14 MG/L
MICROALBUMIN/CREAT UR-RTO: 7 MCG/MG CREAT
TSH SERPL DL<=0.05 MIU/L-ACNC: 1.49 MIU/L (ref 0.3–5)
VITAMIN D 25-HYDROXY: 36.2 NG/ML (ref 30–100)

## 2020-09-02 PROCEDURE — 82043 UR ALBUMIN QUANTITATIVE: CPT

## 2020-09-02 PROCEDURE — 82306 VITAMIN D 25 HYDROXY: CPT

## 2020-09-02 PROCEDURE — 84443 ASSAY THYROID STIM HORMONE: CPT

## 2020-09-02 PROCEDURE — 82570 ASSAY OF URINE CREATININE: CPT

## 2020-09-02 PROCEDURE — 86803 HEPATITIS C AB TEST: CPT

## 2020-09-02 PROCEDURE — 87389 HIV-1 AG W/HIV-1&-2 AB AG IA: CPT

## 2020-09-02 PROCEDURE — 36415 COLL VENOUS BLD VENIPUNCTURE: CPT

## 2021-01-07 ENCOUNTER — HOSPITAL ENCOUNTER (OUTPATIENT)
Dept: OTHER | Age: 61
Discharge: HOME | End: 2021-01-07
Payer: COMMERCIAL

## 2021-01-07 VITALS
HEART RATE: 74 BPM | BODY MASS INDEX: 38.31 KG/M2 | WEIGHT: 195.1 LBS | HEIGHT: 60 IN | DIASTOLIC BLOOD PRESSURE: 68 MMHG | OXYGEN SATURATION: 99 % | SYSTOLIC BLOOD PRESSURE: 110 MMHG | RESPIRATION RATE: 16 BRPM

## 2021-01-07 PROBLEM — E66.01 CLASS 2 SEVERE OBESITY DUE TO EXCESS CALORIES WITH SERIOUS COMORBIDITY AND BODY MASS INDEX (BMI) OF 38.0 TO 38.9 IN ADULT (HCC): Status: ACTIVE | Noted: 2021-01-07

## 2021-01-07 PROBLEM — E66.812 CLASS 2 SEVERE OBESITY DUE TO EXCESS CALORIES WITH SERIOUS COMORBIDITY AND BODY MASS INDEX (BMI) OF 38.0 TO 38.9 IN ADULT: Status: ACTIVE | Noted: 2021-01-07

## 2021-01-07 PROBLEM — E78.2 MIXED HYPERLIPIDEMIA: Status: ACTIVE | Noted: 2020-02-18

## 2021-01-07 LAB
ANION GAP SERPL CALCULATED.3IONS-SCNC: 12 MMOL/L (ref 9–17)
BNP INTERPRETATION: NORMAL
BUN BLDV-MCNC: 19 MG/DL (ref 6–20)
CHLORIDE BLD-SCNC: 103 MMOL/L (ref 98–107)
CO2: 22 MMOL/L (ref 20–31)
CREAT SERPL-MCNC: 0.55 MG/DL (ref 0.5–0.9)
GFR AFRICAN AMERICAN: >60 ML/MIN
GFR NON-AFRICAN AMERICAN: >60 ML/MIN
GFR SERPL CREATININE-BSD FRML MDRD: NORMAL ML/MIN/{1.73_M2}
GFR SERPL CREATININE-BSD FRML MDRD: NORMAL ML/MIN/{1.73_M2}
MAGNESIUM: 2 MG/DL (ref 1.6–2.6)
POTASSIUM SERPL-SCNC: 4.6 MMOL/L (ref 3.7–5.3)
PRO-BNP: <20 PG/ML
SODIUM BLD-SCNC: 137 MMOL/L (ref 135–144)

## 2021-01-07 PROCEDURE — 82565 ASSAY OF CREATININE: CPT

## 2021-01-07 PROCEDURE — 84520 ASSAY OF UREA NITROGEN: CPT

## 2021-01-07 PROCEDURE — 36415 COLL VENOUS BLD VENIPUNCTURE: CPT

## 2021-01-07 PROCEDURE — 80051 ELECTROLYTE PANEL: CPT

## 2021-01-07 PROCEDURE — 83735 ASSAY OF MAGNESIUM: CPT

## 2021-01-07 PROCEDURE — 83880 ASSAY OF NATRIURETIC PEPTIDE: CPT

## 2021-01-07 PROCEDURE — 99211 OFF/OP EST MAY X REQ PHY/QHP: CPT

## 2021-01-07 NOTE — PROGRESS NOTES
Weight is up only 3 pounds since last appointment. Patient has a lot of stress with her family. Verbally reviewed importance of medication compliance with patient; patient verbalized understanding. Discussed 2000mg/day sodium restricted diet; patient verbalized understanding. Moderate daily exercise encouraged as tolerated. Discussed rest breaks as needed; patient verbalized understanding. Patient instructed to weigh self at the same time of each day, using same clothes and same scale; reinforced teaching to monitor for 3-5 lb weight increase over 1-2 days, and to notify the CHF clinic at [phone] or physician office if weight change noted. Patient verbalized understanding. Risks of smoking discussed with the patient if applicable; patient strongly discouraged to smoke. Patient verbalized understanding. Signs and symptoms of CHF discussed with patient, such as feeling more tired than normal, feeling short of breath, coughing that increases when you lie down, sudden weight gain, swelling of your feet, legs or belly. Patient verbalized understanding to notify the CHF clinic at [phone] or physician office if these symptoms occur. Compliance with plan of care and further disease process causes discussed with patient, patient encouraged to keep all follow up appointments. Patient verbalized understanding.

## 2021-01-08 ENCOUNTER — OFFICE VISIT (OUTPATIENT)
Dept: FAMILY MEDICINE CLINIC | Age: 61
End: 2021-01-08
Payer: COMMERCIAL

## 2021-01-08 VITALS
WEIGHT: 194 LBS | DIASTOLIC BLOOD PRESSURE: 74 MMHG | TEMPERATURE: 98 F | OXYGEN SATURATION: 97 % | BODY MASS INDEX: 38.09 KG/M2 | SYSTOLIC BLOOD PRESSURE: 116 MMHG | HEART RATE: 73 BPM | HEIGHT: 60 IN

## 2021-01-08 DIAGNOSIS — I42.0 NONISCHEMIC DILATED CARDIOMYOPATHY (HCC): ICD-10-CM

## 2021-01-08 DIAGNOSIS — I10 ESSENTIAL HYPERTENSION: Primary | ICD-10-CM

## 2021-01-08 DIAGNOSIS — E11.9 TYPE 2 DIABETES MELLITUS WITHOUT COMPLICATION, WITHOUT LONG-TERM CURRENT USE OF INSULIN (HCC): ICD-10-CM

## 2021-01-08 DIAGNOSIS — E66.01 CLASS 2 SEVERE OBESITY DUE TO EXCESS CALORIES WITH SERIOUS COMORBIDITY AND BODY MASS INDEX (BMI) OF 38.0 TO 38.9 IN ADULT (HCC): ICD-10-CM

## 2021-01-08 DIAGNOSIS — E55.9 VITAMIN D DEFICIENCY: ICD-10-CM

## 2021-01-08 DIAGNOSIS — E78.2 MIXED HYPERLIPIDEMIA: ICD-10-CM

## 2021-01-08 LAB — HBA1C MFR BLD: 6.5 %

## 2021-01-08 PROCEDURE — 99214 OFFICE O/P EST MOD 30 MIN: CPT | Performed by: FAMILY MEDICINE

## 2021-01-08 PROCEDURE — 3017F COLORECTAL CA SCREEN DOC REV: CPT | Performed by: FAMILY MEDICINE

## 2021-01-08 PROCEDURE — G8427 DOCREV CUR MEDS BY ELIG CLIN: HCPCS | Performed by: FAMILY MEDICINE

## 2021-01-08 PROCEDURE — 1036F TOBACCO NON-USER: CPT | Performed by: FAMILY MEDICINE

## 2021-01-08 PROCEDURE — G8417 CALC BMI ABV UP PARAM F/U: HCPCS | Performed by: FAMILY MEDICINE

## 2021-01-08 PROCEDURE — 2022F DILAT RTA XM EVC RTNOPTHY: CPT | Performed by: FAMILY MEDICINE

## 2021-01-08 PROCEDURE — G8484 FLU IMMUNIZE NO ADMIN: HCPCS | Performed by: FAMILY MEDICINE

## 2021-01-08 PROCEDURE — 83036 HEMOGLOBIN GLYCOSYLATED A1C: CPT | Performed by: FAMILY MEDICINE

## 2021-01-08 PROCEDURE — 3044F HG A1C LEVEL LT 7.0%: CPT | Performed by: FAMILY MEDICINE

## 2021-01-08 ASSESSMENT — PATIENT HEALTH QUESTIONNAIRE - PHQ9
SUM OF ALL RESPONSES TO PHQ QUESTIONS 1-9: 0
SUM OF ALL RESPONSES TO PHQ9 QUESTIONS 1 & 2: 0

## 2021-01-08 ASSESSMENT — ENCOUNTER SYMPTOMS
ABDOMINAL PAIN: 0
COUGH: 0
SHORTNESS OF BREATH: 0
RESPIRATORY NEGATIVE: 1

## 2021-01-08 NOTE — PROGRESS NOTES
Visit Information    Have you changed or started any medications since your last visit including any over-the-counter medicines, vitamins, or herbal medicines? no   Are you having any side effects from any of your medications? -  no  Have you stopped taking any of your medications? Is so, why? -  no    Have you seen any other physician or provider since your last visit? No  Have you had any other diagnostic tests since your last visit? Yes - Records Obtained  Have you been seen in the emergency room and/or had an admission to a hospital since we last saw you? No  Have you had your routine dental cleaning in the past 6 months? no    Have you activated your [employer] account? If not, what are your barriers?  Yes     Patient Care Team:  MILLY David CNP as PCP - General (Family Medicine)  MILLY David CNP as PCP - [facility]  Daneil Halsted, MD as Consulting Physician (Cardiology)    Medical History Review  Past Medical, Family, and Social History reviewed and does contribute to the patient presenting condition    Health Maintenance   Topic Date Due    Hepatitis B vaccine (1 of 3 - Risk 3-dose series) 06/03/1980    Diabetic retinal exam  02/22/2020    Diabetic foot exam  06/11/2020    Flu vaccine (1) 09/01/2020    Shingles Vaccine (2 of 2) 10/04/2020    DTaP/Tdap/Td vaccine (1 - Tdap) 02/18/2021 (Originally 6/3/1980)    Lipid screen  02/28/2021    A1C test (Diabetic or Prediabetic)  08/07/2021    Diabetic microalbuminuria test  09/02/2021    Breast cancer screen  09/23/2021    Potassium monitoring  01/07/2022    Creatinine monitoring  01/07/2022    Cervical cancer screen  08/07/2025    Colon cancer screen colonoscopy  10/12/2028    Pneumococcal 0-64 years Vaccine  Completed    Hepatitis C screen  Completed    HIV screen  Completed    Hepatitis A vaccine  Aged Out    Hib vaccine  Aged Out    Meningococcal (ACWY) vaccine  Aged Out

## 2021-01-08 NOTE — PATIENT INSTRUCTIONS
Patient Education        Learning About Diabetes Food Guidelines  Your Care Instructions     Meal planning is important to manage diabetes. It helps keep your blood sugar at a target level (which you set with your doctor). You don't have to eat special foods. You can eat what your family eats, including sweets once in a while. But you do have to pay attention to how often you eat and how much you eat of certain foods. You may want to work with a dietitian or a certified diabetes educator (CDE) to help you plan meals and snacks. A dietitian or CDE can also help you lose weight if that is one of your goals. What should you know about eating carbs? Managing the amount of carbohydrate (carbs) you eat is an important part of healthy meals when you have diabetes. Carbohydrate is found in many foods. · Learn which foods have carbs. And learn the amounts of carbs in different foods. ? Bread, cereal, pasta, and rice have about 15 grams of carbs in a serving. A serving is 1 slice of bread (1 ounce), ½ cup of cooked cereal, or 1/3 cup of cooked pasta or rice. ? Fruits have 15 grams of carbs in a serving. A serving is 1 small fresh fruit, such as an apple or orange; ½ of a banana; ½ cup of cooked or canned fruit; ½ cup of fruit juice; 1 cup of melon or raspberries; or 2 tablespoons of dried fruit. ? Milk and no-sugar-added yogurt have 15 grams of carbs in a serving. A serving is 1 cup of milk or 2/3 cup of no-sugar-added yogurt. ? Starchy vegetables have 15 grams of carbs in a serving. A serving is ½ cup of mashed potatoes or sweet potato; 1 cup winter squash; ½ of a small baked potato; ½ cup of cooked beans; or ½ cup cooked corn or green peas. · Learn how much carbs to eat each day and at each meal. A dietitian or CDE can teach you how to keep track of the amount of carbs you eat. This is called carbohydrate counting. · If you are not sure how to count carbohydrate grams, use the Plate Method to plan meals. It is a good, quick way to make sure that you have a balanced meal. It also helps you spread carbs throughout the day. ? Divide your plate by types of foods. Put non-starchy vegetables on half the plate, meat or other protein food on one-quarter of the plate, and a grain or starchy vegetable in the final quarter of the plate. To this you can add a small piece of fruit and 1 cup of milk or yogurt, depending on how many carbs you are supposed to eat at a meal.  · Try to eat about the same amount of carbs at each meal. Do not \"save up\" your daily allowance of carbs to eat at one meal.  · Proteins have very little or no carbs per serving. Examples of proteins are beef, chicken, turkey, fish, eggs, tofu, cheese, cottage cheese, and peanut butter. A serving size of meat is 3 ounces, which is about the size of a deck of cards. Examples of meat substitute serving sizes (equal to 1 ounce of meat) are 1/4 cup of cottage cheese, 1 egg, 1 tablespoon of peanut butter, and ½ cup of tofu. How can you eat out and still eat healthy? · Learn to estimate the serving sizes of foods that have carbohydrate. If you measure food at home, it will be easier to estimate the amount in a serving of restaurant food. · If the meal you order has too much carbohydrate (such as potatoes, corn, or baked beans), ask to have a low-carbohydrate food instead. Ask for a salad or green vegetables. · If you use insulin, check your blood sugar before and after eating out to help you plan how much to eat in the future. · If you eat more carbohydrate at a meal than you had planned, take a walk or do other exercise. This will help lower your blood sugar. What else should you know? · Limit saturated fat, such as the fat from meat and dairy products. This is a healthy choice because people who have diabetes are at higher risk of heart disease. So choose lean cuts of meat and nonfat or low-fat dairy products. Use olive or canola oil instead of butter or shortening when cooking. · Don't skip meals. Your blood sugar may drop too low if you skip meals and take insulin or certain medicines for diabetes. · Check with your doctor before you drink alcohol. Alcohol can cause your blood sugar to drop too low. Alcohol can also cause a bad reaction if you take certain diabetes medicines. Follow-up care is a key part of your treatment and safety. Be sure to make and go to all appointments, and call your doctor if you are having problems. It's also a good idea to know your test results and keep a list of the medicines you take. Where can you learn more? Go to https://[email or web address].DokDok. org and sign in to your [employer] account. Enter U243 in the [employer] box to learn more about \"Learning About Diabetes Food Guidelines. \"     If you do not have an account, please click on the \"Sign Up Now\" link. Current as of: December 20, 2019               Content Version: 12.6  © 6703-6312 [employer]. Care instructions adapted under license by [facility] ([facility]). If you have questions about a medical condition or this instruction, always ask your healthcare professional. [place] [address] any warranty or liability for your use of this information.

## 2021-01-08 NOTE — PROGRESS NOTES
P PHYSICIANS  [facility]  DORENE Molina [facility] [address].  [address] [address]  Dept: [phone]     2021   Chief Complaint   Patient presents with    Diabetes    Hypertension     HPI  Maninder Gonazlez (:  1960) is a 62 y.o. female is an established patient. Patient has a history of Hypertension, DM, hyperlipidemia, cardiomyoathy, vitamin d deficiency, obesity. HYPERTENSION/CARDIOMYOPATHY  Maninder Gonzalez has a well controlled hypertension. she is currently on Entresto, coreg. Patient's most recent BP in the office was stable. she reports stable BP readings at home. Patient denies any adverse reaction to this therapy. she denies any CP, SOB, HA, or palpitations. Patient admits to exercising and adheres to low salt diet. No history of organ damage due to condition noted. Patient established with Dr. Yakov Norris. . -appt  Lab Results   Component Value Date/Time    CREATININE 0.55 2021 06:16 AM     DIABETES MELLITUS  Patient has a  stable Diabetes Mellitus. Patient's recent   Lab Results   Component Value Date    LABA1C 6.5 2021   . Current therapy includes Metformin. Patient is responding well with this therapy. Patient reports home glucose monitoring as Stable readings. Patient denieshypoglycemia episodes such as{symptoms. Patient denies neuropathy. Eye Exam: UTD  Foot Exam:UTD    HYPERLIPIDEMIA  Maninder Gonzalez is currently on atorvastatin (Lipitor). Patient denies adverse reaction to this medication. Compliance with treatment thus far has been good. The patient is known to have coexisting coronary artery disease.  The 10-year CVD risk score (D'Agostino, et al., 2008) is: 8.4%    Values used to calculate the score:      Age: 62 years      Sex: Female      Diabetic: Yes      Tobacco smoker: No      Systolic Blood Pressure: 454 mmHg      Is BP treated: Yes      HDL Cholesterol: 41 mg/dL      Total Cholesterol: 117 mg/dL  Lab Results   Component Value Date/Time CHOL 117 2020 08:08 AM    HDL 41 2020 08:08 AM    LDLCHOLESTEROL 55 2020 08:08 AM    TRIG 106 2020 08:08 AM    CHOLHDLRATIO 2.9 2020 08:08 AM    VLDL NOT REPORTED 2020 08:08 AM     VITAMIN D  Patient has a known Vitamin D Deficiency. she had a course of supplementation for 12 weeks. she is due to get levels check. We continue to discuss ways to manage this condition. We also discussed ways to improve the levels. Such as learning food groups that are high in Vitamin D. We also discuss that sun exposure is beneficial however, too much sun and sun damage can potentially result in skin cancer. Lab Results   Component Value Date/Time    VITD25 36.2 2020 06:15 AM      PHQ-9 Total Score: 0 (2021 11:56 AM)      [x]Negative depression screening.    []1-4 = Minimal depression   []5-9 = Mild depression   []10-14 = Moderate depression   []15-19 = Moderately severe depression   []20-27 = Severe depression  PHQ Scores 2021   PHQ2 Score 0 0 0 0   PHQ9 Score 0 0 0 0     Counseling given: Yes    Patient Active Problem List   Diagnosis    Type 2 diabetes mellitus without complication, without long-term current use of insulin ([facility] [address]    Essential hypertension    Nonischemic dilated cardiomyopathy (HCC)    Mixed hyperlipidemia    Abnormal stress test    Electrocardiogram abnormal    Vitamin D deficiency    Nail fungal infection    Class 2 severe obesity due to excess calories with serious comorbidity and body mass index (BMI) of 38.0 to 38.9 in adult [facility])      Past Medical History:   Diagnosis Date    Diabetes mellitus ([facility] [address]     Hypertension       Past Surgical History:   Procedure Laterality Date     SECTION      two    GA COLON CA SCRN NOT HI RSK IND N/A 10/12/2018    COLONOSCOPY performed by Nirmal Ortega MD at [id number]     Family History   Problem Relation Age of Onset    High Blood Pressure Mother    Georgianna Olszewski Other Mother glaucoma    Diabetes Mother     Diabetes Sister     High Blood Pressure Sister     High Cholesterol Sister     Cancer Brother      Current Outpatient Medications   Medication Sig Dispense Refill    ciclopirox (PENLAC) 8 % solution Apply topically nightly. 1 Bottle 3    carvedilol (COREG) 12.5 MG tablet Take 12.5 mg by mouth 2 times daily (with meals)      sacubitril-valsartan (ENTRESTO)  MG per tablet Take 1 tablet by mouth 2 times daily      Cholecalciferol (VITAMIN D3) 50 MCG (2000 UT) CAPS Take 2,000 Units by mouth      atorvastatin (LIPITOR) 20 MG tablet Take 20 mg by mouth      aspirin 81 MG tablet Take 81 mg by mouth      metFORMIN (GLUCOPHAGE) 500 MG tablet Take 500 mg by mouth 3 times daily      zoster recombinant adjuvanted vaccine [facility]) 50 MCG/0.5ML SUSR injection Inject 0.5 mLs into the muscle See Admin Instructions 1 dose now and repeat in 2-6 months (Patient not taking: Reported on 1/8/2021) 0.5 mL 0     No current facility-administered medications for this visit. Review of Systems   Constitutional: Positive for activity change. Negative for chills, fatigue and fever. HENT: Negative. Respiratory: Negative. Negative for cough and shortness of breath. Cardiovascular: Negative. Negative for chest pain and palpitations. Gastrointestinal: Negative for abdominal pain. Genitourinary: Negative for dysuria. Musculoskeletal: Negative for arthralgias and myalgias. Skin: Negative for rash. Allergic/Immunologic: Negative for environmental allergies. Neurological: Negative for light-headedness and headaches. Psychiatric/Behavioral: Negative for sleep disturbance. The patient is nervous/anxious. Prior to Visit Medications    Medication Sig Taking? Authorizing Provider   ciclopirox (PENLAC) 8 % solution Apply topically nightly.  Yes Concepción Gaston, APRN - CNP   carvedilol (COREG) 12.5 MG tablet Take 12.5 mg by mouth 2 times daily (with meals) Yes Historical Provider, MD   sacubitril-valsartan (ENTRESTO)  MG per tablet Take 1 tablet by mouth 2 times daily Yes Historical Provider, MD   Cholecalciferol (VITAMIN D3) 50 MCG (2000 UT) CAPS Take 2,000 Units by mouth Yes Historical Provider, MD   atorvastatin (LIPITOR) 20 MG tablet Take 20 mg by mouth Yes Historical Provider, MD   aspirin 81 MG tablet Take 81 mg by mouth Yes Historical Provider, MD   metFORMIN (GLUCOPHAGE) 500 MG tablet Take 500 mg by mouth 3 times daily Yes Historical Provider, MD   zoster recombinant adjuvanted vaccine [facility]) 50 MCG/0.5ML SUSR injection Inject 0.5 mLs into the muscle See Admin Instructions 1 dose now and repeat in 2-6 months  Patient not taking: Reported on 1/8/2021  Concepción Gaston APRN - CNP      Social History     Tobacco Use    Smoking status: Never Smoker    Smokeless tobacco: Never Used   Substance Use Topics    Alcohol use: No     Alcohol/week: 0.0 standard drinks      Vitals:    01/08/21 1155   BP: 116/74   Pulse: 73   Temp: 98 °F (36.7 °C)   TempSrc: Temporal   SpO2: 97%   Weight: 194 lb (88 kg)   Height: 5' (1.524 m)     Estimated body mass index is 37.89 kg/m² as calculated from the following:    Height as of this encounter: 5' (1.524 m). Weight as of this encounter: 194 lb (88 kg). Physical Exam  Vitals signs and nursing note reviewed. Constitutional:       Appearance: She is well-developed. She is obese. HENT:      Head: Normocephalic. Right Ear: Tympanic membrane and external ear normal.      Left Ear: Tympanic membrane and external ear normal.      Nose: Nose normal.      Mouth/Throat:      Mouth: Mucous membranes are moist.   Eyes:      Pupils: Pupils are equal, round, and reactive to light. Neck:      Musculoskeletal: Normal range of motion and neck supple. Thyroid: No thyromegaly. Vascular: No JVD. Cardiovascular:      Rate and Rhythm: Normal rate and regular rhythm.       Pulses:           Dorsalis pedis pulses are 3+ on the right side and 3+ on the left side. Posterior tibial pulses are 2+ on the right side and 2+ on the left side. Heart sounds: Normal heart sounds. No murmur. Pulmonary:      Effort: Pulmonary effort is normal. No respiratory distress. Breath sounds: Normal breath sounds. Chest:      Chest wall: No mass, deformity, swelling or tenderness. Breasts:         Right: Normal. No mass, nipple discharge or skin change. Left: Normal. No mass, nipple discharge or skin change. Abdominal:      General: Abdomen is protuberant. Bowel sounds are normal. There is no distension. Palpations: Abdomen is soft. Tenderness: There is no abdominal tenderness. Comments: obese   Genitourinary:     Pubic Area: No rash or pubic lice. Labia:         Right: No tenderness. Left: No tenderness. Urethra: No prolapse. Musculoskeletal: Normal range of motion. Right foot: No deformity. Left foot: No deformity. Feet:      Right foot:      Protective Sensation: 10 sites tested. 10 sites sensed. Skin integrity: Callus and dry skin present. No skin breakdown or erythema. Toenail Condition: Right toenails are abnormally thick. Fungal disease present. Left foot:      Protective Sensation: 10 sites tested. 10 sites sensed. Skin integrity: Callus and dry skin present. No skin breakdown or erythema. Toenail Condition: Left toenails are abnormally thick. Fungal disease present. Lymphadenopathy:      Cervical: No cervical adenopathy. Skin:     General: Skin is warm and dry. Capillary Refill: Capillary refill takes less than 2 seconds. Neurological:      Mental Status: She is alert and oriented to person, place, and time. Psychiatric:         Mood and Affect: Mood is anxious. Speech: Speech is rapid and pressured. Behavior: Behavior normal.       Most recent labs reviewed with patient, and all questions answered.   No results found for: encounter. There are no discontinued medications. Health Maintenance Due   Topic Date Due    Hepatitis B vaccine (1 of 3 - Risk 3-dose series) 06/03/1980    Diabetic retinal exam  02/22/2020    Diabetic foot exam  06/11/2020    Flu vaccine (1) 09/01/2020    Shingles Vaccine (2 of 2) 10/04/2020      Return in about 4 months (around 5/8/2021) for DM/A1c, 30mins. Colletta Balo received counseling on the following healthy behaviors: nutrition, exercise and medication adherence  Reviewed prior labs and health maintenance  Continue current medications, diet and exercise. Discussed use, benefit, and side effects of prescribed medications. Barriers to medication compliance addressed. Patient given educational materials - see patient instructions  Was a self-tracking handout given in paper form or via [employer]? Yes    Requested Prescriptions      No prescriptions requested or ordered in this encounter       All patient questions answered. Patient voiced understanding. Quality Measures    Body mass index is 37.89 kg/m². Elevated. Weight control planned discussed Healthy diet and regular exercise. BP: 116/74 Blood pressure is normal. Treatment plan consists of No treatment change needed. Lab Results   Component Value Date    LDLCHOLESTEROL 55 02/28/2020    (goal LDL reduction with dx if diabetes is 50% LDL reduction)      PHQ Scores 1/8/2021 2/18/2020 6/11/2019 11/9/2015   PHQ2 Score 0 0 0 0   PHQ9 Score 0 0 0 0     Interpretation of Total Score Depression Severity: 1-4 = Minimal depression, 5-9 = Mild depression, 10-14 = Moderate depression, 15-19 = Moderately severe depression, 20-27 = Severe depression   This note was completed by using the assistance of a speech-recognition program. However, inadvertent computerized transcription errors may be present.  Although every effort was made to ensure accuracy, no guarantees can be provided that every mistake has been identified and corrected by editing   An electronic signature was used to authenticate this note.   --Nida Tavares, MILLY - CNP on 1/8/2021 at 7:26 PM

## 2021-03-25 NOTE — TELEPHONE ENCOUNTER
Please Approve or Refuse.   Send to Pharmacy per Pt's Request:      Next Visit Date:  5/14/2021   Last Visit Date: 1/8/2021    Hemoglobin A1C (%)   Date Value   01/08/2021 6.5   08/07/2020 6.7 (A)   02/18/2020 6.7             ( goal A1C is < 7)   BP Readings from Last 3 Encounters:   01/08/21 116/74   01/07/21 110/68   08/07/20 124/78          (goal 120/80)  BUN   Date Value Ref Range Status   01/07/2021 19 6 - 20 mg/dL Final     CREATININE   Date Value Ref Range Status   01/07/2021 0.55 0.50 - 0.90 mg/dL Final     Potassium   Date Value Ref Range Status   01/07/2021 4.6 3.7 - 5.3 mmol/L Final

## 2021-05-13 ENCOUNTER — HOSPITAL ENCOUNTER (OUTPATIENT)
Dept: OTHER | Age: 61
Discharge: HOME | End: 2021-05-13
Payer: COMMERCIAL

## 2021-05-13 VITALS
OXYGEN SATURATION: 98 % | HEIGHT: 60 IN | DIASTOLIC BLOOD PRESSURE: 70 MMHG | HEART RATE: 97 BPM | SYSTOLIC BLOOD PRESSURE: 118 MMHG | BODY MASS INDEX: 38.38 KG/M2 | RESPIRATION RATE: 16 BRPM | WEIGHT: 195.5 LBS

## 2021-05-13 LAB
ANION GAP SERPL CALCULATED.3IONS-SCNC: 10 MMOL/L (ref 9–17)
BUN BLDV-MCNC: 22 MG/DL (ref 6–20)
CHLORIDE BLD-SCNC: 102 MMOL/L (ref 98–107)
CO2: 26 MMOL/L (ref 20–31)
CREAT SERPL-MCNC: 0.66 MG/DL (ref 0.5–0.9)
GFR AFRICAN AMERICAN: >60 ML/MIN
GFR NON-AFRICAN AMERICAN: >60 ML/MIN
GFR SERPL CREATININE-BSD FRML MDRD: ABNORMAL ML/MIN/{1.73_M2}
GFR SERPL CREATININE-BSD FRML MDRD: ABNORMAL ML/MIN/{1.73_M2}
POTASSIUM SERPL-SCNC: 4.6 MMOL/L (ref 3.7–5.3)
SODIUM BLD-SCNC: 138 MMOL/L (ref 135–144)

## 2021-05-13 PROCEDURE — 80051 ELECTROLYTE PANEL: CPT

## 2021-05-13 PROCEDURE — 82565 ASSAY OF CREATININE: CPT

## 2021-05-13 PROCEDURE — 99211 OFF/OP EST MAY X REQ PHY/QHP: CPT

## 2021-05-13 PROCEDURE — 36415 COLL VENOUS BLD VENIPUNCTURE: CPT

## 2021-05-13 PROCEDURE — 84520 ASSAY OF UREA NITROGEN: CPT

## 2021-07-08 NOTE — TELEPHONE ENCOUNTER
Please Approve or Refuse.   Send to Pharmacy per Pt's Request:      Next Visit Date:  7/16/2021   Last Visit Date: 1/8/2021    Hemoglobin A1C (%)   Date Value   01/08/2021 6.5   08/07/2020 6.7 (A)   02/18/2020 6.7             ( goal A1C is < 7)   BP Readings from Last 3 Encounters:   05/13/21 118/70   01/08/21 116/74   01/07/21 110/68          (goal 120/80)  BUN   Date Value Ref Range Status   05/13/2021 22 (H) 6 - 20 mg/dL Final     CREATININE   Date Value Ref Range Status   05/13/2021 0.66 0.50 - 0.90 mg/dL Final     Potassium   Date Value Ref Range Status   05/13/2021 4.6 3.7 - 5.3 mmol/L Final

## 2021-07-13 ASSESSMENT — ENCOUNTER SYMPTOMS
COUGH: 0
ABDOMINAL PAIN: 0
SHORTNESS OF BREATH: 0
RESPIRATORY NEGATIVE: 1

## 2021-07-13 NOTE — PROGRESS NOTES
P PHYSICIANS  [facility] FAMILY PHYSICIANS [facility]  Tiffanie Molina [facility].  [address] [address]  Dept: [phone]     2021   Chief Complaint   Patient presents with    Diabetes    Hypertension    Hyperlipidemia     HPI  Joe Montanez (:  1960) is a 62 y.o. female is an established patient. Patient has a history of Hypertension, DM, hyperlipidemia, cardiomyoathy, vitamin d deficiency, obesity. Moderna Vaccine. done  Shingles vaccine done    HYPERTENSION/CARDIOMYOPATHY  Joe Montanez has a well controlled hypertension. she is currently on Entresto, coreg. Patient's most recent BP in the office was stable. she reports stable BP readings at home. Patient denies any adverse reaction to this therapy. she denies any CP, SOB, HA, or palpitations. Patient admits to exercising and adheres to low salt diet. No history of organ damage due to condition noted. Patient established with Dr. Cliff Vo. 2 months ago -appointment. No changes  Lab Results   Component Value Date/Time    CREATININE 0.66 2021 06:22 AM     DIABETES MELLITUS  Patient has a  stable Diabetes Mellitus. Current therapy includes Metformin. Patient is responding well with this therapy. Patient reports home glucose monitoring as Stable readings. Patient denieshypoglycemia episodes such as{symptoms. Patient denies neither vomiting nor diarrhea. Eye Exam: due  Foot Exam:UTD  Lab Results   Component Value Date/Time    LABA1C 6.9 2021 11:48 AM    LABA1C 6.5 2021 12:09 PM      HYPERLIPIDEMIA  Joe Montanez is currently on atorvastatin (Lipitor). Patient denies adverse reaction to this medication. Compliance with treatment thus far has been good. The patient is known to have coexisting coronary artery disease.  The 10-year CVD risk score (D'Agostino, et al., 2008) is: 6.8%    Values used to calculate the score:      Age: 62 years      Sex: Female      Diabetic: Yes      Tobacco smoker: No      Systolic Blood Pressure: 790 mmHg      Is BP treated: Yes      HDL Cholesterol: 41 mg/dL      Total Cholesterol: 117 mg/dL  Lab Results   Component Value Date/Time    CHOL 117 02/28/2020 08:08 AM    HDL 41 02/28/2020 08:08 AM    LDLCHOLESTEROL 55 02/28/2020 08:08 AM    TRIG 106 02/28/2020 08:08 AM    CHOLHDLRATIO 2.9 02/28/2020 08:08 AM    VLDL NOT REPORTED 02/28/2020 08:08 AM     VITAMIN D  Patient has a known Vitamin D Deficiency. she had a course of supplementation for 12 weeks. she is due to get levels check. We continue to discuss ways to manage this condition. We also discussed ways to improve the levels. Such as learning food groups that are high in Vitamin D. We also discuss that sun exposure is beneficial however, too much sun and sun damage can potentially result in skin cancer. Lab Results   Component Value Date/Time    VITD25 36.2 09/02/2020 06:15 AM      OBESITY  Patient's BMI is Body mass index is 38.28 kg/m². kg/m2. BMI is increasing. Patient understands that this condition increases the patient's risk for chronic conditions. Patient advised to practice healthy eating habits. Diet should include plenty of fruits, vegetables, whole grains, lean protein, and low-fat dairy. Increase water consumption. Reduce consumption of dietary sugar and sugar-sweetened beverages. Increasing physical exercises at least 3-4 times a week. We will monitor progression of condition. No data recorded      [x]Negative depression screening.    []1-4 = Minimal depression   []5-9 = Mild depression   []10-14 = Moderate depression   []15-19 = Moderately severe depression   []20-27 = Severe depression  PHQ Scores 1/8/2021 2/18/2020 6/11/2019 11/9/2015   PHQ2 Score 0 0 0 0   PHQ9 Score 0 0 0 0     Counseling given: Yes    Patient Active Problem List   Diagnosis    Type 2 diabetes mellitus without complication, without long-term current use of insulin ([address]    Essential hypertension    Nonischemic dilated cardiomyopathy ([address]    Mixed hyperlipidemia    Abnormal stress test    Electrocardiogram abnormal    Vitamin D deficiency    Nail fungal infection    Class 2 severe obesity due to excess calories with serious comorbidity and body mass index (BMI) of 38.0 to 38.9 in adult [facility])      Past Medical History:   Diagnosis Date    Diabetes mellitus ([address]     Hypertension       Past Surgical History:   Procedure Laterality Date     SECTION      two    MO COLON CA SCRN NOT HI RSK IND N/A 10/12/2018    COLONOSCOPY performed by Derik Serna [facility] at [facility] History   Problem Relation Age of Onset    High Blood Pressure Mother     Other Mother         glaucoma    Diabetes Mother     Diabetes Sister     High Blood Pressure Sister     High Cholesterol Sister     Cancer Brother      Current Outpatient Medications   Medication Sig Dispense Refill    metFORMIN (GLUCOPHAGE) 500 MG tablet take 1 tablet by mouth three times a day 270 tablet 0    ciclopirox (PENLAC) 8 % solution Apply topically nightly. 1 Bottle 3    carvedilol (COREG) 12.5 MG tablet Take 12.5 mg by mouth 2 times daily (with meals)      sacubitril-valsartan (ENTRESTO)  MG per tablet Take 1 tablet by mouth 2 times daily      Cholecalciferol (VITAMIN D3) 50 MCG ( UT) CAPS Take 2,000 Units by mouth      atorvastatin (LIPITOR) 20 MG tablet Take 20 mg by mouth      aspirin 81 MG tablet Take 81 mg by mouth       No current facility-administered medications for this visit. Review of Systems   Constitutional: Positive for unexpected weight change. Negative for chills, fatigue and fever. HENT: Negative. Respiratory: Negative. Negative for cough and shortness of breath. Cardiovascular: Negative. Negative for chest pain and palpitations. Gastrointestinal: Negative for abdominal pain. Genitourinary: Negative for dysuria. Musculoskeletal: Negative for arthralgias and myalgias. Skin: Negative for rash.         Toe fungal infection Allergic/Immunologic: Positive for environmental allergies. Neurological: Negative for light-headedness and headaches. Psychiatric/Behavioral: Positive for sleep disturbance. Negative for suicidal ideas. The patient is nervous/anxious. Physical Exam  Vitals and nursing note reviewed. Constitutional:       Appearance: She is well-developed. She is obese. HENT:      Head: Normocephalic. Right Ear: External ear normal.      Left Ear: External ear normal.      Nose: Nose normal.      Mouth/Throat:      Mouth: Mucous membranes are moist.   Eyes:      Pupils: Pupils are equal, round, and reactive to light. Cardiovascular:      Rate and Rhythm: Normal rate and regular rhythm. Pulses: Normal pulses. Heart sounds: Normal heart sounds. Pulmonary:      Effort: Pulmonary effort is normal. No respiratory distress. Breath sounds: Normal breath sounds. Abdominal:      General: Abdomen is protuberant. Bowel sounds are normal. There is no distension. Palpations: Abdomen is soft. Tenderness: There is no abdominal tenderness. Comments: Obese   Musculoskeletal:         General: Normal range of motion. Cervical back: Normal range of motion and neck supple. Feet:      Right foot:      Toenail Condition: Right toenails are abnormally thick. Fungal disease present. Left foot:      Toenail Condition: Left toenails are abnormally thick. Fungal disease present. Comments: Improving  Skin:     General: Skin is warm and dry. Capillary Refill: Capillary refill takes less than 2 seconds. Neurological:      Mental Status: She is alert and oriented to person, place, and time. Psychiatric:         Mood and Affect: Mood is anxious. Speech: Speech is rapid and pressured. Behavior: Behavior normal.         Thought Content: Thought content does not include homicidal or suicidal ideation. Prior to Visit Medications    Medication Sig Taking?  Authorizing Provider   metFORMIN (GLUCOPHAGE) 500 MG tablet take 1 tablet by mouth three times a day Yes MILLY Swann CNP   ciclopirox (PENLAC) 8 % solution Apply topically nightly. Yes MILLY Swann CNP   carvedilol (COREG) 12.5 MG tablet Take 12.5 mg by mouth 2 times daily (with meals) Yes Historical Provider, MD   sacubitril-valsartan (ENTRESTO)  MG per tablet Take 1 tablet by mouth 2 times daily Yes Historical Provider, MD   Cholecalciferol (VITAMIN D3) 50 MCG (2000 UT) CAPS Take 2,000 Units by mouth Yes Historical Provider, MD   atorvastatin (LIPITOR) 20 MG tablet Take 20 mg by mouth Yes Historical Provider, MD   aspirin 81 MG tablet Take 81 mg by mouth Yes Historical Provider, MD      Social History     Tobacco Use    Smoking status: Never Smoker    Smokeless tobacco: Never Used   Substance Use Topics    Alcohol use: No     Alcohol/week: 0.0 standard drinks      Vitals:    07/16/21 1127   BP: 106/68   Pulse: 72   Temp: 96.4 °F (35.8 °C)   SpO2: 97%   Weight: 196 lb (88.9 kg)   Height: 5' (1.524 m)     Estimated body mass index is 38.28 kg/m² as calculated from the following:    Height as of this encounter: 5' (1.524 m). Weight as of this encounter: 196 lb (88.9 kg). ASSESSMENT/PLAN:  1. Nonischemic dilated cardiomyopathy (HCC)  Stable  DISCUSSED and ADVISED TO:  Discussed serious causes of CP. Advised to go to the ER for worsening CP/SOB           2. Essential hypertension  Stable  Continue current therapy. DISCUSSED AND ADVISED TO:  Cut down on your salt intake. Cut down on caffeinated drinks, sports drinks. Instructed to check BP at home regularly. Report for any chest pains, shortness of breath, headaches, and lightheadedness. Call the office if your blood pressure continue to be higher than 140/90 or 90/50. 3. Type 2 diabetes mellitus without complication, without long-term current use of insulin (HCC)  Stable  Continue therapy.   We will continue to monitor Hemoglobin A1c   DISCUSSED and ADVISED TO:  Continue to check Glucose levels at home. Report increased and low levels as discussed. Decrease carbohydrates, sugary drinks, desserts in your diet. Exercise regularly, as tolerated. Try to lose weight.      - CBC Auto Differential; Future  - Comprehensive Metabolic Panel, Fasting; Future  - Urinalysis Reflex to Culture; Future  - POCT glycosylated hemoglobin (Hb A1C); Future  - POCT glycosylated hemoglobin (Hb A1C)    4. Mixed hyperlipidemia  Stable  Continue therapy. Advised to decrease the consumption of red meats, fried foods, trans fats, sweets, sugary beverages. Advised to increase fish, vegetables, and fruits consumption. Advised to add fiber or OTC supplements in diet. Discussed weight loss which will result in improvement of lipids levels. Advised to increase daily physical activities and add regular exercises. - Lipid, Fasting; Future    5. Vitamin D deficiency  Continue Vitamin D supplementation  DISCUSSED AND ADVISED TO:  Foods that contain a lot of vitamin D includes [place], tuna, and mackerel. Cheese, egg yolks, and beef liver have small amounts of vit D.  Milk, soy drinks, orange juice, yogurt, margarine, and some kinds of cereal have vitamin D added to them. Continue to use sunblock when out in the sun to prevent skin cancer.    - Vitamin D 25 Hydroxy; Future    6. Nail fungal infection  Stable  Continue therapy  Continue to monitor      7. Class 2 severe obesity due to excess calories with serious comorbidity and body mass index (BMI) of 38.0 to 38.9 in adult (HCC)  BMI increasing  DISCUSSED AND ADVISED TO:  Eat a low-fat and low carbohydrates diet. Avoid fried foods especially fast food. Choose healthier options for snacks. Have 5-6 servings of fruits and vegetables per day. Cut down on eating processed food. Add 30 minutes to 1 hour aerobic exercise for 3-4 days a week.            Controlled Substance Monitoring:  Acute and Chronic Pain Monitoring:   No flowsheet data found. Orders Placed This Encounter   Procedures    CBC Auto Differential     Standing Status:   Future     Standing Expiration Date:   1/13/2023    Comprehensive Metabolic Panel, Fasting     Standing Status:   Future     Standing Expiration Date:   1/13/2023    Lipid, Fasting     Standing Status:   Future     Standing Expiration Date:   1/13/2023    Urinalysis Reflex to Culture     Standing Status:   Future     Standing Expiration Date:   1/13/2023     Order Specific Question:   SPECIFY(EX-CATH,MIDSTREAM,CYSTO,ETC)? Answer:   midstream    Vitamin D 25 Hydroxy     Standing Status:   Future     Standing Expiration Date:   1/13/2023    POCT glycosylated hemoglobin (Hb A1C)     Standing Status:   Future     Standing Expiration Date:   1/13/2023     No orders of the defined types were placed in this encounter. There are no discontinued medications. Health Maintenance Due   Topic Date Due    DTaP/Tdap/Td vaccine (1 - Tdap) Never done    Diabetic retinal exam  02/22/2020    Diabetic foot exam  06/11/2020    Lipid screen  02/28/2021      Return in about 4 months (around 11/16/2021) for Chronic conditions, 30mins. Shayla Mann received counseling on the following healthy behaviors: nutrition, exercise and medication adherence  Reviewed prior labs and health maintenance  Continue current medications, diet and exercise. Discussed use, benefit, and side effects of prescribed medications. Barriers to medication compliance addressed. Patient given educational materials - see patient instructions  Was a self-tracking handout given in paper form or via [employer]? Yes    Requested Prescriptions      No prescriptions requested or ordered in this encounter       All patient questions answered. Patient voiced understanding. Quality Measures    Body mass index is 38.28 kg/m². Elevated. Weight control planned discussed Healthy diet and regular exercise.     BP: 106/68 Blood pressure is normal. Treatment plan consists of No treatment change needed. Lab Results   Component Value Date    LDLCHOLESTEROL 55 02/28/2020    (goal LDL reduction with dx if diabetes is 50% LDL reduction)      PHQ Scores 1/8/2021 2/18/2020 6/11/2019 11/9/2015   PHQ2 Score 0 0 0 0   PHQ9 Score 0 0 0 0     Interpretation of Total Score Depression Severity: 1-4 = Minimal depression, 5-9 = Mild depression, 10-14 = Moderate depression, 15-19 = Moderately severe depression, 20-27 = Severe depression   This note was completed by using the assistance of a speech-recognition program. However, inadvertent computerized transcription errors may be present. Although every effort was made to ensure accuracy, no guarantees can be provided that every mistake has been identified and corrected by editing   An electronic signature was used to authenticate this note.   --Prema Clarke, APRN - CNP on 7/16/2021 at 11:45 AM

## 2021-07-16 ENCOUNTER — OFFICE VISIT (OUTPATIENT)
Dept: FAMILY MEDICINE CLINIC | Age: 61
End: 2021-07-16
Payer: COMMERCIAL

## 2021-07-16 VITALS
OXYGEN SATURATION: 97 % | DIASTOLIC BLOOD PRESSURE: 68 MMHG | WEIGHT: 196 LBS | HEART RATE: 72 BPM | HEIGHT: 60 IN | BODY MASS INDEX: 38.48 KG/M2 | TEMPERATURE: 96.4 F | SYSTOLIC BLOOD PRESSURE: 106 MMHG

## 2021-07-16 DIAGNOSIS — E11.9 TYPE 2 DIABETES MELLITUS WITHOUT COMPLICATION, WITHOUT LONG-TERM CURRENT USE OF INSULIN (HCC): ICD-10-CM

## 2021-07-16 DIAGNOSIS — B35.1 NAIL FUNGAL INFECTION: ICD-10-CM

## 2021-07-16 DIAGNOSIS — I10 ESSENTIAL HYPERTENSION: ICD-10-CM

## 2021-07-16 DIAGNOSIS — E55.9 VITAMIN D DEFICIENCY: ICD-10-CM

## 2021-07-16 DIAGNOSIS — E66.01 CLASS 2 SEVERE OBESITY DUE TO EXCESS CALORIES WITH SERIOUS COMORBIDITY AND BODY MASS INDEX (BMI) OF 38.0 TO 38.9 IN ADULT (HCC): ICD-10-CM

## 2021-07-16 DIAGNOSIS — E78.2 MIXED HYPERLIPIDEMIA: ICD-10-CM

## 2021-07-16 DIAGNOSIS — I42.0 NONISCHEMIC DILATED CARDIOMYOPATHY (HCC): Primary | ICD-10-CM

## 2021-07-16 LAB — HBA1C MFR BLD: 6.9 %

## 2021-07-16 PROCEDURE — 81003 URINALYSIS AUTO W/O SCOPE: CPT | Performed by: FAMILY MEDICINE

## 2021-07-16 PROCEDURE — G8417 CALC BMI ABV UP PARAM F/U: HCPCS | Performed by: FAMILY MEDICINE

## 2021-07-16 PROCEDURE — 3044F HG A1C LEVEL LT 7.0%: CPT | Performed by: FAMILY MEDICINE

## 2021-07-16 PROCEDURE — G8427 DOCREV CUR MEDS BY ELIG CLIN: HCPCS | Performed by: FAMILY MEDICINE

## 2021-07-16 PROCEDURE — 3017F COLORECTAL CA SCREEN DOC REV: CPT | Performed by: FAMILY MEDICINE

## 2021-07-16 PROCEDURE — 1036F TOBACCO NON-USER: CPT | Performed by: FAMILY MEDICINE

## 2021-07-16 PROCEDURE — 99214 OFFICE O/P EST MOD 30 MIN: CPT | Performed by: FAMILY MEDICINE

## 2021-07-16 PROCEDURE — 83036 HEMOGLOBIN GLYCOSYLATED A1C: CPT | Performed by: FAMILY MEDICINE

## 2021-07-16 PROCEDURE — 2022F DILAT RTA XM EVC RTNOPTHY: CPT | Performed by: FAMILY MEDICINE

## 2021-07-16 SDOH — ECONOMIC STABILITY: FOOD INSECURITY: WITHIN THE PAST 12 MONTHS, THE FOOD YOU BOUGHT JUST DIDN'T LAST AND YOU DIDN'T HAVE MONEY TO GET MORE.: NEVER TRUE

## 2021-07-16 SDOH — ECONOMIC STABILITY: FOOD INSECURITY: WITHIN THE PAST 12 MONTHS, YOU WORRIED THAT YOUR FOOD WOULD RUN OUT BEFORE YOU GOT MONEY TO BUY MORE.: NEVER TRUE

## 2021-07-16 ASSESSMENT — SOCIAL DETERMINANTS OF HEALTH (SDOH): HOW HARD IS IT FOR YOU TO PAY FOR THE VERY BASICS LIKE FOOD, HOUSING, MEDICAL CARE, AND HEATING?: HARD

## 2021-07-16 NOTE — PATIENT INSTRUCTIONS

## 2021-07-16 NOTE — PROGRESS NOTES
Visit Information    Have you changed or started any medications since your last visit including any over-the-counter medicines, vitamins, or herbal medicines? no   Are you having any side effects from any of your medications? -  no  Have you stopped taking any of your medications? Is so, why? -  no    Have you seen any other physician or provider since your last visit? yes  Have you had any other diagnostic tests since your last visit? No  Have you been seen in the emergency room and/or had an admission to a hospital since we last saw you? No  Have you had your routine dental cleaning in the past 6 months? yes     Have you activated your [employer] account? If not, what are your barriers?  No:     Patient Care Team:  MILLY Swann CNP as PCP - General (Family Medicine)  MILLY cMneill CNP as PCP - [facility] Provider  Lisa Hodges MD as Consulting Physician (Cardiology)    Medical History Review  Past Medical, Family, and Social History reviewed and does contribute to the patient presenting condition    Health Maintenance   Topic Date Due    DTaP/Tdap/Td vaccine (1 - Tdap) Never done    Diabetic retinal exam  02/22/2020    Diabetic foot exam  06/11/2020    Lipid screen  02/28/2021    Flu vaccine (1) 09/01/2021    Diabetic microalbuminuria test  09/02/2021    Breast cancer screen  09/23/2021    A1C test (Diabetic or Prediabetic)  01/08/2022    Potassium monitoring  05/13/2022    Creatinine monitoring  05/13/2022    Cervical cancer screen  08/07/2025    Pneumococcal 0-64 years Vaccine (2 of 2 - PPSV23) 06/03/2026    Colon cancer screen colonoscopy  10/12/2028    Shingles Vaccine  Completed    COVID-19 Vaccine  Completed    Hepatitis C screen  Completed    HIV screen  Completed    Hepatitis A vaccine  Aged Out    Hib vaccine  Aged Out    Meningococcal (ACWY) vaccine  Aged Out

## 2021-09-27 ENCOUNTER — HOSPITAL ENCOUNTER (OUTPATIENT)
Age: 61
Discharge: HOME | End: 2021-09-27
Payer: COMMERCIAL

## 2021-09-27 DIAGNOSIS — E78.2 MIXED HYPERLIPIDEMIA: ICD-10-CM

## 2021-09-27 DIAGNOSIS — E55.9 VITAMIN D DEFICIENCY: ICD-10-CM

## 2021-09-27 DIAGNOSIS — E11.9 TYPE 2 DIABETES MELLITUS WITHOUT COMPLICATION, WITHOUT LONG-TERM CURRENT USE OF INSULIN (HCC): ICD-10-CM

## 2021-09-27 LAB
-: ABNORMAL
ABSOLUTE EOS #: 0.2 K/UL (ref 0–0.4)
ABSOLUTE IMMATURE GRANULOCYTE: ABNORMAL K/UL (ref 0–0.3)
ABSOLUTE LYMPH #: 1.9 K/UL (ref 1–4.8)
ABSOLUTE MONO #: 0.4 K/UL (ref 0.1–1.3)
ALBUMIN SERPL-MCNC: 4 G/DL (ref 3.5–5.2)
ALBUMIN/GLOBULIN RATIO: ABNORMAL (ref 1–2.5)
ALP BLD-CCNC: 83 U/L (ref 35–104)
ALT SERPL-CCNC: 9 U/L (ref 5–33)
AMORPHOUS: ABNORMAL
ANION GAP SERPL CALCULATED.3IONS-SCNC: 10 MMOL/L (ref 9–17)
AST SERPL-CCNC: 16 U/L
BACTERIA: ABNORMAL
BASOPHILS # BLD: 1 % (ref 0–2)
BASOPHILS ABSOLUTE: 0 K/UL (ref 0–0.2)
BILIRUB SERPL-MCNC: 0.73 MG/DL (ref 0.3–1.2)
BILIRUBIN URINE: NEGATIVE
BUN BLDV-MCNC: 12 MG/DL (ref 8–23)
BUN/CREAT BLD: ABNORMAL (ref 9–20)
CALCIUM SERPL-MCNC: 9.6 MG/DL (ref 8.6–10.4)
CASTS UA: ABNORMAL /LPF
CHLORIDE BLD-SCNC: 108 MMOL/L (ref 98–107)
CHOLESTEROL, FASTING: 120 MG/DL
CHOLESTEROL/HDL RATIO: 2.9
CO2: 24 MMOL/L (ref 20–31)
COLOR: YELLOW
COMMENT UA: ABNORMAL
CREAT SERPL-MCNC: 0.57 MG/DL (ref 0.5–0.9)
CRYSTALS, UA: ABNORMAL /HPF
DIFFERENTIAL TYPE: ABNORMAL
EOSINOPHILS RELATIVE PERCENT: 3 % (ref 0–4)
EPITHELIAL CELLS UA: ABNORMAL /HPF
GFR AFRICAN AMERICAN: >60 ML/MIN
GFR NON-AFRICAN AMERICAN: >60 ML/MIN
GFR SERPL CREATININE-BSD FRML MDRD: ABNORMAL ML/MIN/{1.73_M2}
GFR SERPL CREATININE-BSD FRML MDRD: ABNORMAL ML/MIN/{1.73_M2}
GLUCOSE FASTING: 124 MG/DL (ref 70–99)
GLUCOSE URINE: NEGATIVE
HCT VFR BLD CALC: 43.4 % (ref 36–46)
HDLC SERPL-MCNC: 41 MG/DL
HEMOGLOBIN: 14 G/DL (ref 12–16)
IMMATURE GRANULOCYTES: ABNORMAL %
KETONES, URINE: NEGATIVE
LDL CHOLESTEROL: 62 MG/DL (ref 0–130)
LEUKOCYTE ESTERASE, URINE: NEGATIVE
LYMPHOCYTES # BLD: 31 % (ref 24–44)
MCH RBC QN AUTO: 27.5 PG (ref 26–34)
MCHC RBC AUTO-ENTMCNC: 32.2 G/DL (ref 31–37)
MCV RBC AUTO: 85.4 FL (ref 80–100)
MONOCYTES # BLD: 6 % (ref 1–7)
MUCUS: ABNORMAL
NITRITE, URINE: NEGATIVE
NRBC AUTOMATED: ABNORMAL PER 100 WBC
OTHER OBSERVATIONS UA: ABNORMAL
PDW BLD-RTO: 15.1 % (ref 11.5–14.9)
PH UA: 5 (ref 5–8)
PLATELET # BLD: 275 K/UL (ref 150–450)
PLATELET ESTIMATE: ABNORMAL
PMV BLD AUTO: 8.3 FL (ref 6–12)
POTASSIUM SERPL-SCNC: 4.7 MMOL/L (ref 3.7–5.3)
PROTEIN UA: NEGATIVE
RBC # BLD: 5.08 M/UL (ref 4–5.2)
RBC # BLD: ABNORMAL 10*6/UL
RBC UA: ABNORMAL /HPF
RENAL EPITHELIAL, UA: ABNORMAL /HPF
SEG NEUTROPHILS: 59 % (ref 36–66)
SEGMENTED NEUTROPHILS ABSOLUTE COUNT: 3.8 K/UL (ref 1.3–9.1)
SODIUM BLD-SCNC: 142 MMOL/L (ref 135–144)
SPECIFIC GRAVITY UA: 1.02 (ref 1–1.03)
TOTAL PROTEIN: 7.2 G/DL (ref 6.4–8.3)
TRICHOMONAS: ABNORMAL
TRIGLYCERIDE, FASTING: 86 MG/DL
TURBIDITY: ABNORMAL
URINE HGB: NEGATIVE
UROBILINOGEN, URINE: NORMAL
VITAMIN D 25-HYDROXY: 42 NG/ML (ref 30–100)
VLDLC SERPL CALC-MCNC: NORMAL MG/DL (ref 1–30)
WBC # BLD: 6.4 K/UL (ref 3.5–11)
WBC # BLD: ABNORMAL 10*3/UL
WBC UA: ABNORMAL /HPF
YEAST: ABNORMAL

## 2021-09-27 PROCEDURE — 80053 COMPREHEN METABOLIC PANEL: CPT

## 2021-09-27 PROCEDURE — 82306 VITAMIN D 25 HYDROXY: CPT

## 2021-09-27 PROCEDURE — 36415 COLL VENOUS BLD VENIPUNCTURE: CPT

## 2021-09-27 PROCEDURE — 85025 COMPLETE CBC W/AUTO DIFF WBC: CPT

## 2021-09-27 PROCEDURE — 81001 URINALYSIS AUTO W/SCOPE: CPT

## 2021-09-27 PROCEDURE — 80061 LIPID PANEL: CPT

## 2021-09-27 NOTE — TELEPHONE ENCOUNTER
Please Approve or Refuse.   Send to Pharmacy per Pt's Request:      Next Visit Date:  11/19/2021   Last Visit Date: 7/16/2021    Hemoglobin A1C (%)   Date Value   07/16/2021 6.9   01/08/2021 6.5   08/07/2020 6.7 (A)             ( goal A1C is < 7)   BP Readings from Last 3 Encounters:   07/16/21 106/68   05/13/21 118/70   01/08/21 116/74          (goal 120/80)  BUN   Date Value Ref Range Status   09/27/2021 12 8 - 23 mg/dL Final     CREATININE   Date Value Ref Range Status   09/27/2021 0.57 0.50 - 0.90 mg/dL Final     Potassium   Date Value Ref Range Status   09/27/2021 4.7 3.7 - 5.3 mmol/L Final

## 2021-11-01 ENCOUNTER — HOSPITAL ENCOUNTER (OUTPATIENT)
Dept: OTHER | Age: 61
Discharge: HOME | End: 2021-11-01
Payer: COMMERCIAL

## 2021-11-01 VITALS
BODY MASS INDEX: 37.76 KG/M2 | WEIGHT: 192.3 LBS | HEIGHT: 60 IN | OXYGEN SATURATION: 100 % | HEART RATE: 79 BPM | SYSTOLIC BLOOD PRESSURE: 122 MMHG | RESPIRATION RATE: 16 BRPM | DIASTOLIC BLOOD PRESSURE: 72 MMHG

## 2021-11-01 LAB
ANION GAP SERPL CALCULATED.3IONS-SCNC: 8 MMOL/L (ref 9–17)
BNP INTERPRETATION: NORMAL
BUN BLDV-MCNC: 21 MG/DL (ref 8–23)
CHLORIDE BLD-SCNC: 108 MMOL/L (ref 98–107)
CO2: 26 MMOL/L (ref 20–31)
CREAT SERPL-MCNC: 0.76 MG/DL (ref 0.5–0.9)
GFR AFRICAN AMERICAN: >60 ML/MIN
GFR NON-AFRICAN AMERICAN: >60 ML/MIN
GFR SERPL CREATININE-BSD FRML MDRD: NORMAL ML/MIN/{1.73_M2}
GFR SERPL CREATININE-BSD FRML MDRD: NORMAL ML/MIN/{1.73_M2}
POTASSIUM SERPL-SCNC: 4.8 MMOL/L (ref 3.7–5.3)
PRO-BNP: <20 PG/ML
SODIUM BLD-SCNC: 142 MMOL/L (ref 135–144)

## 2021-11-01 PROCEDURE — 99211 OFF/OP EST MAY X REQ PHY/QHP: CPT

## 2021-11-01 PROCEDURE — 83880 ASSAY OF NATRIURETIC PEPTIDE: CPT

## 2021-11-01 PROCEDURE — 84520 ASSAY OF UREA NITROGEN: CPT

## 2021-11-01 PROCEDURE — 36415 COLL VENOUS BLD VENIPUNCTURE: CPT

## 2021-11-01 PROCEDURE — 82565 ASSAY OF CREATININE: CPT

## 2021-11-01 PROCEDURE — 80051 ELECTROLYTE PANEL: CPT

## 2021-11-01 NOTE — PROGRESS NOTES
Patient doing very well. No changes at all today. Cardiology will see patient dec. 2.    Verbally reviewed importance of medication compliance with patient; patient verbalized understanding. Discussed 2000mg/day sodium restricted diet; patient verbalized understanding. Moderate daily exercise encouraged as tolerated. Discussed rest breaks as needed; patient verbalized understanding. Patient instructed to weigh self at the same time of each day, using same clothes and same scale; reinforced teaching to monitor for 3-5 lb weight increase over 1-2 days, and to notify the CHF clinic at [phone] or physician office if weight change noted. Patient verbalized understanding. Risks of smoking discussed with the patient if applicable; patient strongly discouraged to smoke. Patient verbalized understanding. Signs and symptoms of CHF discussed with patient, such as feeling more tired than normal, feeling short of breath, coughing that increases when you lie down, sudden weight gain, swelling of your feet, legs or belly. Patient verbalized understanding to notify the CHF clinic at [phone] or physician office if these symptoms occur. Compliance with plan of care and further disease process causes discussed with patient, patient encouraged to keep all follow up appointments. Patient verbalized understanding.

## 2021-11-18 ASSESSMENT — ENCOUNTER SYMPTOMS
ABDOMINAL PAIN: 0
RESPIRATORY NEGATIVE: 1
SHORTNESS OF BREATH: 0
COUGH: 0

## 2021-11-18 NOTE — PROGRESS NOTES
P PHYSICIANS  [facility]  [address]  [address] [address]  Dept: [phone]     2021   Chief Complaint   Patient presents with    Diabetes    Hypertension       Seferino Fontanez (:  1960) is a 62 y.o. female is an established patient. Patient has a history of Hypertension, DM, hyperlipidemia, cardiomyoathy, constipation vitamin d deficiency, obesity. Moderna Vaccine. done  Shingles vaccine done    HYPERTENSION/CARDIOMYOPATHY  Seferino Fontanez has a well controlled hypertension. she is currently on Entresto, coreg. She has been on this therapy for a while patient's most recent BP in the office was stable. she reports stable BP readings at home. Patient denies any adverse reaction to this therapy. she denies any CP, SOB, HA, or palpitations. Patient admits to exercising and adheres to low salt diet. No history of organ damage due to condition noted. Patient established with Dr. Seferino Farmer. Dec 2nd appointment. 2 months ago -appointment. No changes  Lab Results   Component Value Date/Time    CREATININE 0.76 2021 06:20 AM     DIABETES MELLITUS  Patient has a  stable Diabetes Mellitus. Current therapy includes Metformin. She has been on this therapy for a while we will consider using a GLP-1 to help her lose the weight next time. Patient is responding well with this therapy. Patient reports home glucose monitoring as Stable readings. Patient denieshypoglycemia episodes such as{symptoms. Patient denies neither vomiting nor diarrhea. Eye Exam: due  Foot Exam: 2021  Lab Results   Component Value Date/Time    LABA1C 6.4 2021 11:49 AM    LABA1C 6.9 2021 11:48 AM      HYPERLIPIDEMIA  Seferino Fontanez is currently on atorvastatin (Lipitor). She has been on this therapy for a while patient denies adverse reaction to this medication. Compliance with treatment thus far has been good. The patient is known to have coexisting coronary artery disease. The 10-year CVD risk score (Colin et al., 2008) is: 10.3%    Values used to calculate the score:      Age: 62 years      Sex: Female      Diabetic: Yes      Tobacco smoker: No      Systolic Blood Pressure: 550 mmHg      Is BP treated: Yes      HDL Cholesterol: 41 mg/dL      Total Cholesterol: 120 mg/dL  Lab Results   Component Value Date/Time    CHOL 117 02/28/2020 08:08 AM    HDL 41 09/27/2021 09:29 AM    LDLCHOLESTEROL 62 09/27/2021 09:29 AM    TRIG 106 02/28/2020 08:08 AM    CHOLHDLRATIO 2.9 09/27/2021 09:29 AM    VLDL NOT REPORTED 09/27/2021 09:29 AM     CONSTIPATION  Patient reported ongoing problems with constipation. Patient states that she does not have a bowel movement every day. Patient has been using some over-the-counter supplements without any success she continues to use some laxatives over-the-counter. We will trial her on some Benefiber regularly and MiraLAX as needed. VITAMIN D  Patient has a known Vitamin D Deficiency. she had a course of supplementation for 12 weeks. she is due to get levels check. We continue to discuss ways to manage this condition. We also discussed ways to improve the levels. Such as learning food groups that are high in Vitamin D. We also discuss that sun exposure is beneficial however, too much sun and sun damage can potentially result in skin cancer. Lab Results   Component Value Date/Time    VITD25 42.0 09/27/2021 09:29 AM      OBESITY  Patient's BMI is Body mass index is 37.54 kg/m². kg/m2. BMI is increasing. Patient understands that this condition increases the patient's risk for chronic conditions. Patient advised to practice healthy eating habits. Diet should include plenty of fruits, vegetables, whole grains, lean protein, and low-fat dairy. Increase water consumption. Reduce consumption of dietary sugar and sugar-sweetened beverages. Increasing physical exercises at least 3-4 times a week. We will monitor progression of condition. WEIGHT  Patient's BMI is Body mass index is 37.54 kg/m². kg/m2. BMI is increasing. Patient understands that this condition increases the patient's risk for chronic conditions. Wt Readings from Last 3 Encounters:   21 192 lb 3.2 oz (87.2 kg)   21 192 lb 4.8 oz (87.2 kg)   21 196 lb (88.9 kg)     She is due for Mammogram.   Denies breast pain, lumps or nipple discharge. She declines breast exam today. Pastor Ortega is due for Influenza vaccine. Denies side effects from prior flu shots. Denies allergy to eggs. Denies history of Guillain-Barré syndrome. Moon Magalys  is due for Tetanus Diphtheria vaccination. Patient's last dose of TD was unknown. she denies any previous adverse reaction to this type of vaccine. No data recorded      [x]Negative depression screening.    []1-4 = Minimal depression   []5-9 = Mild depression   []10-14 = Moderate depression   []15-19 = Moderately severe depression   []20-27 = Severe depression  PHQ Scores 2021   PHQ2 Score 0 0 0 0   PHQ9 Score 0 0 0 0     Counseling given: Not Answered    Patient Active Problem List   Diagnosis    Type 2 diabetes mellitus without complication, without long-term current use of insulin ([address]    Essential hypertension    Nonischemic dilated cardiomyopathy (HCC)    Mixed hyperlipidemia    Abnormal stress test    Electrocardiogram abnormal    Vitamin D deficiency    Nail fungal infection    Class 2 severe obesity due to excess calories with serious comorbidity and body mass index (BMI) of 38.0 to 38.9 in adult [facility])      Past Medical History:   Diagnosis Date    Diabetes mellitus ([address]     Hypertension       Past Surgical History:   Procedure Laterality Date     SECTION      two    NC COLON CA SCRN NOT HI RSK IND N/A 10/12/2018    COLONOSCOPY performed by Rose Mary Palencia MD at [facility] OR     Family History   Problem Relation Age of Onset    High Blood Pressure Mother    [facility] Other Mother         glaucoma    Diabetes Mother     Diabetes Sister     High Blood Pressure Sister     High Cholesterol Sister     Cancer Brother      Current Outpatient Medications   Medication Sig Dispense Refill    metFORMIN (GLUCOPHAGE) 500 MG tablet take 1 tablet by mouth three times a day 270 tablet 0    ciclopirox (PENLAC) 8 % solution Apply topically nightly. 1 Bottle 3    carvedilol (COREG) 12.5 MG tablet Take 12.5 mg by mouth 2 times daily (with meals)      sacubitril-valsartan (ENTRESTO)  MG per tablet Take 1 tablet by mouth 2 times daily      Cholecalciferol (VITAMIN D3) 50 MCG (2000 UT) CAPS Take 2,000 Units by mouth      atorvastatin (LIPITOR) 20 MG tablet Take 20 mg by mouth      aspirin 81 MG tablet Take 81 mg by mouth       No current facility-administered medications for this visit. Review of Systems   Constitutional: Positive for activity change and unexpected weight change. Negative for chills, fatigue and fever. HENT: Negative. Respiratory: Negative. Negative for cough, shortness of breath and wheezing. Cardiovascular: Negative. Negative for chest pain and palpitations. Gastrointestinal: Positive for constipation. Negative for abdominal pain. Genitourinary: Negative for dysuria. Musculoskeletal: Negative for arthralgias and myalgias. Skin: Negative for rash. Toe fungal infection   Allergic/Immunologic: Positive for environmental allergies. Neurological: Negative for light-headedness and headaches. Psychiatric/Behavioral: Positive for sleep disturbance. Negative for suicidal ideas. The patient is nervous/anxious. Physical Exam  Vitals and nursing note reviewed. Constitutional:       Appearance: She is well-developed. She is obese. HENT:      Head: Normocephalic.       Right Ear: External ear normal.      Left Ear: External ear normal.      Nose: Nose normal.      Mouth/Throat:      Mouth: Mucous membranes are moist.   Eyes:      Pupils: nightly. Concepción Gaston, APRN - CNP   carvedilol (COREG) 12.5 MG tablet Take 12.5 mg by mouth 2 times daily (with meals)  Historical Provider, MD   sacubitril-valsartan (ENTRESTO)  MG per tablet Take 1 tablet by mouth 2 times daily  Historical Provider, MD   Cholecalciferol (VITAMIN D3) 50 MCG (2000 UT) CAPS Take 2,000 Units by mouth  Historical Provider, MD   atorvastatin (LIPITOR) 20 MG tablet Take 20 mg by mouth  Historical Provider, MD   aspirin 81 MG tablet Take 81 mg by mouth  Historical Provider, MD      Social History     Tobacco Use    Smoking status: Never Smoker    Smokeless tobacco: Never Used   Substance Use Topics    Alcohol use: No     Alcohol/week: 0.0 standard drinks      There were no vitals filed for this visit. Estimated body mass index is 37.56 kg/m² as calculated from the following:    Height as of 11/1/21: 5' (1.524 m). Weight as of 11/1/21: 192 lb 4.8 oz (87.2 kg). ASSESSMENT/PLAN:  1. Nonischemic dilated cardiomyopathy (HCC)  Stable  DISCUSSED and ADVISED TO:  Discussed serious causes of CP. Advised to go to the ER for worsening CP/SOB           2. Essential hypertension  Stable  Continue current therapy. DISCUSSED AND ADVISED TO:  Cut down on your salt intake. Cut down on caffeinated drinks, sports drinks. Instructed to check BP at home regularly. Report for any chest pains, shortness of breath, headaches, and lightheadedness. Call the office if your blood pressure continue to be higher than 140/90 or 90/50. 3. Type 2 diabetes mellitus without complication, without long-term current use of insulin (HCC)  Stable  Continue therapy. We will continue to monitor Hemoglobin A1c   DISCUSSED and ADVISED TO:  Continue to check Glucose levels at home. Report increased and low levels as discussed. Decrease carbohydrates, sugary drinks, desserts in your diet. Exercise regularly, as tolerated. Try to lose weight.      -  DIABETES FOOT EXAM  - POCT glycosylated hemoglobin (Hb A1C)    4. Mixed hyperlipidemia  Stable  Continue therapy. Advised to decrease the consumption of red meats, fried foods, trans fats, sweets, sugary beverages. Advised to increase fish, vegetables, and fruits consumption. Advised to add fiber or OTC supplements in diet. Discussed weight loss which will result in improvement of lipids levels. Advised to increase daily physical activities and add regular exercises. 5. Chronic idiopathic constipation  Stable  Continue therapy. DISCUSSED and ADVISED TO:  Drink plenty of fluids, 1.5 to 2 liters a day  Increase high-fiber foods  with 25-30 g each day. These include fruits, vegetables, beans, and whole grains. Get at least 30 minutes of exercise on most days of the week. Take a fiber supplement, such as Citrucel (Methylcellulose fiber) or Metamucil (Psyllium), every day. Schedule time each day for a bowel movement. - Wheat Dextrin (BENEFIBER ON THE GO) POWD; Take 1 Package by mouth daily Mix with 8 ounce drink daily. Dispense: 100 each; Refill: 3    6. Vitamin D deficiency  Stable  Continue Vitamin D supplementation  DISCUSSED AND ADVISED TO:  Foods that contain a lot of vitamin D includes [place], tuna, and mackerel. Cheese, egg yolks, and beef liver have small amounts of vit D.  Milk, soy drinks, orange juice, yogurt, margarine, and some kinds of cereal have vitamin D added to them. Continue to use sunblock when out in the sun to prevent skin cancer. 7. Class 2 severe obesity due to excess calories with serious comorbidity and body mass index (BMI) of 38.0 to 38.9 in adult (HCC)  Stable  BMI increasing  DISCUSSED AND ADVISED TO:  Eat a low-fat and low carbohydrates diet. Avoid fried foods especially fast food. Choose healthier options for snacks. Have 5-6 servings of fruits and vegetables per day. Cut down on eating processed food. Add 30 minutes to 1 hour aerobic exercise for 3-4 days a week.         8. Encounter for comprehensive diabetic foot examination, type 2 diabetes mellitus (HCC)  Stable  No neuropathy    9. Encounter for screening mammogram for breast cancer  Recommended    - [facility] DIGITAL SCREEN BILATERAL; Future    10. Need for prophylactic vaccination against diphtheria-tetanus-pertussis (DTP)  Recommended by CDC. No current infection. Denies previous adverse reaction to vaccination.      - Tdap (ADACEL) 5-2-15.5 LF-MCG/0.5 injection; Inject 0.5 mLs into the muscle once for 1 dose  Dispense: 0.5 mL; Refill: 0    11. Need for influenza vaccination  Recommended by CDC. No current infection. Denies previous adverse reaction to vaccination.      - INFLUENZA, MDCK QUADV, 2 YRS AND OLDER, IM, PF, PREFILL SYR OR SDV, 0.5ML (FLUCELVAX QUADV, PF)    12. Class 2 obesity due to excess calories without serious comorbidity with body mass index (BMI) of 37.0 to 37.9 in adult  BMI increasing  DISCUSSED AND ADVISED TO:  Eat a low-fat and low carbohydrates diet. Avoid fried foods especially fast food. Choose healthier options for snacks. Have 5-6 servings of fruits and vegetables per day. Cut down on eating processed food. Add 30 minutes to 1 hour aerobic exercise for 3-4 days a week. This note was completed by using the assistance of a speech-recognition program. However, inadvertent computerized transcription errors may be present. Although every effort was made to ensure accuracy, no guarantees can be provided that every mistake has been identified and corrected by editing   An electronic signature was used to authenticate this note.   --MILLY Barriga - CNP on 11/18/2021 at 6:59 PM

## 2021-11-18 NOTE — PROGRESS NOTES
Visit Information    Have you changed or started any medications since your last visit including any over-the-counter medicines, vitamins, or herbal medicines? no   Have you stopped taking any of your medications? Is so, why? -  no  Are you having any side effects from any of your medications? - no    Have you seen any other physician or provider since your last visit?  no   Have you had any other diagnostic tests since your last visit?  no   Have you been seen in the emergency room and/or had an admission in a hospital since we last saw you?  no   Have you had your routine dental cleaning in the past 6 months?  no     Do you have an active MyChart account? If no, what is the barrier?   No:     Patient Care Team:  MILLY Swann CNP as PCP - General (Family Medicine)  MILLY Sullivan CNP as PCP - [address] [place] [address] Dr Denney Provider  Sammi New MD as Consulting Physician (Cardiology)    Medical History Review  Past Medical, Family, and Social History reviewed and does contribute to the patient presenting condition    Health Maintenance   Topic Date Due    DTaP/Tdap/Td vaccine (1 - Tdap) Never done    Diabetic retinal exam  02/22/2020    Diabetic foot exam  06/11/2020    Flu vaccine (1) Never done    Diabetic microalbuminuria test  09/02/2021    Breast cancer screen  09/23/2021    COVID-19 Vaccine (3 - Booster for Moderna series) 11/06/2021    A1C test (Diabetic or Prediabetic)  07/16/2022    Lipid screen  09/27/2022    Potassium monitoring  11/01/2022    Creatinine monitoring  11/01/2022    Cervical cancer screen  08/07/2025    Pneumococcal 0-64 years Vaccine (2 of 2 - PPSV23) 06/03/2026    Colon cancer screen colonoscopy  10/12/2028    Shingles Vaccine  Completed    Hepatitis C screen  Completed    HIV screen  Completed    Hepatitis A vaccine  Aged Out    Hib vaccine  Aged Out    Meningococcal (ACWY) vaccine  Aged Out

## 2021-11-19 ENCOUNTER — OFFICE VISIT (OUTPATIENT)
Dept: FAMILY MEDICINE CLINIC | Age: 61
End: 2021-11-19
Payer: COMMERCIAL

## 2021-11-19 VITALS
HEART RATE: 70 BPM | BODY MASS INDEX: 37.73 KG/M2 | SYSTOLIC BLOOD PRESSURE: 124 MMHG | OXYGEN SATURATION: 95 % | HEIGHT: 60 IN | DIASTOLIC BLOOD PRESSURE: 72 MMHG | WEIGHT: 192.2 LBS | TEMPERATURE: 95.9 F

## 2021-11-19 DIAGNOSIS — Z23 NEED FOR INFLUENZA VACCINATION: ICD-10-CM

## 2021-11-19 DIAGNOSIS — E66.09 CLASS 2 OBESITY DUE TO EXCESS CALORIES WITHOUT SERIOUS COMORBIDITY WITH BODY MASS INDEX (BMI) OF 37.0 TO 37.9 IN ADULT: ICD-10-CM

## 2021-11-19 DIAGNOSIS — E66.01 CLASS 2 SEVERE OBESITY DUE TO EXCESS CALORIES WITH SERIOUS COMORBIDITY AND BODY MASS INDEX (BMI) OF 38.0 TO 38.9 IN ADULT (HCC): ICD-10-CM

## 2021-11-19 DIAGNOSIS — E78.2 MIXED HYPERLIPIDEMIA: ICD-10-CM

## 2021-11-19 DIAGNOSIS — Z12.31 ENCOUNTER FOR SCREENING MAMMOGRAM FOR BREAST CANCER: ICD-10-CM

## 2021-11-19 DIAGNOSIS — I10 ESSENTIAL HYPERTENSION: ICD-10-CM

## 2021-11-19 DIAGNOSIS — I42.0 NONISCHEMIC DILATED CARDIOMYOPATHY (HCC): Primary | ICD-10-CM

## 2021-11-19 DIAGNOSIS — E11.9 TYPE 2 DIABETES MELLITUS WITHOUT COMPLICATION, WITHOUT LONG-TERM CURRENT USE OF INSULIN (HCC): ICD-10-CM

## 2021-11-19 DIAGNOSIS — Z23 NEED FOR PROPHYLACTIC VACCINATION AGAINST DIPHTHERIA-TETANUS-PERTUSSIS (DTP): ICD-10-CM

## 2021-11-19 DIAGNOSIS — E55.9 VITAMIN D DEFICIENCY: ICD-10-CM

## 2021-11-19 DIAGNOSIS — E11.9 ENCOUNTER FOR COMPREHENSIVE DIABETIC FOOT EXAMINATION, TYPE 2 DIABETES MELLITUS (HCC): ICD-10-CM

## 2021-11-19 DIAGNOSIS — K59.04 CHRONIC IDIOPATHIC CONSTIPATION: ICD-10-CM

## 2021-11-19 LAB — HBA1C MFR BLD: 6.4 %

## 2021-11-19 PROCEDURE — G8482 FLU IMMUNIZE ORDER/ADMIN: HCPCS | Performed by: FAMILY MEDICINE

## 2021-11-19 PROCEDURE — 3044F HG A1C LEVEL LT 7.0%: CPT | Performed by: FAMILY MEDICINE

## 2021-11-19 PROCEDURE — 2022F DILAT RTA XM EVC RTNOPTHY: CPT | Performed by: FAMILY MEDICINE

## 2021-11-19 PROCEDURE — G8417 CALC BMI ABV UP PARAM F/U: HCPCS | Performed by: FAMILY MEDICINE

## 2021-11-19 PROCEDURE — 3017F COLORECTAL CA SCREEN DOC REV: CPT | Performed by: FAMILY MEDICINE

## 2021-11-19 PROCEDURE — 1036F TOBACCO NON-USER: CPT | Performed by: FAMILY MEDICINE

## 2021-11-19 PROCEDURE — 83036 HEMOGLOBIN GLYCOSYLATED A1C: CPT | Performed by: FAMILY MEDICINE

## 2021-11-19 PROCEDURE — G8427 DOCREV CUR MEDS BY ELIG CLIN: HCPCS | Performed by: FAMILY MEDICINE

## 2021-11-19 PROCEDURE — 90674 CCIIV4 VAC NO PRSV 0.5 ML IM: CPT | Performed by: FAMILY MEDICINE

## 2021-11-19 PROCEDURE — 99214 OFFICE O/P EST MOD 30 MIN: CPT | Performed by: FAMILY MEDICINE

## 2021-11-19 PROCEDURE — 90471 IMMUNIZATION ADMIN: CPT | Performed by: FAMILY MEDICINE

## 2021-11-19 RX ORDER — WHEAT DEXTRIN 3 G/4 G
1 POWDER IN PACKET (EA) ORAL DAILY
Qty: 100 EACH | Refills: 3 | Status: SHIPPED | OUTPATIENT
Start: 2021-11-19 | End: 2021-12-19

## 2021-11-19 ASSESSMENT — ENCOUNTER SYMPTOMS
WHEEZING: 0
CONSTIPATION: 1

## 2021-11-19 ASSESSMENT — PATIENT HEALTH QUESTIONNAIRE - PHQ9
2. FEELING DOWN, DEPRESSED OR HOPELESS: 0
SUM OF ALL RESPONSES TO PHQ QUESTIONS 1-9: 0
1. LITTLE INTEREST OR PLEASURE IN DOING THINGS: 0
SUM OF ALL RESPONSES TO PHQ QUESTIONS 1-9: 0
SUM OF ALL RESPONSES TO PHQ QUESTIONS 1-9: 0
SUM OF ALL RESPONSES TO PHQ9 QUESTIONS 1 & 2: 0

## 2021-11-19 NOTE — TELEPHONE ENCOUNTER
Please Approve or Refuse.   Send to Pharmacy per Pt's Request:      Next Visit Date:  4/1/2022   Last Visit Date: 11/19/2021    Hemoglobin A1C (%)   Date Value   11/19/2021 6.4   07/16/2021 6.9   01/08/2021 6.5             ( goal A1C is < 7)   BP Readings from Last 3 Encounters:   11/19/21 124/72   11/01/21 122/72   07/16/21 106/68          (goal 120/80)  BUN   Date Value Ref Range Status   11/01/2021 21 8 - 23 mg/dL Final     CREATININE   Date Value Ref Range Status   11/01/2021 0.76 0.50 - 0.90 mg/dL Final     Potassium   Date Value Ref Range Status   11/01/2021 4.8 3.7 - 5.3 mmol/L Final

## 2021-11-19 NOTE — PATIENT INSTRUCTIONS
Patient Education        Learning About Carbohydrate (Carb) Counting and Eating Out When You Have Diabetes  Why plan your meals? Meal planning can be a key part of managing diabetes. Planning meals and snacks with the right balance of carbohydrate, protein, and fat can help you keep your blood sugar at the target level you set with your doctor. You don't have to eat special foods. You can eat what your family eats, including sweets once in a while. But you do have to pay attention to how often you eat and how much you eat of certain foods. You may want to work with a dietitian or a certified diabetes educator. He or she can give you tips and meal ideas and can answer your questions about meal planning. This health professional can also help you reach a healthy weight if that is one of your goals. What should you know about eating carbs? Managing the amount of carbohydrate (carbs) you eat is an important part of healthy meals when you have diabetes. Carbohydrate is found in many foods. · Learn which foods have carbs. And learn the amounts of carbs in different foods. ? Bread, cereal, pasta, and rice have about 15 grams of carbs in a serving. A serving is 1 slice of bread (1 ounce), ½ cup of cooked cereal, or 1/3 cup of cooked pasta or rice. ? Fruits have 15 grams of carbs in a serving. A serving is 1 small fresh fruit, such as an apple or orange; ½ of a banana; ½ cup of cooked or canned fruit; ½ cup of fruit juice; 1 cup of melon or raspberries; or 2 tablespoons of dried fruit. ? Milk and no-sugar-added yogurt have 15 grams of carbs in a serving. A serving is 1 cup of milk or 2/3 cup of no-sugar-added yogurt. ? Starchy vegetables have 15 grams of carbs in a serving. A serving is ½ cup of mashed potatoes or sweet potato; 1 cup winter squash; ½ of a small baked potato; ½ cup of cooked beans; or ½ cup cooked corn or green peas.   · Learn how much carbs to eat each day and at each meal. A dietitian or CDE can teach you how to keep track of the amount of carbs you eat. This is called carbohydrate counting. · If you are not sure how to count carbohydrate grams, use the Plate Method to plan meals. It is a good, quick way to make sure that you have a balanced meal. It also helps you spread carbs throughout the day. ? Divide your plate by types of foods. Put non-starchy vegetables on half the plate, meat or other protein food on one-quarter of the plate, and a grain or starchy vegetable in the final quarter of the plate. To this you can add a small piece of fruit and 1 cup of milk or yogurt, depending on how many carbs you are supposed to eat at a meal.  · Try to eat about the same amount of carbs at each meal. Do not \"save up\" your daily allowance of carbs to eat at one meal.  · Proteins have very little or no carbs per serving. Examples of proteins are beef, chicken, turkey, fish, eggs, tofu, cheese, cottage cheese, and peanut butter. A serving size of meat is 3 ounces, which is about the size of a deck of cards. Examples of meat substitute serving sizes (equal to 1 ounce of meat) are 1/4 cup of cottage cheese, 1 egg, 1 tablespoon of peanut butter, and ½ cup of tofu. How can you eat out and still eat healthy? · Learn to estimate the serving sizes of foods that have carbohydrate. If you measure food at home, it will be easier to estimate the amount in a serving of restaurant food. · If the meal you order has too much carbohydrate (such as potatoes, corn, or baked beans), ask to have a low-carbohydrate food instead. Ask for a salad or green vegetables. · If you use insulin, check your blood sugar before and after eating out to help you plan how much to eat in the future. · If you eat more carbohydrate at a meal than you had planned, take a walk or do other exercise. This will help lower your blood sugar. What are some tips for eating healthy? · Limit saturated fat, such as the fat from meat and dairy products.  This is a healthy choice because people who have diabetes are at higher risk of heart disease. So choose lean cuts of meat and nonfat or low-fat dairy products. Use olive or canola oil instead of butter or shortening when cooking. · Don't skip meals. Your blood sugar may drop too low if you skip meals and take insulin or certain medicines for diabetes. · Check with your doctor before you drink alcohol. Alcohol can cause your blood sugar to drop too low. Alcohol can also cause a bad reaction if you take certain diabetes medicines. Follow-up care is a key part of your treatment and safety. Be sure to make and go to all appointments, and call your doctor if you are having problems. It's also a good idea to know your test results and keep a list of the medicines you take. Where can you learn more? Go to https://Fetchnotesperahuleb.Ultora. org and sign in to your [employer] account. Enter Z519 in the Go Dish box to learn more about \"Learning About Carbohydrate (Carb) Counting and Eating Out When You Have Diabetes. \"     If you do not have an account, please click on the \"Sign Up Now\" link. Current as of: December 17, 2020               Content Version: 13.0  © 0502-2442 Healthwise, Incorporated. Care instructions adapted under license by [facility] ([facility] If you have questions about a medical condition or this instruction, always ask your healthcare professional. [place] [address] any warranty or liability for your use of this information.

## 2021-12-02 ENCOUNTER — TELEPHONE (OUTPATIENT)
Dept: FAMILY MEDICINE CLINIC | Age: 61
End: 2021-12-02

## 2021-12-02 NOTE — TELEPHONE ENCOUNTER
Dr. Amber Carmichael office called stating he wants your input about starting patient on Farxiga 10mg. Please advise.

## 2022-03-29 ASSESSMENT — ENCOUNTER SYMPTOMS
RESPIRATORY NEGATIVE: 1
SHORTNESS OF BREATH: 0
CONSTIPATION: 1
ABDOMINAL PAIN: 0
WHEEZING: 0
COUGH: 0

## 2022-03-30 NOTE — PROGRESS NOTES
P PHYSICIANS  [facility]  [address]  [address] [address]  Dept: [phone]     2022   Chief Complaint   Patient presents with    Hypertension     4 month follow up     Diabetes       Mabel Radford (:  1960) is a 62 y.o. female is an established patient. Patient has a history of Hypertension, DM, hyperlipidemia, cardiomyoathy, constipation vitamin d deficiency,and  obesity. HYPERTENSION/CARDIOMYOPATHY  Mabel Radford has a well controlled hypertension also cardiomyopathy. she is currently on Entresto, and Coreg. She has been on this therapy for a while patient's most recent BP in the office was stable. she reports stable BP readings at home. Patient denies any adverse reaction to this therapy. she denies any CP, SOB, HA, or palpitations. Patient admits to exercising and adheres to low salt diet. No history of organ damage due to condition noted. Patient established with Dr. Stephanie Quiñones. appointment. 2 months ago -appointment. No changes  Lab Results   Component Value Date/Time    CREATININE 0.76 2021 06:20 AM     DIABETES MELLITUS- A1c went up. No changes  Patient has a  stable Diabetes Mellitus. Current therapy includes Metformin. She has been on this therapy for a while we will consider using a GLP-1 to help her lose the weight next time. Patient is responding well with this therapy. Patient reports home glucose monitoring as Stable readings. Patient denieshypoglycemia episodes such as{symptoms. Patient denies neither vomiting nor diarrhea. Eye Exam: due  Foot Exam: 2021  Lab Results   Component Value Date/Time    LABA1C 6.7 2022 03:30 PM    LABA1C 6.4 2021 11:49 AM      HYPERLIPIDEMIA  Mabel Radford is currently on atorvastatin (Lipitor). She has been on this therapy for a while patient denies adverse reaction to this medication. Compliance with treatment thus far has been good.  The patient is known to have coexisting coronary artery disease. The 10-year CVD risk score (Colin, et al., 2008) is: 10.3%    Values used to calculate the score:      Age: 62 years      Sex: Female      Diabetic: Yes      Tobacco smoker: No      Systolic Blood Pressure: 683 mmHg      Is BP treated: Yes      HDL Cholesterol: 41 mg/dL      Total Cholesterol: 120 mg/dL  Lab Results   Component Value Date/Time    CHOL 117 02/28/2020 08:08 AM    HDL 41 09/27/2021 09:29 AM    LDLCHOLESTEROL 62 09/27/2021 09:29 AM    TRIG 106 02/28/2020 08:08 AM    CHOLHDLRATIO 2.9 09/27/2021 09:29 AM    VLDL NOT REPORTED 09/27/2021 09:29 AM     CONSTIPATION  Patient reported ongoing intermittent problems with constipation. Patient states that she does not have a bowel movement every day. Patient has been using some over-the-counter supplements without any success she continues to use some laxatives over-the-counter. We will trial her on some Benefiber regularly and MiraLAX as needed. Insurance did not cover. VITAMIN D  Patient has a known Vitamin D Deficiency. she had a course of supplementation for 12 weeks. takes daily supplementation now she is due to get levels check. We continue to discuss ways to manage this condition. We also discussed ways to improve the levels. Such as learning food groups that are high in Vitamin D. We also discuss that sun exposure is beneficial however, too much sun and sun damage can potentially result in skin cancer. Lab Results   Component Value Date/Time    VITD25 42.0 09/27/2021 09:29 AM      WEIGHT  Patient's BMI is Body mass index is 37.38 kg/m². kg/m2. BMI is increasing. Patient understands that this condition increases the patient's risk for chronic conditions. Wt Readings from Last 3 Encounters:   04/01/22 191 lb 6.4 oz (86.8 kg)   11/19/21 192 lb 3.2 oz (87.2 kg)   11/01/21 192 lb 4.8 oz (87.2 kg)     PHQ-9 Total Score: 0 (4/1/2022  3:22 PM)     [x]Negative depression screening.    []1-4 = Minimal depression   []5-9 = Mild depression   []10-14 = Moderate depression   []15-19 = Moderately severe depression   []20-27 = Severe depression  PHQ Scores 2021   PHQ2 Score 0 0 0 0 0 0   PHQ9 Score 0 0 0 0 0 0     Madalyn Longoria  is due for Tetanus Diphtheria vaccination. Patient's last dose of TD was unknown. she denies any previous adverse reaction to this type of vaccine.      Counseling given: Not Answered    Patient Active Problem List   Diagnosis    Type 2 diabetes mellitus without complication, without long-term current use of insulin ([facility] [address]    Essential hypertension    Nonischemic dilated cardiomyopathy (HCC)    Mixed hyperlipidemia    Abnormal stress test    Electrocardiogram abnormal    Vitamin D deficiency    Nail fungal infection    Class 2 severe obesity due to excess calories with serious comorbidity and body mass index (BMI) of 38.0 to 38.9 in adult [facility])    Chronic idiopathic constipation      Past Medical History:   Diagnosis Date    Diabetes mellitus ([facility] [address]     Hypertension       Past Surgical History:   Procedure Laterality Date     SECTION      two    MT COLON CA SCRN NOT HI RSK IND N/A 10/12/2018    COLONOSCOPY performed by Tong Pierce MD at [id number]     Family History   Problem Relation Age of Onset    High Blood Pressure Mother     Other Mother         glaucoma    Diabetes Mother     Diabetes Sister     High Blood Pressure Sister     High Cholesterol Sister     Cancer Brother      Current Outpatient Medications   Medication Sig Dispense Refill    metFORMIN (GLUCOPHAGE) 500 MG tablet take 1 tablet by mouth three times a day 270 tablet 3    ciclopirox (PENLAC) 8 % solution apply topically to affected area nightly 6.6 mL 2    carvedilol (COREG) 12.5 MG tablet Take 12.5 mg by mouth 2 times daily (with meals)      sacubitril-valsartan (ENTRESTO)  MG per tablet Take 1 tablet by mouth 2 times daily      Cholecalciferol (VITAMIN D3) 50 MCG (2000 UT) CAPS Take 2,000 Units by mouth      atorvastatin (LIPITOR) 20 MG tablet Take 20 mg by mouth      aspirin 81 MG tablet Take 81 mg by mouth       No current facility-administered medications for this visit. Review of Systems   Constitutional: Positive for activity change and unexpected weight change. Negative for chills, fatigue and fever. HENT: Negative. Respiratory: Negative. Negative for cough, shortness of breath and wheezing. Cardiovascular: Negative. Negative for chest pain and palpitations. Gastrointestinal: Positive for constipation. Negative for abdominal pain. Musculoskeletal: Negative for arthralgias and myalgias. Skin: Negative for rash. Toe fungal infection   Allergic/Immunologic: Negative for environmental allergies. Neurological: Negative for light-headedness and headaches. Psychiatric/Behavioral: Positive for sleep disturbance. Negative for suicidal ideas. The patient is nervous/anxious. Physical Exam  Vitals and nursing note reviewed. Constitutional:       Appearance: She is well-developed. She is obese. HENT:      Head: Normocephalic. Right Ear: External ear normal.      Left Ear: External ear normal.      Nose: Nose normal.      Mouth/Throat:      Mouth: Mucous membranes are moist.   Eyes:      Pupils: Pupils are equal, round, and reactive to light. Cardiovascular:      Rate and Rhythm: Normal rate and regular rhythm. Pulses: Normal pulses. Heart sounds: Normal heart sounds. Pulmonary:      Effort: Pulmonary effort is normal.      Breath sounds: Normal breath sounds. Abdominal:      General: Abdomen is protuberant. Bowel sounds are normal. There is no distension. Palpations: Abdomen is soft. Comments: Obese   Musculoskeletal:         General: Normal range of motion. Cervical back: Normal range of motion and neck supple. Right foot: Normal range of motion. No deformity.       Left foot: Normal range of motion. No deformity. Feet:      Right foot:      Protective Sensation: 10 sites tested. 9 sites sensed. Skin integrity: Callus and dry skin present. No ulcer, blister, skin breakdown, erythema or warmth. Toenail Condition: Right toenails are abnormally thick. Fungal disease present. Left foot:      Protective Sensation: 10 sites tested. 9 sites sensed. Skin integrity: Callus and dry skin present. No ulcer, blister, skin breakdown, erythema or warmth. Toenail Condition: Left toenails are abnormally thick. Fungal disease present. Skin:     General: Skin is warm and dry. Capillary Refill: Capillary refill takes less than 2 seconds. Neurological:      Mental Status: She is alert and oriented to person, place, and time. Psychiatric:         Mood and Affect: Mood is anxious. Speech: Speech is rapid and pressured. Behavior: Behavior normal.         Thought Content: Thought content does not include suicidal ideation. Prior to Visit Medications    Medication Sig Taking?  Authorizing Provider   metFORMIN (GLUCOPHAGE) 500 MG tablet take 1 tablet by mouth three times a day Yes MILLY Swann CNP   ciclopirox (PENLAC) 8 % solution apply topically to affected area nightly Yes MILLY Swann CNP   carvedilol (COREG) 12.5 MG tablet Take 12.5 mg by mouth 2 times daily (with meals) Yes Historical Provider, MD   sacubitril-valsartan (ENTRESTO)  MG per tablet Take 1 tablet by mouth 2 times daily Yes Historical Provider, MD   Cholecalciferol (VITAMIN D3) 50 MCG (2000 UT) CAPS Take 2,000 Units by mouth Yes Historical Provider, MD   atorvastatin (LIPITOR) 20 MG tablet Take 20 mg by mouth Yes Historical Provider, MD   aspirin 81 MG tablet Take 81 mg by mouth Yes Historical Provider, MD      Social History     Tobacco Use    Smoking status: Never Smoker    Smokeless tobacco: Never Used   Substance Use Topics    Alcohol use: No     Alcohol/week: 0.0 standard drinks      Vitals:    04/01/22 1519   BP: 122/78   Pulse: 73   Temp: 97.4 °F (36.3 °C)   SpO2: 97%   Weight: 191 lb 6.4 oz (86.8 kg)   Height: 5' (1.524 m)     Estimated body mass index is 37.38 kg/m² as calculated from the following:    Height as of this encounter: 5' (1.524 m). Weight as of this encounter: 191 lb 6.4 oz (86.8 kg). ASSESSMENT/PLAN:  1. Nonischemic dilated cardiomyopathy (HCC)  Stable  DISCUSSED and ADVISED TO:  Discussed serious causes of CP. Advised to go to the ER for worsening CP/SOB           2. Essential hypertension  Stable  Continue current therapy. DISCUSSED AND ADVISED TO:  Cut down on your salt intake. Cut down on caffeinated drinks, sports drinks. Instructed to check BP at home regularly. Report for any chest pains, shortness of breath, headaches, and lightheadedness. Call the office if your blood pressure continue to be higher than 140/90 or 90/50. 3. Type 2 diabetes mellitus without complication, without long-term current use of insulin (HCC)  Stable  Continue therapy. We will continue to monitor Hemoglobin A1c   DISCUSSED and ADVISED TO:  Continue to check Glucose levels at home. Report increased and low levels as discussed. Decrease carbohydrates, sugary drinks, desserts in your diet. Exercise regularly, as tolerated. Try to lose weight.      - POCT glycosylated hemoglobin (Hb A1C); Future  - POCT glycosylated hemoglobin (Hb A1C)    4. Mixed hyperlipidemia  Stable  Continue therapy. Advised to decrease the consumption of red meats, fried foods, trans fats, sweets, sugary beverages. Advised to increase fish, vegetables, and fruits consumption. Advised to add fiber or OTC supplements in diet. Discussed weight loss which will result in improvement of lipids levels. Advised to increase daily physical activities and add regular exercises. 5. Chronic idiopathic constipation  Failure to Improve  Continue therapy.   DISCUSSED and ADVISED TO:  Drink plenty of fluids, 1.5 to 2 liters a day  Increase high-fiber foods  with 25-30 g each day. These include fruits, vegetables, beans, and whole grains. Get at least 30 minutes of exercise on most days of the week. Take a fiber supplement, such as Citrucel (Methylcellulose fiber) or Metamucil (Psyllium), every day. Schedule time each day for a bowel movement. 6. Vitamin D deficiency  Stable  Continue Vitamin D supplementation  DISCUSSED AND ADVISED TO:  Foods that contain a lot of vitamin D includes [place], tuna, and mackerel. Cheese, egg yolks, and beef liver have small amounts of vit D.  Milk, soy drinks, orange juice, yogurt, margarine, and some kinds of cereal have vitamin D added to them. Continue to use sunblock when out in the sun to prevent skin cancer. 7. Class 2 severe obesity due to excess calories with serious comorbidity and body mass index (BMI) of 38.0 to 38.9 in adult [facility])  Failure to Improve  BMI increasing  DISCUSSED AND ADVISED TO:  Eat a low-fat and low carbohydrates diet. Avoid fried foods especially fast food. Choose healthier options for snacks. Have 5-6 servings of fruits and vegetables per day. Cut down on eating processed food. Add 30 minutes to 1 hour aerobic exercise for 3-4 days a week. 8. Need for Tdap vaccination  Recommended by CDC. No current infection. Denies previous adverse reaction to vaccination.      - Tdap (age 6y and older) IM (Boostrix)        This note was completed by using the assistance of a speech-recognition program. However, inadvertent computerized transcription errors may be present. Although every effort was made to ensure accuracy, no guarantees can be provided that every mistake has been identified and corrected by editing   An electronic signature was used to authenticate this note.   --Goldy Restrepo, APRN - CNP on 4/1/2022 at 6:18 PM

## 2022-04-01 ENCOUNTER — OFFICE VISIT (OUTPATIENT)
Dept: FAMILY MEDICINE CLINIC | Age: 62
End: 2022-04-01
Payer: COMMERCIAL

## 2022-04-01 VITALS
OXYGEN SATURATION: 97 % | DIASTOLIC BLOOD PRESSURE: 78 MMHG | HEIGHT: 60 IN | TEMPERATURE: 97.4 F | WEIGHT: 191.4 LBS | HEART RATE: 73 BPM | BODY MASS INDEX: 37.58 KG/M2 | SYSTOLIC BLOOD PRESSURE: 122 MMHG

## 2022-04-01 DIAGNOSIS — K59.04 CHRONIC IDIOPATHIC CONSTIPATION: ICD-10-CM

## 2022-04-01 DIAGNOSIS — E11.9 TYPE 2 DIABETES MELLITUS WITHOUT COMPLICATION, WITHOUT LONG-TERM CURRENT USE OF INSULIN (HCC): ICD-10-CM

## 2022-04-01 DIAGNOSIS — E55.9 VITAMIN D DEFICIENCY: ICD-10-CM

## 2022-04-01 DIAGNOSIS — I10 ESSENTIAL HYPERTENSION: ICD-10-CM

## 2022-04-01 DIAGNOSIS — Z23 NEED FOR TDAP VACCINATION: ICD-10-CM

## 2022-04-01 DIAGNOSIS — E66.01 CLASS 2 SEVERE OBESITY DUE TO EXCESS CALORIES WITH SERIOUS COMORBIDITY AND BODY MASS INDEX (BMI) OF 38.0 TO 38.9 IN ADULT (HCC): ICD-10-CM

## 2022-04-01 DIAGNOSIS — I42.0 NONISCHEMIC DILATED CARDIOMYOPATHY (HCC): Primary | ICD-10-CM

## 2022-04-01 DIAGNOSIS — E78.2 MIXED HYPERLIPIDEMIA: ICD-10-CM

## 2022-04-01 LAB — HBA1C MFR BLD: 6.7 %

## 2022-04-01 PROCEDURE — 99214 OFFICE O/P EST MOD 30 MIN: CPT | Performed by: FAMILY MEDICINE

## 2022-04-01 PROCEDURE — 90715 TDAP VACCINE 7 YRS/> IM: CPT | Performed by: FAMILY MEDICINE

## 2022-04-01 PROCEDURE — 83036 HEMOGLOBIN GLYCOSYLATED A1C: CPT | Performed by: FAMILY MEDICINE

## 2022-04-01 PROCEDURE — G8427 DOCREV CUR MEDS BY ELIG CLIN: HCPCS | Performed by: FAMILY MEDICINE

## 2022-04-01 PROCEDURE — 90471 IMMUNIZATION ADMIN: CPT | Performed by: FAMILY MEDICINE

## 2022-04-01 PROCEDURE — 2022F DILAT RTA XM EVC RTNOPTHY: CPT | Performed by: FAMILY MEDICINE

## 2022-04-01 PROCEDURE — 1036F TOBACCO NON-USER: CPT | Performed by: FAMILY MEDICINE

## 2022-04-01 PROCEDURE — 3017F COLORECTAL CA SCREEN DOC REV: CPT | Performed by: FAMILY MEDICINE

## 2022-04-01 PROCEDURE — G8417 CALC BMI ABV UP PARAM F/U: HCPCS | Performed by: FAMILY MEDICINE

## 2022-04-01 PROCEDURE — 3044F HG A1C LEVEL LT 7.0%: CPT | Performed by: FAMILY MEDICINE

## 2022-04-01 ASSESSMENT — PATIENT HEALTH QUESTIONNAIRE - PHQ9
1. LITTLE INTEREST OR PLEASURE IN DOING THINGS: 0
SUM OF ALL RESPONSES TO PHQ QUESTIONS 1-9: 0
SUM OF ALL RESPONSES TO PHQ9 QUESTIONS 1 & 2: 0
2. FEELING DOWN, DEPRESSED OR HOPELESS: 0

## 2022-04-01 NOTE — PROGRESS NOTES
Visit Information    Have you changed or started any medications since your last visit including any over-the-counter medicines, vitamins, or herbal medicines? no   Have you stopped taking any of your medications? Is so, why? -  no  Are you having any side effects from any of your medications? - no    Have you seen any other physician or provider since your last visit?  no   Have you had any other diagnostic tests since your last visit?  no   Have you been seen in the emergency room and/or had an admission in a hospital since we last saw you?  no   Have you had your routine dental cleaning in the past 6 months?  no     Do you have an active MyChart account? If no, what is the barrier?   No: not interested in signing up    Patient Care Team:  MILLY Chan CNP as PCP - General (Family Medicine)  MILLY Chan CNP as PCP - [facility] Provider  Paulino Martell MD as Consulting Physician (Cardiology)    Medical History Review  Past Medical, Family, and Social History reviewed and does contribute to the patient presenting condition    Health Maintenance   Topic Date Due    DTaP/Tdap/Td vaccine (1 - Tdap) Never done    Diabetic retinal exam  02/22/2020    Diabetic microalbuminuria test  09/02/2021    Breast cancer screen  09/23/2021    COVID-19 Vaccine (3 - Booster for Yas Monetta series) 10/06/2021    Lipid screen  09/27/2022    Potassium monitoring  11/01/2022    Creatinine monitoring  11/01/2022    Diabetic foot exam  11/19/2022    A1C test (Diabetic or Prediabetic)  11/19/2022    Depression Screen  11/19/2022    Cervical cancer screen  08/07/2025    Pneumococcal 0-64 years Vaccine (2 of 2 - PPSV23) 06/03/2026    Colorectal Cancer Screen  10/12/2028    Flu vaccine  Completed    Shingles Vaccine  Completed    Hepatitis C screen  Completed    HIV screen  Completed    Hepatitis A vaccine  Aged Out    Hib vaccine  Aged Out    Meningococcal (ACWY) vaccine  Aged Out

## 2022-04-01 NOTE — PATIENT INSTRUCTIONS
Patient Education        Learning About Carbohydrate (Carb) Counting and Eating Out When You Have Diabetes  Why plan your meals? Meal planning can be a key part of managing diabetes. Planning meals and snacks with the right balance of carbohydrate, protein, and fat can help you keep yourblood sugar at the target level you set with your doctor. You don't have to eat special foods. You can eat what your family eats, including sweets once in a while. But you do have to pay attention to how oftenyou eat and how much you eat of certain foods. You may want to work with a dietitian or a diabetes educator. They can give you tips and meal ideas and can answer your questions about meal planning. This health professional can also help you reach a healthy weight if that is one ofyour goals. What should you know about eating carbs? Managing the amount of carbohydrate (carbs) you eat is an important part ofhealthy meals when you have diabetes. Carbohydrate is found in many foods.  Learn which foods have carbs. And learn the amounts of carbs in different foods. ? Bread, cereal, pasta, and rice have about 15 grams of carbs in a serving. A serving is 1 slice of bread (1 ounce), ½ cup of cooked cereal, or 1/3 cup of cooked pasta or rice. ? Fruits have 15 grams of carbs in a serving. A serving is 1 small fresh fruit, such as an apple or orange; ½ of a banana; ½ cup of cooked or canned fruit; ½ cup of fruit juice; 1 cup of melon or raspberries; or 2 tablespoons of dried fruit. ? Milk and no-sugar-added yogurt have 15 grams of carbs in a serving. A serving is 1 cup of milk or 3/4 cup (6 oz) of no-sugar-added yogurt. ? Starchy vegetables have 15 grams of carbs in a serving. A serving is ½ cup of mashed potatoes or sweet potato; 1 cup winter squash; ½ of a small baked potato; ½ cup of cooked beans; or ½ cup cooked corn or green peas.    Learn how much carbs to eat each day and at each meal. A dietitian or certified diabetes educator can teach you how to keep track of the amount of carbs you eat. This is called carbohydrate counting.  If you are not sure how to count carbohydrate grams, use the plate method to plan meals. It is a quick way to make sure that you have a balanced meal. It also can help you manage the amount of carbohydrate you eat at meals. ? Divide your plate by types of foods. Put non-starchy vegetables on half the plate, meat or other protein food on one-quarter of the plate, and a grain or starchy vegetable in the final quarter of the plate. To this you can add a small piece of fruit and 1 cup of milk or yogurt, depending on how many carbs you are supposed to eat at a meal.   Try to eat about the same amount of carbs at each meal. Do not \"save up\" your daily allowance of carbs to eat at one meal.   Proteins have very little or no carbs. Examples of proteins are beef, chicken, turkey, fish, eggs, tofu, cheese, cottage cheese, and peanut butter. How can you eat out and still eat healthy?  Learn to estimate the serving sizes of foods that have carbohydrate. If you measure food at home, it will be easier to estimate the amount in a serving of restaurant food.  If the meal you order has too much carbohydrate (such as potatoes, corn, or baked beans), ask to have a low-carbohydrate food instead. Ask for a salad or non-starchy vegetables like broccoli, cauliflower, green beans, or peppers.  If you eat more carbohydrate at a meal than you had planned, take a walk or do other exercise. This will help lower your blood sugar. What are some tips for eating healthy?  Limit saturated fat, such as the fat from meat and dairy products. This is a healthy choice because people who have diabetes are at higher risk of heart disease. So choose lean cuts of meat and nonfat or low-fat dairy products. Use olive or canola oil instead of butter or shortening when cooking.  Don't skip meals.  Your blood sugar may drop too low if you skip meals and take insulin or certain medicines for diabetes.  Check with your doctor before you drink alcohol. Alcohol can cause your blood sugar to drop too low. Alcohol can also cause a bad reaction if you take certain diabetes medicines. Follow-up care is a key part of your treatment and safety. Be sure to make and go to all appointments, and call your doctor if you are having problems. It's also a good idea to know your test results and keep alist of the medicines you take. Where can you learn more? Go to [email or web address] org and sign in to your [employer] account. Enter X540 in the [employer] box to learn more about \"Learning About Carbohydrate (Carb) Counting and Eating Out When You Have Diabetes. \"     If you do not have an account, please click on the \"Sign Up Now\" link. Current as of: September 8, 2021               Content Version: 13.2  © 5261-0373 Healthwise, Incorporated. Care instructions adapted under license by [facility] ([facility]). If you have questions about a medical condition or this instruction, always ask your healthcare professional. [place] [address] any warranty or liability for your use of this information.

## 2022-05-27 ENCOUNTER — HOSPITAL ENCOUNTER (OUTPATIENT)
Dept: WOMENS IMAGING | Age: 62
Discharge: HOME | End: 2022-05-29
Payer: COMMERCIAL

## 2022-05-27 DIAGNOSIS — Z12.31 VISIT FOR SCREENING MAMMOGRAM: ICD-10-CM

## 2022-05-27 PROCEDURE — 77063 BREAST TOMOSYNTHESIS BI: CPT

## 2023-01-12 ENCOUNTER — OFFICE VISIT (OUTPATIENT)
Dept: FAMILY MEDICINE CLINIC | Age: 63
End: 2023-01-12
Payer: COMMERCIAL

## 2023-01-12 VITALS
DIASTOLIC BLOOD PRESSURE: 80 MMHG | BODY MASS INDEX: 37.11 KG/M2 | TEMPERATURE: 97.5 F | OXYGEN SATURATION: 96 % | SYSTOLIC BLOOD PRESSURE: 118 MMHG | WEIGHT: 189 LBS | HEIGHT: 60 IN | HEART RATE: 71 BPM

## 2023-01-12 DIAGNOSIS — E11.9 TYPE 2 DIABETES MELLITUS WITHOUT COMPLICATION, WITHOUT LONG-TERM CURRENT USE OF INSULIN (HCC): Primary | ICD-10-CM

## 2023-01-12 DIAGNOSIS — E66.01 CLASS 2 SEVERE OBESITY DUE TO EXCESS CALORIES WITH SERIOUS COMORBIDITY AND BODY MASS INDEX (BMI) OF 38.0 TO 38.9 IN ADULT (HCC): ICD-10-CM

## 2023-01-12 DIAGNOSIS — K59.04 CHRONIC IDIOPATHIC CONSTIPATION: ICD-10-CM

## 2023-01-12 DIAGNOSIS — E78.2 MIXED HYPERLIPIDEMIA: ICD-10-CM

## 2023-01-12 DIAGNOSIS — I10 ESSENTIAL HYPERTENSION: ICD-10-CM

## 2023-01-12 DIAGNOSIS — R94.31 ELECTROCARDIOGRAM ABNORMAL: ICD-10-CM

## 2023-01-12 DIAGNOSIS — I42.0 NONISCHEMIC DILATED CARDIOMYOPATHY (HCC): ICD-10-CM

## 2023-01-12 DIAGNOSIS — E55.9 VITAMIN D DEFICIENCY: ICD-10-CM

## 2023-01-12 LAB — HBA1C MFR BLD: 6.9 %

## 2023-01-12 PROCEDURE — 99214 OFFICE O/P EST MOD 30 MIN: CPT | Performed by: FAMILY MEDICINE

## 2023-01-12 PROCEDURE — 3044F HG A1C LEVEL LT 7.0%: CPT | Performed by: FAMILY MEDICINE

## 2023-01-12 PROCEDURE — 3079F DIAST BP 80-89 MM HG: CPT | Performed by: FAMILY MEDICINE

## 2023-01-12 PROCEDURE — 3074F SYST BP LT 130 MM HG: CPT | Performed by: FAMILY MEDICINE

## 2023-01-12 PROCEDURE — 83036 HEMOGLOBIN GLYCOSYLATED A1C: CPT | Performed by: FAMILY MEDICINE

## 2023-01-12 SDOH — ECONOMIC STABILITY: FOOD INSECURITY: WITHIN THE PAST 12 MONTHS, YOU WORRIED THAT YOUR FOOD WOULD RUN OUT BEFORE YOU GOT MONEY TO BUY MORE.: NEVER TRUE

## 2023-01-12 SDOH — ECONOMIC STABILITY: FOOD INSECURITY: WITHIN THE PAST 12 MONTHS, THE FOOD YOU BOUGHT JUST DIDN'T LAST AND YOU DIDN'T HAVE MONEY TO GET MORE.: NEVER TRUE

## 2023-01-12 ASSESSMENT — ENCOUNTER SYMPTOMS
CONSTIPATION: 0
SORE THROAT: 0
SINUS PRESSURE: 0
NAUSEA: 0
BACK PAIN: 1
DIARRHEA: 0
RECTAL PAIN: 0
ABDOMINAL PAIN: 0
WHEEZING: 0
STRIDOR: 0
RHINORRHEA: 0
SHORTNESS OF BREATH: 1
VOMITING: 0
COUGH: 0
TROUBLE SWALLOWING: 0
CHEST TIGHTNESS: 1
ABDOMINAL DISTENTION: 0
BLOOD IN STOOL: 0
COLOR CHANGE: 0
EYE REDNESS: 0

## 2023-01-12 ASSESSMENT — PATIENT HEALTH QUESTIONNAIRE - PHQ9
2. FEELING DOWN, DEPRESSED OR HOPELESS: 0
SUM OF ALL RESPONSES TO PHQ QUESTIONS 1-9: 0
SUM OF ALL RESPONSES TO PHQ9 QUESTIONS 1 & 2: 0
SUM OF ALL RESPONSES TO PHQ QUESTIONS 1-9: 0
SUM OF ALL RESPONSES TO PHQ QUESTIONS 1-9: 0
1. LITTLE INTEREST OR PLEASURE IN DOING THINGS: 0
SUM OF ALL RESPONSES TO PHQ QUESTIONS 1-9: 0

## 2023-01-12 ASSESSMENT — SOCIAL DETERMINANTS OF HEALTH (SDOH): HOW HARD IS IT FOR YOU TO PAY FOR THE VERY BASICS LIKE FOOD, HOUSING, MEDICAL CARE, AND HEATING?: NOT HARD AT ALL

## 2023-01-12 NOTE — PATIENT INSTRUCTIONS
New Updates for Radha Yang/ [place] ([facility]) GIOVANNI    Thank you for choosing US to give you the best care! [facility] ([facility]) is always trying to think of new ways to help their patients. We are asking all patients to try out the new digital registration that is now available through your [facility] account or the new GIOVANNI, [place] ([facility]). Via the giovanni you're now able to update your personal and registration information prior to your upcoming appointment. This will save you time once you arrive at the office to check-in, not to mention your information remains safe!! Many other perks come from signing up for an account, such as:  Requesting refills  Scheduling an appointment  Completing an E-Visit  Sending a message to the office/provider  Having access to your medication list  Paying your bill/copay prior to your appointment  Scheduling your yearly mammogram  Review your test results    If you are not familiar with [facility] or the [email or web address]) GIOVANNI, please ask one of us and we will be happy to answer any questions or help you set-up your account.       Your [facility]ha Farrell office,  [place]

## 2023-01-12 NOTE — PROGRESS NOTES
Visit Information    Have you changed or started any medications since your last visit including any over-the-counter medicines, vitamins, or herbal medicines? no   Have you stopped taking any of your medications? Is so, why? -  no  Are you having any side effects from any of your medications? - no    Have you seen any other physician or provider since your last visit? yes - Dr. Edgar Nava     Have you had any other diagnostic tests since your last visit? yes - ECHO 1/11/2023   Have you been seen in the emergency room and/or had an admission in a hospital since we last saw you?  no   Have you had your routine dental cleaning in the past 6 months?  no     Do you have an active MyChart account? If no, what is the barrier?   No    Patient Care Team:  John Mccurdy MD as PCP - General (Family Medicine)  MILLY Salas CNP as PCP - [facility]  Jacquelyn Mukherjee MD as Consulting Physician (Cardiology)    Medical History Review  Past Medical, Family, and Social History reviewed and does contribute to the patient presenting condition    Health Maintenance   Topic Date Due    Diabetic retinal exam  02/22/2020    COVID-19 Vaccine (3 - Booster for Olinda Erik series) 07/01/2021    Diabetic Alb to Cr ratio (uACR) test  09/02/2021    Lipids  09/27/2022    GFR test (Diabetes, CKD 3-4, OR last GFR 15-59)  11/01/2022    Diabetic foot exam  11/19/2022    Flu vaccine (1) 06/30/2023 (Originally 8/1/2022)    A1C test (Diabetic or Prediabetic)  04/01/2023    Depression Screen  04/01/2023    Breast cancer screen  05/27/2024    Cervical cancer screen  08/07/2025    Colorectal Cancer Screen  10/12/2028    DTaP/Tdap/Td vaccine (2 - Td or Tdap) 04/01/2032    Shingles vaccine  Completed    Pneumococcal 0-64 years Vaccine  Completed    Hepatitis C screen  Completed    HIV screen  Completed    Hepatitis A vaccine  Aged Out    Hib vaccine  Aged Out    Meningococcal (ACWY) vaccine  Aged Out

## 2023-01-12 NOTE — PROGRESS NOTES
Chief Complaint   Patient presents with    Establish Care    Flu Vaccine     Decline flu vaccine     Diabetes         January Mckenna  here today for follow up on chronic medical problems, go over labs and/or diagnostic studies, and medication refills. Establish Care, Flu Vaccine (Decline flu vaccine ), and Diabetes      HPI: Patient is scheduled for follow-up on chronic medical conditions and to establish. Not seen since last 1 year. History of diabetes controlled A1c has mildly increased to 6.9 from 6.7, patient is currently on metformin 1500 mg/day. Patient reports she takes 3 times a day, denies any side effects able to tolerate. Patient monitors her blood sugars at home and are usually between 1 50-1 60. Patient is due for foot exam.    Hypertension controlled, patient follows with cardiologist Dr. Monika Love. Patient has history of nonischemic cardiomyopathy, had recent echocardiogram done that showed possible PFO. She had ASHER done yesterday, which is normal.  That did not show any PFO. Patient has ejection fraction 30% on previous echocardiogram.      Multiple bubble studies performed. No right to left interatrial   shunting. No clear color flow across interatrial septum. Prominent   venous inflow on TTE study. Eustachian valve may direct flow towards IAS. Normal pulmonary venous flow. Normal RA and RV size. No significant valvular stenosis or regurgitation is noted. Hyperlipidemia stable on statins, patient has no recent blood work. The 10-year CVD risk score (D'Agostino, et al., 2008) is: 9.8%    Values used to calculate the score:      Age: 65 years      Sex: Female      Diabetic: Yes      Tobacco smoker: No      Systolic Blood Pressure: 329 mmHg      Is BP treated: Yes      HDL Cholesterol: 41 mg/dL      Total Cholesterol: 120 mg/dL    Obesity improving, patient has lost weight by changing diet and physical activity.   Patient never tried any weight loss medications. Chronic constipation, stable on over-the-counter medications. Vitamin D deficiency is on supplements no recent blood work. /80   Pulse 71   Temp 97.5 °F (36.4 °C) (Infrared)   Ht 5' (1.524 m)   Wt 189 lb (85.7 kg)   SpO2 96%   BMI 36.91 kg/m²    Body mass index is 36.91 kg/m². Wt Readings from Last 3 Encounters:   01/12/23 189 lb (85.7 kg)   04/01/22 191 lb 6.4 oz (86.8 kg)   11/19/21 192 lb 3.2 oz (87.2 kg)        [x]Negative depression screening. PHQ Scores 1/12/2023 4/1/2022 11/19/2021 1/8/2021 2/18/2020 6/11/2019 11/9/2015   PHQ2 Score 0 0 0 0 0 0 0   PHQ9 Score 0 0 0 0 0 0 0      []1-4 = Minimal depression   []5-9 = Milddepression   []10-14 = Moderate depression   []15-19 = Moderately severe depression   []20-27 = Severe depression    Discussed testing with the patient and all questions fully answered.     Office Visit on 04/01/2022   Component Date Value Ref Range Status    Hemoglobin A1C 04/01/2022 6.7  % Final         Most recent labs reviewed:     Lab Results   Component Value Date    WBC 6.4 09/27/2021    HGB 14.0 09/27/2021    HCT 43.4 09/27/2021    MCV 85.4 09/27/2021     09/27/2021       @BRIEFLAB(NA,K,CL,CO2,BUN,CREATININE,GLUCOSE,CALCIUM)@     Lab Results   Component Value Date    ALT 9 09/27/2021    AST 16 09/27/2021    ALKPHOS 83 09/27/2021    BILITOT 0.73 09/27/2021       Lab Results   Component Value Date    TSH 1.49 09/02/2020       Lab Results   Component Value Date    CHOL 117 02/28/2020    CHOL 168 07/06/2019    CHOL 183 08/18/2018     Lab Results   Component Value Date    TRIG 106 02/28/2020    TRIG 123 07/06/2019    TRIG 123 08/18/2018     Lab Results   Component Value Date    HDL 41 09/27/2021    HDL 41 02/28/2020    HDL 38 (L) 07/06/2019     Lab Results   Component Value Date    LDLCHOLESTEROL 62 09/27/2021    LDLCHOLESTEROL 55 02/28/2020    LDLCHOLESTEROL 105 07/06/2019     Lab Results   Component Value Date    VLDL NOT REPORTED 09/27/2021 VLDL NOT REPORTED 02/28/2020    VLDL NOT REPORTED 07/06/2019     Lab Results   Component Value Date    CHOLHDLRATIO 2.9 09/27/2021    CHOLHDLRATIO 2.9 02/28/2020    CHOLHDLRATIO 4.4 07/06/2019       Lab Results   Component Value Date    LABA1C 6.9 (H) 01/12/2023       No results found for: SCZJRLSS17    No results found for: FOLATE    No results found for: IRON, TIBC, FERRITIN    Lab Results   Component Value Date    VITD25 42.0 09/27/2021             Current Outpatient Medications   Medication Sig Dispense Refill    metFORMIN (GLUCOPHAGE) 500 MG tablet Take 2 tab in the morning and 1 tab in the evening 270 tablet 3    ciclopirox (PENLAC) 8 % solution apply topically to affected area nightly 6.6 mL 2    carvedilol (COREG) 12.5 MG tablet Take 12.5 mg by mouth 2 times daily (with meals)      sacubitril-valsartan (ENTRESTO)  MG per tablet Take 1 tablet by mouth 2 times daily      Cholecalciferol (VITAMIN D3) 50 MCG (2000 UT) CAPS Take 2,000 Units by mouth      atorvastatin (LIPITOR) 20 MG tablet Take 20 mg by mouth      aspirin 81 MG tablet Take 81 mg by mouth       No current facility-administered medications for this visit.              Social History     Socioeconomic History    Marital status: Single     Spouse name: Not on file    Number of children: Not on file    Years of education: Not on file    Highest education level: Not on file   Occupational History    Not on file   Tobacco Use    Smoking status: Never    Smokeless tobacco: Never   Vaping Use    Vaping Use: Never used   Substance and Sexual Activity    Alcohol use: No     Alcohol/week: 0.0 standard drinks    Drug use: No    Sexual activity: Not on file   Other Topics Concern    Not on file   Social History Narrative    Not on file     Social Determinants of Health     Financial Resource Strain: Low Risk     Difficulty of Paying Living Expenses: Not hard at all   Food Insecurity: No Food Insecurity    Worried About [address] in the Last Year: Never true    Ran Out of Food in the Last Year: Never true   Transportation Needs: Not on file   Physical Activity: Not on file   Stress: Not on file   Social Connections: Not on file   Intimate Partner Violence: Not on file   Housing Stability: Not on file     Counseling given: Not Answered        Family History   Problem Relation Age of Onset    High Blood Pressure Mother     Other Mother         glaucoma    Diabetes Mother     Diabetes Sister     High Blood Pressure Sister     High Cholesterol Sister     Cancer Brother              -rest of complaints with corresponding details per ROS    The patient's past medical, surgical, social, and family history as well as her current medications and allergies were reviewed as documented intoday's encounter. Review of Systems   Constitutional:  Negative for activity change, appetite change, fatigue, fever and unexpected weight change. HENT:  Negative for congestion, ear pain, postnasal drip, rhinorrhea, sinus pressure, sore throat and trouble swallowing. Eyes:  Negative for redness and visual disturbance. Respiratory:  Positive for chest tightness and shortness of breath. Negative for cough, wheezing and stridor. Cardiovascular:  Positive for leg swelling. Negative for chest pain and palpitations. Gastrointestinal:  Negative for abdominal distention, abdominal pain, blood in stool, constipation, diarrhea, nausea, rectal pain and vomiting. Endocrine: Negative for polydipsia, polyphagia and polyuria. Genitourinary:  Negative for difficulty urinating, flank pain, frequency and urgency. Musculoskeletal:  Positive for arthralgias, back pain and gait problem. Negative for myalgias and neck pain. Skin:  Negative for color change, rash and wound. Allergic/Immunologic: Negative for food allergies and immunocompromised state. Neurological:  Positive for numbness. Negative for dizziness, speech difficulty, weakness, light-headedness and headaches. Psychiatric/Behavioral:  Negative for agitation, behavioral problems, decreased concentration, dysphoric mood, hallucinations, sleep disturbance and suicidal ideas. The patient is nervous/anxious. Physical Exam  Vitals and nursing note reviewed. Constitutional:       General: She is not in acute distress. Appearance: Normal appearance. She is well-developed. She is obese. She is not diaphoretic. HENT:      Head: Normocephalic and atraumatic. Nose: Nose normal.   Eyes:      General:         Right eye: No discharge. Left eye: No discharge. Extraocular Movements: Extraocular movements intact. Conjunctiva/sclera: Conjunctivae normal.      Pupils: Pupils are equal, round, and reactive to light. Neck:      Thyroid: No thyromegaly. Cardiovascular:      Rate and Rhythm: Normal rate and regular rhythm. Heart sounds: Normal heart sounds. No murmur heard. Pulmonary:      Effort: Pulmonary effort is normal. No respiratory distress. Breath sounds: Normal breath sounds. No wheezing or rhonchi. Abdominal:      General: Bowel sounds are normal. There is no distension. Palpations: Abdomen is soft. There is no mass. Tenderness: There is no abdominal tenderness. There is no right CVA tenderness, left CVA tenderness, guarding or rebound. Musculoskeletal:      Cervical back: Normal range of motion and neck supple. Spasms present. No rigidity. Normal range of motion. Thoracic back: No spasms. Normal range of motion. Lumbar back: Spasms present. No tenderness. Normal range of motion. Right lower leg: No edema. Left lower leg: No edema. Right foot: Normal range of motion. Left foot: Normal range of motion. Feet:      Right foot:      Protective Sensation: 5 sites tested. 5 sites sensed. Skin integrity: Skin integrity normal.      Toenail Condition: Right toenails are normal.      Left foot:      Protective Sensation: 5 sites tested. 5 sites sensed. Skin integrity: Skin integrity normal.      Toenail Condition: Left toenails are normal.   Lymphadenopathy:      Cervical: No cervical adenopathy. Skin:     Coloration: Skin is not jaundiced or pale. Findings: No bruising, erythema or rash. Neurological:      General: No focal deficit present. Mental Status: She is alert and oriented to person, place, and time. Cranial Nerves: No cranial nerve deficit. Sensory: No sensory deficit. Motor: No weakness or tremor. Coordination: Coordination normal.      Gait: Gait and tandem walk normal.      Deep Tendon Reflexes: Reflexes are normal and symmetric. Psychiatric:         Attention and Perception: Attention and perception normal. She is attentive. Mood and Affect: Mood is anxious. Mood is not depressed. Affect is not tearful. Speech: She is communicative. Speech is not rapid and pressured, delayed or slurred. Behavior: Behavior normal. Behavior is not agitated or slowed. Thought Content: Thought content normal. Thought content is not paranoid. Judgment: Judgment normal.           ASSESSMENT AND PLAN      1. Type 2 diabetes mellitus without complication, without long-term current use of insulin ([facility])  Controlled but mild increase in a A1c, continue current treatment encourage about the weight reduction and dietary changes. Discussed low-carb low-fat diet  -  DIABETES FOOT EXAM  - Microalbumin, Ur  - POCT glycosylated hemoglobin (Hb A1C)  - CBC with Auto Differential; Future  - Comprehensive Metabolic Panel; Future  - Magnesium; Future  - Microalbumin / Creatinine Urine Ratio; Future  - Urinalysis with Reflex to Culture; Future  - metFORMIN (GLUCOPHAGE) 500 MG tablet; Take 2 tab in the morning and 1 tab in the evening  Dispense: 270 tablet; Refill: 3    2.  Essential hypertension  Controlled continue antihypertensives beta-blockers follow-up with cardiologist  Encouraged low-salt diet  Home monitoring of blood pressure  - CBC with Auto Differential; Future  - Comprehensive Metabolic Panel; Future    3. Nonischemic dilated cardiomyopathy ([address]  Chronic problem fairly stable recent echocardiogram  Follow-up with cardiologist    4. Mixed hyperlipidemia  Continue statins and aspirin, recheck lipid panel  - Lipid, Fasting; Future  - CBC with Auto Differential; Future  - Comprehensive Metabolic Panel; Future  - TSH with Reflex; Future    5. Class 2 severe obesity due to excess calories with serious comorbidity and body mass index (BMI) of 38.0 to 38.9 in   Improving continue dietary changes  - Vitamin B12 & Folate; Future  - Vitamin D 25 Hydroxy; Future  - Uric Acid; Future    6. Chronic idiopathic constipation  Chronic problem  Continue laxatives    7. Electrocardiogram abnormal  ASHER done which is normal.  No PFO seen    8. Vitamin D deficiency  Recheck vitamin D levels  - Vitamin D 25 Hydroxy; Future      Orders Placed This Encounter   Procedures    Microalbumin, Ur    Lipid, Fasting     Standing Status:   Future     Standing Expiration Date:   1/11/2024    CBC with Auto Differential     Standing Status:   Future     Standing Expiration Date:   1/13/2024    Comprehensive Metabolic Panel     Fasting 8 hrs     Standing Status:   Future     Standing Expiration Date:   1/12/2024    Magnesium     Standing Status:   Future     Standing Expiration Date:   1/12/2024    Microalbumin / Creatinine Urine Ratio     Standing Status:   Future     Standing Expiration Date:   1/12/2024    TSH with Reflex     Standing Status:   Future     Standing Expiration Date:   1/12/2024    Urinalysis with Reflex to Culture     Standing Status:   Future     Standing Expiration Date:   1/12/2024     Order Specific Question:   SPECIFY(EX-CATH,MIDSTREAM,CYSTO,ETC)?      Answer:   midstream    Vitamin B12 & Folate     Standing Status:   Future     Standing Expiration Date:   1/12/2024    Vitamin D 25 Hydroxy     Standing Status:   Future     Standing Expiration Date:   1/12/2024    Uric Acid     Standing Status:   Future     Standing Expiration Date:   1/12/2024    POCT glycosylated hemoglobin (Hb A1C)    HM DIABETES FOOT EXAM         Medications Discontinued During This Encounter   Medication Reason    metFORMIN (GLUCOPHAGE) 500 MG tablet REORDER       Bert Lewis received counseling on the following healthy behaviors: nutrition, exercise, and medication adherence  Reviewed prior labs and health maintenance  Continue current medications, diet and exercise. Discussed use, benefit, and side effects of prescribed medications. Barriers to medication compliance addressed. Patient given educational materials - see patient instructions  Was a self-tracking handout given in paper form or via "[employer]"t? Yes    Requested Prescriptions     Signed Prescriptions Disp Refills    metFORMIN (GLUCOPHAGE) 500 MG tablet 270 tablet 3     Sig: Take 2 tab in the morning and 1 tab in the evening       All patient questions answered. Patient voiced understanding. Quality Measures    Body mass index is 36.91 kg/m². Elevated. Weight control planned discussed daily exercise regimen and Healthy diet and regular exercise. BP: 118/80 Blood pressure is Normal. Treatment plan consists of Weight Reduction, DASH Eating Plan, Dietary Sodium Restriction, and No treatment change needed.     Lab Results   Component Value Date    LDLCHOLESTEROL 62 09/27/2021    (goal LDL reduction with dx if diabetes is 50% LDL reduction)      PHQ Scores 1/12/2023 4/1/2022 11/19/2021 1/8/2021 2/18/2020 6/11/2019 11/9/2015   PHQ2 Score 0 0 0 0 0 0 0   PHQ9 Score 0 0 0 0 0 0 0     Interpretation of Total Score Depression Severity: 1-4 = Minimal depression, 5-9 = Mild depression, 10-14 = Moderate depression, 15-19 = Moderately severe depression, 20-27 = Severe depression    The patient'spast medical, surgical, social, and family history as well as her   current medications and allergies were reviewed as documented in today's encounter. Medications, labs, diagnostic studies, consultations andfollow-up as documented in this encounter. Return for dm ,htn, hld, 30min,always chronic conditons, lab work. Patient wasseen with total face to face time of 35 minutes. More than 50% of this visit was counseling and education. Future Appointments   Date Time Provider Israel Hughes   4/12/2023  2:45 PM Wesley Mejia MD  natalie Santiago     This note was completed by using the assistance of a speech-recognition program. However, inadvertent computerized transcription errors may be present. Althoughevery effort was made to ensure accuracy, no guarantees can be provided that every mistake has been identified and corrected by editing.   Electronically signed by Wesley Mejia MD on 1/12/2023  6:00 PM

## 2023-01-13 ENCOUNTER — HOSPITAL ENCOUNTER (OUTPATIENT)
Age: 63
Discharge: HOME | End: 2023-01-13
Payer: COMMERCIAL

## 2023-01-13 DIAGNOSIS — E78.2 MIXED HYPERLIPIDEMIA: ICD-10-CM

## 2023-01-13 DIAGNOSIS — E55.9 VITAMIN D DEFICIENCY: ICD-10-CM

## 2023-01-13 DIAGNOSIS — E11.9 TYPE 2 DIABETES MELLITUS WITHOUT COMPLICATION, WITHOUT LONG-TERM CURRENT USE OF INSULIN (HCC): ICD-10-CM

## 2023-01-13 DIAGNOSIS — E66.01 CLASS 2 SEVERE OBESITY DUE TO EXCESS CALORIES WITH SERIOUS COMORBIDITY AND BODY MASS INDEX (BMI) OF 38.0 TO 38.9 IN ADULT (HCC): ICD-10-CM

## 2023-01-13 DIAGNOSIS — I10 ESSENTIAL HYPERTENSION: ICD-10-CM

## 2023-01-13 LAB
ABSOLUTE EOS #: 0.3 K/UL (ref 0–0.4)
ABSOLUTE LYMPH #: 2.2 K/UL (ref 1–4.8)
ABSOLUTE MONO #: 0.5 K/UL (ref 0.1–1.3)
ALBUMIN SERPL-MCNC: 3.9 G/DL (ref 3.5–5.2)
ALP BLD-CCNC: 93 U/L (ref 35–104)
ALT SERPL-CCNC: 11 U/L (ref 5–33)
ANION GAP SERPL CALCULATED.3IONS-SCNC: 9 MMOL/L (ref 9–17)
AST SERPL-CCNC: 18 U/L
BACTERIA: ABNORMAL
BASOPHILS # BLD: 1 % (ref 0–2)
BASOPHILS ABSOLUTE: 0 K/UL (ref 0–0.2)
BILIRUB SERPL-MCNC: 0.9 MG/DL (ref 0.3–1.2)
BILIRUBIN URINE: NEGATIVE
BUN BLDV-MCNC: 16 MG/DL (ref 8–23)
CALCIUM SERPL-MCNC: 9.5 MG/DL (ref 8.6–10.4)
CASTS UA: ABNORMAL /LPF
CHLORIDE BLD-SCNC: 106 MMOL/L (ref 98–107)
CHOLESTEROL, FASTING: 130 MG/DL
CHOLESTEROL/HDL RATIO: 3.2
CO2: 26 MMOL/L (ref 20–31)
COLOR: YELLOW
CREAT SERPL-MCNC: 0.57 MG/DL (ref 0.5–0.9)
CREATININE URINE: 154.2 MG/DL (ref 28–217)
EOSINOPHILS RELATIVE PERCENT: 5 % (ref 0–4)
EPITHELIAL CELLS UA: ABNORMAL /HPF
FOLATE: 16.9 NG/ML
GFR SERPL CREATININE-BSD FRML MDRD: >60 ML/MIN/1.73M2
GLUCOSE BLD-MCNC: 134 MG/DL (ref 70–99)
GLUCOSE URINE: NEGATIVE
HCT VFR BLD CALC: 41.8 % (ref 36–46)
HDLC SERPL-MCNC: 41 MG/DL
HEMOGLOBIN: 13.5 G/DL (ref 12–16)
KETONES, URINE: NEGATIVE
LDL CHOLESTEROL: 72 MG/DL (ref 0–130)
LEUKOCYTE ESTERASE, URINE: ABNORMAL
LYMPHOCYTES # BLD: 31 % (ref 24–44)
MAGNESIUM: 2 MG/DL (ref 1.6–2.6)
MCH RBC QN AUTO: 27.1 PG (ref 26–34)
MCHC RBC AUTO-ENTMCNC: 32.4 G/DL (ref 31–37)
MCV RBC AUTO: 83.7 FL (ref 80–100)
MICROALBUMIN/CREAT 24H UR: <12 MG/L
MICROALBUMIN/CREAT UR-RTO: NORMAL MCG/MG CREAT
MONOCYTES # BLD: 7 % (ref 1–7)
NITRITE, URINE: NEGATIVE
PDW BLD-RTO: 15.2 % (ref 11.5–14.9)
PH UA: 6 (ref 5–8)
PLATELET # BLD: 283 K/UL (ref 150–450)
PMV BLD AUTO: 8 FL (ref 6–12)
POTASSIUM SERPL-SCNC: 4.7 MMOL/L (ref 3.7–5.3)
PROTEIN UA: NEGATIVE
RBC # BLD: 5 M/UL (ref 4–5.2)
RBC UA: ABNORMAL /HPF
SEG NEUTROPHILS: 56 % (ref 36–66)
SEGMENTED NEUTROPHILS ABSOLUTE COUNT: 4.1 K/UL (ref 1.3–9.1)
SODIUM BLD-SCNC: 141 MMOL/L (ref 135–144)
SPECIFIC GRAVITY UA: 1.02 (ref 1–1.03)
TOTAL PROTEIN: 7.2 G/DL (ref 6.4–8.3)
TRIGLYCERIDE, FASTING: 85 MG/DL
TSH SERPL DL<=0.05 MIU/L-ACNC: 0.67 UIU/ML (ref 0.3–5)
TURBIDITY: CLEAR
URIC ACID: 6.8 MG/DL (ref 2.4–5.7)
URINE HGB: NEGATIVE
UROBILINOGEN, URINE: ABNORMAL
VITAMIN B-12: 729 PG/ML (ref 232–1245)
VITAMIN D 25-HYDROXY: 40.6 NG/ML
WBC # BLD: 7.1 K/UL (ref 3.5–11)
WBC UA: ABNORMAL /HPF

## 2023-01-13 PROCEDURE — 82746 ASSAY OF FOLIC ACID SERUM: CPT

## 2023-01-13 PROCEDURE — 83735 ASSAY OF MAGNESIUM: CPT

## 2023-01-13 PROCEDURE — 81001 URINALYSIS AUTO W/SCOPE: CPT

## 2023-01-13 PROCEDURE — 82043 UR ALBUMIN QUANTITATIVE: CPT

## 2023-01-13 PROCEDURE — 84443 ASSAY THYROID STIM HORMONE: CPT

## 2023-01-13 PROCEDURE — 82306 VITAMIN D 25 HYDROXY: CPT

## 2023-01-13 PROCEDURE — 84550 ASSAY OF BLOOD/URIC ACID: CPT

## 2023-01-13 PROCEDURE — 80053 COMPREHEN METABOLIC PANEL: CPT

## 2023-01-13 PROCEDURE — 80061 LIPID PANEL: CPT

## 2023-01-13 PROCEDURE — 36415 COLL VENOUS BLD VENIPUNCTURE: CPT

## 2023-01-13 PROCEDURE — 82607 VITAMIN B-12: CPT

## 2023-01-13 PROCEDURE — 85025 COMPLETE CBC W/AUTO DIFF WBC: CPT

## 2023-01-13 PROCEDURE — 82570 ASSAY OF URINE CREATININE: CPT

## 2023-01-13 RX ORDER — CARVEDILOL 12.5 MG/1
12.5 TABLET ORAL 2 TIMES DAILY WITH MEALS
Qty: 180 TABLET | Refills: 1 | Status: SHIPPED | OUTPATIENT
Start: 2023-01-13

## 2023-01-13 NOTE — TELEPHONE ENCOUNTER
Please Approve or Refuse.   Send to Pharmacy per Pt's Request: Zulay Jones     Next Visit Date:  4/12/2023   Last Visit Date: 1/12/2023    Hemoglobin A1C (%)   Date Value   01/12/2023 6.9 (H)   04/01/2022 6.7   11/19/2021 6.4             ( goal A1C is < 7)   BP Readings from Last 3 Encounters:   01/12/23 118/80   04/01/22 122/78   11/19/21 124/72          (goal 120/80)  BUN   Date Value Ref Range Status   11/01/2021 21 8 - 23 mg/dL Final     Creatinine   Date Value Ref Range Status   11/01/2021 0.76 0.50 - 0.90 mg/dL Final     Potassium   Date Value Ref Range Status   11/01/2021 4.8 3.7 - 5.3 mmol/L Final

## 2023-04-12 PROBLEM — R94.31 ELECTROCARDIOGRAM ABNORMAL: Status: RESOLVED | Noted: 2020-06-10 | Resolved: 2023-04-12

## 2023-08-12 ENCOUNTER — HOSPITAL ENCOUNTER (OUTPATIENT)
Dept: WOMENS IMAGING | Age: 63
End: 2023-08-12
Payer: COMMERCIAL

## 2023-08-12 VITALS — BODY MASS INDEX: 37.11 KG/M2 | WEIGHT: 189 LBS | HEIGHT: 60 IN

## 2023-08-12 DIAGNOSIS — Z12.31 SCREENING MAMMOGRAM FOR HIGH-RISK PATIENT: ICD-10-CM

## 2023-08-12 PROCEDURE — 77063 BREAST TOMOSYNTHESIS BI: CPT

## 2023-08-14 ENCOUNTER — HOSPITAL ENCOUNTER (OUTPATIENT)
Dept: GENERAL RADIOLOGY | Age: 63
Discharge: HOME | End: 2023-08-16
Payer: COMMERCIAL

## 2023-08-14 ENCOUNTER — OFFICE VISIT (OUTPATIENT)
Dept: FAMILY MEDICINE CLINIC | Age: 63
End: 2023-08-14
Payer: COMMERCIAL

## 2023-08-14 ENCOUNTER — HOSPITAL ENCOUNTER (OUTPATIENT)
Age: 63
Discharge: HOME | End: 2023-08-16
Payer: COMMERCIAL

## 2023-08-14 VITALS
HEART RATE: 78 BPM | OXYGEN SATURATION: 98 % | DIASTOLIC BLOOD PRESSURE: 70 MMHG | BODY MASS INDEX: 37.03 KG/M2 | WEIGHT: 188.6 LBS | HEIGHT: 60 IN | SYSTOLIC BLOOD PRESSURE: 98 MMHG

## 2023-08-14 DIAGNOSIS — I50.22 CHRONIC SYSTOLIC (CONGESTIVE) HEART FAILURE (HCC): ICD-10-CM

## 2023-08-14 DIAGNOSIS — R09.89 RHONCHI AT RIGHT LUNG BASE: ICD-10-CM

## 2023-08-14 DIAGNOSIS — I42.0 NONISCHEMIC DILATED CARDIOMYOPATHY (HCC): ICD-10-CM

## 2023-08-14 DIAGNOSIS — E11.9 TYPE 2 DIABETES MELLITUS WITHOUT COMPLICATION, WITHOUT LONG-TERM CURRENT USE OF INSULIN (HCC): Primary | ICD-10-CM

## 2023-08-14 DIAGNOSIS — E79.0 HYPERURICEMIA: ICD-10-CM

## 2023-08-14 DIAGNOSIS — I10 ESSENTIAL HYPERTENSION: ICD-10-CM

## 2023-08-14 DIAGNOSIS — E78.2 MIXED HYPERLIPIDEMIA: ICD-10-CM

## 2023-08-14 PROBLEM — R94.39 ABNORMAL STRESS TEST: Status: RESOLVED | Noted: 2019-08-15 | Resolved: 2023-08-14

## 2023-08-14 LAB — HBA1C MFR BLD: 6.6 %

## 2023-08-14 PROCEDURE — 99214 OFFICE O/P EST MOD 30 MIN: CPT | Performed by: FAMILY MEDICINE

## 2023-08-14 PROCEDURE — 3078F DIAST BP <80 MM HG: CPT | Performed by: FAMILY MEDICINE

## 2023-08-14 PROCEDURE — 83037 HB GLYCOSYLATED A1C HOME DEV: CPT | Performed by: FAMILY MEDICINE

## 2023-08-14 PROCEDURE — 3044F HG A1C LEVEL LT 7.0%: CPT | Performed by: FAMILY MEDICINE

## 2023-08-14 PROCEDURE — 71046 X-RAY EXAM CHEST 2 VIEWS: CPT

## 2023-08-14 PROCEDURE — 3074F SYST BP LT 130 MM HG: CPT | Performed by: FAMILY MEDICINE

## 2023-08-14 SDOH — ECONOMIC STABILITY: FOOD INSECURITY: WITHIN THE PAST 12 MONTHS, THE FOOD YOU BOUGHT JUST DIDN'T LAST AND YOU DIDN'T HAVE MONEY TO GET MORE.: NEVER TRUE

## 2023-08-14 SDOH — ECONOMIC STABILITY: FOOD INSECURITY: WITHIN THE PAST 12 MONTHS, YOU WORRIED THAT YOUR FOOD WOULD RUN OUT BEFORE YOU GOT MONEY TO BUY MORE.: NEVER TRUE

## 2023-08-14 SDOH — ECONOMIC STABILITY: HOUSING INSECURITY
IN THE LAST 12 MONTHS, WAS THERE A TIME WHEN YOU DID NOT HAVE A STEADY PLACE TO SLEEP OR SLEPT IN A SHELTER (INCLUDING NOW)?: NO

## 2023-08-14 SDOH — ECONOMIC STABILITY: INCOME INSECURITY: HOW HARD IS IT FOR YOU TO PAY FOR THE VERY BASICS LIKE FOOD, HOUSING, MEDICAL CARE, AND HEATING?: NOT HARD AT ALL

## 2023-08-14 ASSESSMENT — ENCOUNTER SYMPTOMS
BACK PAIN: 0
SINUS PRESSURE: 0
RECTAL PAIN: 0
WHEEZING: 0
COUGH: 0
BLOOD IN STOOL: 0
CONSTIPATION: 0
RHINORRHEA: 0
ABDOMINAL DISTENTION: 0
COLOR CHANGE: 0
DIARRHEA: 0
TROUBLE SWALLOWING: 0
EYE REDNESS: 0
NAUSEA: 0
ABDOMINAL PAIN: 0
STRIDOR: 0
SORE THROAT: 0
SHORTNESS OF BREATH: 0
CHEST TIGHTNESS: 0
VOMITING: 0

## 2023-08-14 ASSESSMENT — PATIENT HEALTH QUESTIONNAIRE - PHQ9
SUM OF ALL RESPONSES TO PHQ QUESTIONS 1-9: 0
2. FEELING DOWN, DEPRESSED OR HOPELESS: 0
SUM OF ALL RESPONSES TO PHQ9 QUESTIONS 1 & 2: 0
1. LITTLE INTEREST OR PLEASURE IN DOING THINGS: 0
SUM OF ALL RESPONSES TO PHQ QUESTIONS 1-9: 0

## 2023-08-14 NOTE — PATIENT INSTRUCTIONS
Thank you for choosing [facility]) as your healthcare provider as your care is important to us. Please arrive at your appointment on time. If you are unable to make your appointment, please call our office as soon as possible so that we may reschedule your appointment. Missing 3 appointments in a calendar year without notifying the office may lead to dismissal from the practice. We appreciate you calling at least 24 hours in advance, when possible. Thank you. New Updates for [facility]    Thank you for choosing [facility] to give you the best care! [facility]) is always trying to think of new ways to help their patients. We are asking all patients to try out the new digital registration that is now available through your [facility] account Via the giovanni you're now able to update your personal and registration information prior to your upcoming appointment. This will save you time once you arrive at the office to check-in, not to mention your information remains safe!! Many other perks come from signing up for an account, such as:  Requesting refills  Scheduling an appointment  Completing an E-Visit  Sending a message to the office/provider  Having access to your medication list  Paying your bill/copay prior to your appointment  Scheduling your yearly mammogram  Review your test results    If you are not familiar with [facility] please ask one of us and we will be happy to answer any questions or help you set-up your account.       Your [employer],

## 2023-08-14 NOTE — PROGRESS NOTES
Visit Information    Have you changed or started any medications since your last visit including any over-the-counter medicines, vitamins, or herbal medicines? no   Are you having any side effects from any of your medications? -  no  Have you stopped taking any of your medications? Is so, why? -  no    Have you seen any other physician or provider since your last visit? No  Have you had any other diagnostic tests since your last visit? No  Have you been seen in the emergency room and/or had an admission to a hospital since we last saw you? No  Have you had your routine dental cleaning in the past 6 months? no    Have you activated your [employer] account? If not, what are your barriers?  Yes     Patient Care Team:  Rogerio López MD as PCP - General (Family Medicine)  Rogerio López MD as PCP - Empaneled Provider  Jamilah Melvin MD as Consulting Physician (Cardiology)    Medical History Review  Past Medical, Family, and Social History reviewed and does contribute to the patient presenting condition    Health Maintenance   Topic Date Due    Diabetic retinal exam  02/22/2020    COVID-19 Vaccine (3 - Booster for Charlene Fernandoy series) 07/01/2021    Flu vaccine (1) 08/01/2023    Diabetic foot exam  01/12/2024    Depression Screen  01/12/2024    Diabetic Alb to Cr ratio (uACR) test  01/13/2024    Lipids  01/13/2024    GFR test (Diabetes, CKD 3-4, OR last GFR 15-59)  01/13/2024    A1C test (Diabetic or Prediabetic)  04/12/2024    Breast cancer screen  05/27/2024    Cervical cancer screen  08/07/2025    Colorectal Cancer Screen  10/12/2028    DTaP/Tdap/Td vaccine (2 - Td or Tdap) 04/01/2032    Shingles vaccine  Completed    Pneumococcal 0-64 years Vaccine  Completed    Hepatitis C screen  Completed    HIV screen  Completed    Hepatitis A vaccine  Aged Out    Hib vaccine  Aged Out    Meningococcal (ACWY) vaccine  Aged Out
Year: No     Counseling given: Not Answered        Family History   Problem Relation Age of Onset    High Blood Pressure Mother     Other Mother         glaucoma    Diabetes Mother     Diabetes Sister     High Blood Pressure Sister     High Cholesterol Sister     Cancer Brother              -rest of complaints with corresponding details per ROS    The patient's past medical, surgical, social, and family history as well as her current medications and allergies were reviewed as documented intoday's encounter. Review of Systems   Constitutional:  Positive for unexpected weight change. Negative for appetite change, fatigue and fever. HENT:  Negative for congestion, ear pain, postnasal drip, rhinorrhea, sinus pressure, sore throat and trouble swallowing. Eyes:  Negative for redness and visual disturbance. Respiratory:  Negative for cough, chest tightness, shortness of breath, wheezing and stridor. Cardiovascular:  Negative for chest pain, palpitations and leg swelling. Gastrointestinal:  Negative for abdominal distention, abdominal pain, blood in stool, constipation, diarrhea, nausea, rectal pain and vomiting. Endocrine: Negative for polydipsia, polyphagia and polyuria. Genitourinary:  Negative for difficulty urinating, flank pain, frequency and urgency. Musculoskeletal:  Positive for arthralgias. Negative for back pain, gait problem, myalgias and neck pain. Skin:  Negative for color change, rash and wound. Allergic/Immunologic: Negative for food allergies and immunocompromised state. Neurological:  Negative for dizziness, speech difficulty, weakness, light-headedness, numbness and headaches. Psychiatric/Behavioral:  Negative for agitation, behavioral problems, decreased concentration, dysphoric mood, hallucinations, sleep disturbance and suicidal ideas. The patient is nervous/anxious. Physical Exam  Vitals and nursing note reviewed.    Constitutional:       General: She is not in

## 2023-08-15 DIAGNOSIS — J98.8 CONGESTION OF RESPIRATORY TRACT: Primary | ICD-10-CM

## 2023-08-15 RX ORDER — CETIRIZINE HYDROCHLORIDE 10 MG/1
10 TABLET ORAL DAILY
Qty: 30 TABLET | Refills: 0 | Status: SHIPPED | OUTPATIENT
Start: 2023-08-15

## 2023-08-15 RX ORDER — GUAIFENESIN AND DEXTROMETHORPHAN HYDROBROMIDE 600; 30 MG/1; MG/1
1 TABLET, EXTENDED RELEASE ORAL 2 TIMES DAILY PRN
Qty: 28 TABLET | Refills: 0 | Status: SHIPPED | OUTPATIENT
Start: 2023-08-15

## 2023-10-11 RX ORDER — ATORVASTATIN CALCIUM 20 MG/1
20 TABLET, FILM COATED ORAL DAILY
Qty: 90 TABLET | Refills: 1 | Status: SHIPPED | OUTPATIENT
Start: 2023-10-11

## 2023-10-11 NOTE — TELEPHONE ENCOUNTER
Please Approve or Refuse.   Send to Pharmacy per Pt's Request:      Next Visit Date:  11/14/2023   Last Visit Date: 8/14/2023    Hemoglobin A1C (%)   Date Value   08/14/2023 6.6   04/12/2023 6.4   01/12/2023 6.9 (H)             ( goal A1C is < 7)   BP Readings from Last 3 Encounters:   08/14/23 98/70   04/12/23 110/70   01/12/23 118/80          (goal 120/80)  BUN   Date Value Ref Range Status   01/13/2023 16 8 - 23 mg/dL Final     Creatinine   Date Value Ref Range Status   01/13/2023 0.57 0.50 - 0.90 mg/dL Final     Potassium   Date Value Ref Range Status   01/13/2023 4.7 3.7 - 5.3 mmol/L Final

## 2024-01-08 DIAGNOSIS — E11.9 TYPE 2 DIABETES MELLITUS WITHOUT COMPLICATION, WITHOUT LONG-TERM CURRENT USE OF INSULIN (HCC): ICD-10-CM

## 2024-01-08 RX ORDER — CARVEDILOL 12.5 MG/1
12.5 TABLET ORAL 2 TIMES DAILY WITH MEALS
Qty: 180 TABLET | Refills: 1 | Status: SHIPPED | OUTPATIENT
Start: 2024-01-08

## 2024-01-16 ENCOUNTER — TELEPHONE (OUTPATIENT)
Dept: FAMILY MEDICINE CLINIC | Age: 64
End: 2024-01-16

## 2024-01-16 NOTE — TELEPHONE ENCOUNTER
Hunter called.    She has been experiencing heart racing and vaginal issues with the medication Farxiga.    She started taking it about 2-3 months ago and has been on and off of it due to these symptoms.    Called here thinking Dr. Viveros prescribed it but it was in fact Dr. Juan Iglesias, Cardiologist at [facility].     Rose at [facility] Confirmed it was not Dr. Viveros who prescribed.    Called Hunter back and explained that she needs to contact [facility] and let them know about the symptoms to be treated. She understood and will call them.    Thank you.

## 2024-03-15 ENCOUNTER — TELEPHONE (OUTPATIENT)
Dept: FAMILY MEDICINE CLINIC | Age: 64
End: 2024-03-15

## 2024-03-15 NOTE — TELEPHONE ENCOUNTER
Patient states she went to ER had a uti took all of her antibiotic and she is still having symptoms please advise

## 2024-03-15 NOTE — TELEPHONE ENCOUNTER
Not sure which ER patient went to, did not see any records, patient has appointment scheduled we can discuss at that time.  I would recommend hydration at this time.

## 2024-03-18 ENCOUNTER — TELEPHONE (OUTPATIENT)
Dept: FAMILY MEDICINE CLINIC | Age: 64
End: 2024-03-18

## 2024-03-18 NOTE — TELEPHONE ENCOUNTER
PATIENT WENT TO URGENT CARE/CHANGED NEXT APPOINTMENT TO PAP SMEAR DUE TO PATIENT CURRENTLY IS STILL STRUGGLING WITH UTI AND WOULD LIKE TO DO PAP SMEAR ASAP

## 2024-03-18 NOTE — TELEPHONE ENCOUNTER
----- Message from Tyler Toro sent at 3/18/2024 11:53 AM EDT -----  Subject: Message to Provider    QUESTIONS  Information for Provider? Patient is also requesting PAP for upcoming   03/21 appt   ---------------------------------------------------------------------------  --------------  CALL BACK INFO  [phone]; OK to leave message on voicemail  ---------------------------------------------------------------------------  --------------  SCRIPT ANSWERS  Relationship to Patient? Self

## 2024-03-21 ENCOUNTER — OFFICE VISIT (OUTPATIENT)
Dept: FAMILY MEDICINE CLINIC | Age: 64
End: 2024-03-21
Payer: COMMERCIAL

## 2024-03-21 ENCOUNTER — HOSPITAL ENCOUNTER (OUTPATIENT)
Age: 64
Setting detail: SPECIMEN
Discharge: HOME | End: 2024-03-21

## 2024-03-21 VITALS
HEIGHT: 60 IN | WEIGHT: 191 LBS | DIASTOLIC BLOOD PRESSURE: 80 MMHG | OXYGEN SATURATION: 94 % | BODY MASS INDEX: 37.5 KG/M2 | HEART RATE: 84 BPM | SYSTOLIC BLOOD PRESSURE: 128 MMHG

## 2024-03-21 DIAGNOSIS — I42.0 NONISCHEMIC DILATED CARDIOMYOPATHY (HCC): ICD-10-CM

## 2024-03-21 DIAGNOSIS — E78.2 MIXED HYPERLIPIDEMIA: ICD-10-CM

## 2024-03-21 DIAGNOSIS — E11.9 TYPE 2 DIABETES MELLITUS WITHOUT COMPLICATION, WITHOUT LONG-TERM CURRENT USE OF INSULIN (HCC): ICD-10-CM

## 2024-03-21 DIAGNOSIS — I50.22 CHRONIC SYSTOLIC (CONGESTIVE) HEART FAILURE (HCC): ICD-10-CM

## 2024-03-21 DIAGNOSIS — E55.9 VITAMIN D DEFICIENCY: ICD-10-CM

## 2024-03-21 DIAGNOSIS — Z12.4 PAP SMEAR FOR CERVICAL CANCER SCREENING: Primary | ICD-10-CM

## 2024-03-21 LAB — HBA1C MFR BLD: 7.1 %

## 2024-03-21 PROCEDURE — 3079F DIAST BP 80-89 MM HG: CPT | Performed by: FAMILY MEDICINE

## 2024-03-21 PROCEDURE — 99396 PREV VISIT EST AGE 40-64: CPT | Performed by: FAMILY MEDICINE

## 2024-03-21 PROCEDURE — 3074F SYST BP LT 130 MM HG: CPT | Performed by: FAMILY MEDICINE

## 2024-03-21 PROCEDURE — 83036 HEMOGLOBIN GLYCOSYLATED A1C: CPT | Performed by: FAMILY MEDICINE

## 2024-03-21 RX ORDER — NITROFURANTOIN 25; 75 MG/1; MG/1
CAPSULE ORAL
COMMUNITY
Start: 2024-03-07

## 2024-03-21 SDOH — ECONOMIC STABILITY: FOOD INSECURITY: WITHIN THE PAST 12 MONTHS, YOU WORRIED THAT YOUR FOOD WOULD RUN OUT BEFORE YOU GOT MONEY TO BUY MORE.: NEVER TRUE

## 2024-03-21 SDOH — ECONOMIC STABILITY: INCOME INSECURITY: HOW HARD IS IT FOR YOU TO PAY FOR THE VERY BASICS LIKE FOOD, HOUSING, MEDICAL CARE, AND HEATING?: NOT HARD AT ALL

## 2024-03-21 SDOH — ECONOMIC STABILITY: FOOD INSECURITY: WITHIN THE PAST 12 MONTHS, THE FOOD YOU BOUGHT JUST DIDN'T LAST AND YOU DIDN'T HAVE MONEY TO GET MORE.: NEVER TRUE

## 2024-03-21 ASSESSMENT — ENCOUNTER SYMPTOMS
SINUS PRESSURE: 0
COUGH: 0
COLOR CHANGE: 0
SHORTNESS OF BREATH: 0
STRIDOR: 0
BLOOD IN STOOL: 0
RHINORRHEA: 0
CHEST TIGHTNESS: 0
EYE REDNESS: 0
ABDOMINAL PAIN: 0
CONSTIPATION: 0
RECTAL PAIN: 0
BACK PAIN: 0
SORE THROAT: 0

## 2024-03-21 ASSESSMENT — PATIENT HEALTH QUESTIONNAIRE - PHQ9
SUM OF ALL RESPONSES TO PHQ QUESTIONS 1-9: 0
SUM OF ALL RESPONSES TO PHQ QUESTIONS 1-9: 0
2. FEELING DOWN, DEPRESSED OR HOPELESS: NOT AT ALL
1. LITTLE INTEREST OR PLEASURE IN DOING THINGS: NOT AT ALL
SUM OF ALL RESPONSES TO PHQ9 QUESTIONS 1 & 2: 0
SUM OF ALL RESPONSES TO PHQ QUESTIONS 1-9: 0
SUM OF ALL RESPONSES TO PHQ QUESTIONS 1-9: 0

## 2024-03-21 NOTE — PROGRESS NOTES
3/21/2024      Hunter Yang (:  1960) is a 63 y.o. female, here for a preventive medicine evaluation, Gynecologic Exam, Diabetes (a1c), and Dysuria (Burning  and hurting when peeing been since the 5th of this month /Still taking antibiotics )   .      ASSESSMENT/PLAN:    1. Pap smear for cervical cancer screening    -     PAP Smear; Future  2. Type 2 diabetes mellitus without complication, without long-term current use of insulin (HCC)  Controlled, continue metformin monitor labs  -     POCT glycosylated hemoglobin (Hb A1C)  -     Lipid Panel; Future  -     CBC with Auto Differential; Future  -     Comprehensive Metabolic Panel; Future  -     Magnesium; Future  -     Microalbumin / Creatinine Urine Ratio; Future  -     Vitamin B12 & Folate; Future  -     Uric Acid; Future  -     TSH; Future  -     Urinalysis with Reflex to Culture; Future  3. Chronic systolic (congestive) heart failure (HCC)  Stable follow-up with cardiologist  4. Nonischemic dilated cardiomyopathy (HCC)  Stable, continue medications follow-up with cardiologist  5. Mixed hyperlipidemia  -     Lipid Panel; Future  6. Vitamin D deficiency  -     Vitamin D 25 Hydroxy; Future      Low carb, low fat diet, increase fruits and vegetables, and exercise 4-5 times a week 30-40 minutes a day, or walk 1-2 hours per day, or wear a pedometer and get at least 10,000 steps per day.    Dental exam 2-3 times /year advised.  Immunizations reviewed.    Health Maintenance reviewed   Smoking cessation counseling given yes      Annual eye exam advised.      Hunter received counseling on the following healthy behaviors: nutrition, exercise, and medication adherence  Reviewed prior labs and health maintenance  Discussed use, benefit, and side effects of prescribed medications.   Barriers to medication compliance addressed.   Patient given educational materials - see patient instructions  All patient questions answered.  Patient voiced understanding.    The

## 2024-03-21 NOTE — PATIENT INSTRUCTIONS
Dear valued patient,    We hope this message finds you in good health. At [ ], we are committed to providing you with the best possible healthcare experience. To further enhance your convenience and streamline your access to our services, we would like to introduce you to the benefits of utilizing direct scheduling through the [facility] Patient Portal.    Direct scheduling is a feature within the [facility] Patient Portal that allows you to schedule appointments with your healthcare provider directly, without the need to make a phone call or wait for a callback. We understand that your time is miguel, and we want to ensure that you have quick and easy access to our services whenever you need them.  Here are some reasons why you should consider utilizing direct scheduling through the [facility] Patient Portal:    1. Convenience: With direct scheduling, you can book appointments at any time that suits you best, 24 hours a day, 7 days a week. No more waiting on hold or playing phone tag with our office staff. It puts you in control of managing your healthcare appointments effortlessly.    2. Accessibility: The [facility] Patient Portal is accessible through your computer, smartphone, or tablet, allowing you to schedule appointments from the comfort of your home, office, or on the go. You can access your medical records, review test results, and communicate with your healthcare provider all in one secure and user-friendly platform.    3. Timesaving: By utilizing direct scheduling, you can avoid unnecessary delays and save miguel time. You can easily browse the available appointment slots and select the one that works best for you, eliminating the back-and-forth communication typically required when scheduling via phone.    4. Reminders and notifications: [facility] Patient Portal sends automatic reminders for upcoming appointments, ensuring that you never miss an important visit. You can also receive notifications about test

## 2024-03-21 NOTE — PROGRESS NOTES
Visit Information    Have you changed or started any medications since your last visit including any over-the-counter medicines, vitamins, or herbal medicines? no   Have you stopped taking any of your medications? Is so, why? -  no  Are you having any side effects from any of your medications? - no    Have you seen any other physician or provider since your last visit?  no   Have you had any other diagnostic tests since your last visit?  no   Have you been seen in the emergency room and/or had an admission in a hospital since we last saw you?  no   Have you had your routine dental cleaning in the past 6 months?  no     Do you have an active MyChart account? If no, what is the barrier?  Yes    Patient Care Team:  Dominique Viveros MD as PCP - General (Family Medicine)  Dominique Viveros MD as PCP - Empaneled Provider  Dima Otero MD as Consulting Physician (Cardiology)    Medical History Review  Past Medical, Family, and Social History reviewed and does contribute to the patient presenting condition    Health Maintenance   Topic Date Due    Diabetic retinal exam  02/22/2020    Respiratory Syncytial Virus (RSV) Pregnant or age 60 yrs+ (1 - 1-dose 60+ series) Never done    Pneumococcal 0-64 years Vaccine (2 of 2 - PCV) 08/07/2021    Flu vaccine (1) 08/01/2023    COVID-19 Vaccine (4 - 2023-24 season) 09/01/2023    Diabetic foot exam  01/12/2024    Diabetic Alb to Cr ratio (uACR) test  01/13/2024    Lipids  01/13/2024    GFR test (Diabetes, CKD 3-4, OR last GFR 15-59)  01/13/2024    A1C test (Diabetic or Prediabetic)  08/14/2024    Depression Screen  08/14/2024    Cervical cancer screen  08/07/2025    Breast cancer screen  08/12/2025    Colorectal Cancer Screen  10/12/2028    DTaP/Tdap/Td vaccine (2 - Td or Tdap) 04/01/2032    Shingles vaccine  Completed    Hepatitis C screen  Completed    HIV screen  Completed    Hepatitis A vaccine  Aged Out    Hepatitis B vaccine  Aged Out    Hib vaccine  Aged Out    Polio vaccine

## 2024-04-05 LAB — CYTOLOGY REPORT: NORMAL

## 2024-04-11 RX ORDER — ATORVASTATIN CALCIUM 20 MG/1
20 TABLET, FILM COATED ORAL DAILY
Qty: 90 TABLET | Refills: 1 | Status: SHIPPED | OUTPATIENT
Start: 2024-04-11

## 2024-04-11 NOTE — TELEPHONE ENCOUNTER
Please Approve or Refuse.  Send to Pharmacy per Pt's Request: [facility]     Next Visit Date:  7/12/2024   Last Visit Date: 3/21/2024    Hemoglobin A1C (%)   Date Value   03/21/2024 7.1   08/14/2023 6.6   04/12/2023 6.4             ( goal A1C is < 7)   BP Readings from Last 3 Encounters:   03/21/24 128/80   08/14/23 98/70   04/12/23 110/70          (goal 120/80)  BUN   Date Value Ref Range Status   01/13/2023 16 8 - 23 mg/dL Final     Creatinine   Date Value Ref Range Status   01/13/2023 0.57 0.50 - 0.90 mg/dL Final     Potassium   Date Value Ref Range Status   01/13/2023 4.7 3.7 - 5.3 mmol/L Final

## 2024-04-13 ENCOUNTER — HOSPITAL ENCOUNTER (OUTPATIENT)
Age: 64
Discharge: HOME | End: 2024-04-13
Payer: COMMERCIAL

## 2024-04-13 LAB
25(OH)D3 SERPL-MCNC: 41.3 NG/ML (ref 30–100)
ALBUMIN SERPL-MCNC: 3.8 G/DL (ref 3.5–5.2)
ALP SERPL-CCNC: 111 U/L (ref 35–104)
ALT SERPL-CCNC: 7 U/L (ref 5–33)
ANION GAP SERPL CALCULATED.3IONS-SCNC: 11 MMOL/L (ref 9–17)
AST SERPL-CCNC: 15 U/L
BACTERIA URNS QL MICRO: ABNORMAL
BASOPHILS # BLD: 0.1 K/UL (ref 0–0.2)
BASOPHILS NFR BLD: 1 % (ref 0–2)
BILIRUB SERPL-MCNC: 1 MG/DL (ref 0.3–1.2)
BILIRUB UR QL STRIP: NEGATIVE
BUN SERPL-MCNC: 15 MG/DL (ref 8–23)
CALCIUM SERPL-MCNC: 9.1 MG/DL (ref 8.6–10.4)
CASTS #/AREA URNS LPF: ABNORMAL /LPF
CHLORIDE SERPL-SCNC: 105 MMOL/L (ref 98–107)
CHOLEST SERPL-MCNC: 113 MG/DL
CHOLESTEROL/HDL RATIO: 2.8
CLARITY UR: ABNORMAL
CO2 SERPL-SCNC: 22 MMOL/L (ref 20–31)
COLOR UR: YELLOW
CREAT SERPL-MCNC: 0.6 MG/DL (ref 0.5–0.9)
CREAT UR-MCNC: 125 MG/DL (ref 28–217)
EOSINOPHIL # BLD: 0.2 K/UL (ref 0–0.4)
EOSINOPHILS RELATIVE PERCENT: 3 % (ref 0–4)
EPI CELLS #/AREA URNS HPF: ABNORMAL /HPF
ERYTHROCYTE [DISTWIDTH] IN BLOOD BY AUTOMATED COUNT: 15.3 % (ref 11.5–14.9)
FOLATE SERPL-MCNC: 17.2 NG/ML (ref 4.8–24.2)
GFR SERPL CREATININE-BSD FRML MDRD: >90 ML/MIN/1.73M2
GLUCOSE SERPL-MCNC: 144 MG/DL (ref 70–99)
GLUCOSE UR STRIP-MCNC: NEGATIVE MG/DL
HCT VFR BLD AUTO: 45.1 % (ref 36–46)
HDLC SERPL-MCNC: 40 MG/DL
HGB BLD-MCNC: 14.4 G/DL (ref 12–16)
HGB UR QL STRIP.AUTO: ABNORMAL
KETONES UR STRIP-MCNC: NEGATIVE MG/DL
LDLC SERPL CALC-MCNC: 55 MG/DL (ref 0–130)
LEUKOCYTE ESTERASE UR QL STRIP: ABNORMAL
LYMPHOCYTES NFR BLD: 1.8 K/UL (ref 1–4.8)
LYMPHOCYTES RELATIVE PERCENT: 22 % (ref 24–44)
MAGNESIUM SERPL-MCNC: 1.9 MG/DL (ref 1.6–2.6)
MCH RBC QN AUTO: 27.2 PG (ref 26–34)
MCHC RBC AUTO-ENTMCNC: 31.8 G/DL (ref 31–37)
MCV RBC AUTO: 85.4 FL (ref 80–100)
MICROALBUMIN UR-MCNC: 235 MG/L (ref 0–20)
MICROALBUMIN/CREAT UR-RTO: 188 MCG/MG CREAT (ref 0–25)
MONOCYTES NFR BLD: 0.6 K/UL (ref 0.1–1.3)
MONOCYTES NFR BLD: 7 % (ref 1–7)
NEUTROPHILS NFR BLD: 67 % (ref 36–66)
NEUTS SEG NFR BLD: 5.7 K/UL (ref 1.3–9.1)
NITRITE UR QL STRIP: POSITIVE
PH UR STRIP: 5 [PH] (ref 5–8)
PLATELET # BLD AUTO: 292 K/UL (ref 150–450)
PMV BLD AUTO: 8.3 FL (ref 6–12)
POTASSIUM SERPL-SCNC: 4.7 MMOL/L (ref 3.7–5.3)
PROT SERPL-MCNC: 7.2 G/DL (ref 6.4–8.3)
PROT UR STRIP-MCNC: ABNORMAL MG/DL
RBC # BLD AUTO: 5.29 M/UL (ref 4–5.2)
RBC #/AREA URNS HPF: ABNORMAL /HPF
SODIUM SERPL-SCNC: 138 MMOL/L (ref 135–144)
SP GR UR STRIP: 1.02 (ref 1–1.03)
TRIGL SERPL-MCNC: 89 MG/DL
TSH SERPL DL<=0.05 MIU/L-ACNC: 1.04 UIU/ML (ref 0.3–5)
URATE SERPL-MCNC: 5.5 MG/DL (ref 2.4–5.7)
UROBILINOGEN UR STRIP-ACNC: NORMAL EU/DL (ref 0–1)
VIT B12 SERPL-MCNC: 734 PG/ML (ref 232–1245)
WBC #/AREA URNS HPF: ABNORMAL /HPF
WBC OTHER # BLD: 8.3 K/UL (ref 3.5–11)

## 2024-04-13 PROCEDURE — 87086 URINE CULTURE/COLONY COUNT: CPT

## 2024-04-13 PROCEDURE — 87088 URINE BACTERIA CULTURE: CPT

## 2024-04-13 PROCEDURE — 36415 COLL VENOUS BLD VENIPUNCTURE: CPT

## 2024-04-13 PROCEDURE — 82570 ASSAY OF URINE CREATININE: CPT

## 2024-04-13 PROCEDURE — 84550 ASSAY OF BLOOD/URIC ACID: CPT

## 2024-04-13 PROCEDURE — 81001 URINALYSIS AUTO W/SCOPE: CPT

## 2024-04-13 PROCEDURE — 82607 VITAMIN B-12: CPT

## 2024-04-13 PROCEDURE — 82306 VITAMIN D 25 HYDROXY: CPT

## 2024-04-13 PROCEDURE — 80061 LIPID PANEL: CPT

## 2024-04-13 PROCEDURE — 84443 ASSAY THYROID STIM HORMONE: CPT

## 2024-04-13 PROCEDURE — 83735 ASSAY OF MAGNESIUM: CPT

## 2024-04-13 PROCEDURE — 85025 COMPLETE CBC W/AUTO DIFF WBC: CPT

## 2024-04-13 PROCEDURE — 82043 UR ALBUMIN QUANTITATIVE: CPT

## 2024-04-13 PROCEDURE — 82746 ASSAY OF FOLIC ACID SERUM: CPT

## 2024-04-13 PROCEDURE — 80053 COMPREHEN METABOLIC PANEL: CPT

## 2024-04-13 PROCEDURE — 87186 SC STD MICRODIL/AGAR DIL: CPT

## 2024-04-14 LAB
MICROORGANISM SPEC CULT: ABNORMAL
SPECIMEN DESCRIPTION: ABNORMAL

## 2024-04-15 DIAGNOSIS — N30.01 ACUTE CYSTITIS WITH HEMATURIA: Primary | ICD-10-CM

## 2024-04-17 ENCOUNTER — TELEPHONE (OUTPATIENT)
Dept: FAMILY MEDICINE CLINIC | Age: 64
End: 2024-04-17

## 2024-04-17 DIAGNOSIS — N30.00 ACUTE CYSTITIS WITHOUT HEMATURIA: Primary | ICD-10-CM

## 2024-04-17 RX ORDER — SULFAMETHOXAZOLE AND TRIMETHOPRIM 800; 160 MG/1; MG/1
1 TABLET ORAL 2 TIMES DAILY
Qty: 20 TABLET | Refills: 0 | Status: SHIPPED | OUTPATIENT
Start: 2024-04-17

## 2024-04-17 NOTE — TELEPHONE ENCOUNTER
I sent Bactrim to the pharmacy, patient can start taking that, patient was already on Macrobid on her medication list.

## 2024-04-17 NOTE — TELEPHONE ENCOUNTER
Pt called awaiting for medication to be called in to treat for infection.          Component    Specimen Description .CLEAN CATCH URINE   Culture ESCHERICHIA COLI >100,000 CFU/ML Abnormal    Resulting Agency [facility]        Susceptibility    Escherichia coli (1)    Antibiotic Interpretation Microscan Method Status    ampicillin Resistant >=32 BACTERIAL SUSCEPTIBILITY PANEL GUS Final    ceFAZolin Sensitive <=4 BACTERIAL SUSCEPTIBILITY PANEL GUS Final     Cefazolin sensitivity results can be used to predict the effectiveness of oral  cephalosporins (eg. Cephalexin) in uncomplicated Urinary Tract Infections due to E. coli, K.   pneumoniae, and P. mirabilis       cefTRIAXone Sensitive <=0.25 BACTERIAL SUSCEPTIBILITY PANEL GUS Final    Confirmatory Extended Spectrum Beta-Lactamase Sensitive NEGATIVE BACTERIAL SUSCEPTIBILITY PANEL GUS Final    gentamicin Sensitive <=1 BACTERIAL SUSCEPTIBILITY PANEL GUS Final    levofloxacin Sensitive <=0.12 BACTERIAL SUSCEPTIBILITY PANEL GUS Final    nitrofurantoin Sensitive <=16 BACTERIAL SUSCEPTIBILITY PANEL GUS Final    piperacillin-tazobactam Sensitive <=4 BACTERIAL SUSCEPTIBILITY PANEL GUS Final    tobramycin Sensitive <=1 BACTERIAL SUSCEPTIBILITY PANEL GUS Final    trimethoprim-sulfamethoxazole Sensitive <=20 BACTERIAL SUSCEPTIBILITY PANEL GUS F                   Please Approve or Refuse.  Send to Pharmacy per Pt's Request: [facility]     Next Visit Date:  7/12/2024   Last Visit Date: 3/21/2024    Hemoglobin A1C (%)   Date Value   03/21/2024 7.1   08/14/2023 6.6   04/12/2023 6.4             ( goal A1C is < 7)   BP Readings from Last 3 Encounters:   03/21/24 128/80   08/14/23 98/70   04/12/23 110/70          (goal 120/80)  BUN   Date Value Ref Range Status   04/13/2024 15 8 - 23 mg/dL Final     Creatinine   Date Value Ref Range Status   04/13/2024 0.6 0.5 - 0.9 mg/dL Final     Potassium   Date Value Ref Range Status   04/13/2024 4.7 3.7 - 5.3 mmol/L Final

## 2024-05-02 ENCOUNTER — TELEPHONE (OUTPATIENT)
Dept: FAMILY MEDICINE CLINIC | Age: 64
End: 2024-05-02

## 2024-05-02 DIAGNOSIS — E11.9 TYPE 2 DIABETES MELLITUS WITHOUT COMPLICATION, WITHOUT LONG-TERM CURRENT USE OF INSULIN (HCC): Primary | ICD-10-CM

## 2024-05-02 NOTE — TELEPHONE ENCOUNTER
1. Type 2 diabetes mellitus without complication, without long-term current use of insulin (HCC)    - blood glucose monitor kit and supplies; Test one time a day & as needed for symptoms of irregular blood glucose.  Dispense: 1 kit; Refill: 0

## 2024-05-02 NOTE — TELEPHONE ENCOUNTER
Will like a new script called in for a new glucometer kit along with new testing supplies.     Please Approve or Refuse.  Send to Pharmacy per Pt's Request: [facility] pharmacy she is requesting to call in      Next Visit Date:  7/12/2024   Last Visit Date: 3/21/2024    Hemoglobin A1C (%)   Date Value   03/21/2024 7.1   08/14/2023 6.6   04/12/2023 6.4             ( goal A1C is < 7)   BP Readings from Last 3 Encounters:   03/21/24 128/80   08/14/23 98/70   04/12/23 110/70          (goal 120/80)  BUN   Date Value Ref Range Status   04/13/2024 15 8 - 23 mg/dL Final     Creatinine   Date Value Ref Range Status   04/13/2024 0.6 0.5 - 0.9 mg/dL Final     Potassium   Date Value Ref Range Status   04/13/2024 4.7 3.7 - 5.3 mmol/L Final

## 2024-05-26 PROCEDURE — 96374 THER/PROPH/DIAG INJ IV PUSH: CPT

## 2024-05-26 PROCEDURE — 96375 TX/PRO/DX INJ NEW DRUG ADDON: CPT

## 2024-05-26 PROCEDURE — 99285 EMERGENCY DEPT VISIT HI MDM: CPT

## 2024-05-26 ASSESSMENT — PAIN DESCRIPTION - LOCATION: LOCATION: HEAD

## 2024-05-26 ASSESSMENT — LIFESTYLE VARIABLES
HOW OFTEN DO YOU HAVE A DRINK CONTAINING ALCOHOL: NEVER
HOW MANY STANDARD DRINKS CONTAINING ALCOHOL DO YOU HAVE ON A TYPICAL DAY: PATIENT DOES NOT DRINK

## 2024-05-26 ASSESSMENT — PAIN DESCRIPTION - DESCRIPTORS: DESCRIPTORS: THROBBING

## 2024-05-26 ASSESSMENT — PAIN SCALES - GENERAL: PAINLEVEL_OUTOF10: 8

## 2024-05-26 ASSESSMENT — PAIN - FUNCTIONAL ASSESSMENT: PAIN_FUNCTIONAL_ASSESSMENT: 0-10

## 2024-05-27 ENCOUNTER — HOSPITAL ENCOUNTER (EMERGENCY)
Age: 64
Discharge: HOME | End: 2024-05-27
Attending: STUDENT IN AN ORGANIZED HEALTH CARE EDUCATION/TRAINING PROGRAM
Payer: COMMERCIAL

## 2024-05-27 ENCOUNTER — APPOINTMENT (OUTPATIENT)
Dept: GENERAL RADIOLOGY | Age: 64
End: 2024-05-27
Payer: COMMERCIAL

## 2024-05-27 VITALS
WEIGHT: 190 LBS | OXYGEN SATURATION: 98 % | SYSTOLIC BLOOD PRESSURE: 111 MMHG | TEMPERATURE: 98.2 F | DIASTOLIC BLOOD PRESSURE: 65 MMHG | RESPIRATION RATE: 18 BRPM | BODY MASS INDEX: 37.3 KG/M2 | HEART RATE: 85 BPM | HEIGHT: 60 IN

## 2024-05-27 DIAGNOSIS — J06.9 ACUTE UPPER RESPIRATORY INFECTION: Primary | ICD-10-CM

## 2024-05-27 DIAGNOSIS — R51.9 NONINTRACTABLE HEADACHE, UNSPECIFIED CHRONICITY PATTERN, UNSPECIFIED HEADACHE TYPE: ICD-10-CM

## 2024-05-27 LAB
ALBUMIN SERPL-MCNC: 3.7 G/DL (ref 3.5–5.2)
ALP SERPL-CCNC: 101 U/L (ref 35–104)
ALT SERPL-CCNC: 8 U/L (ref 5–33)
ANION GAP SERPL CALCULATED.3IONS-SCNC: 14 MMOL/L (ref 9–17)
AST SERPL-CCNC: 13 U/L
BASOPHILS # BLD: 0.1 K/UL (ref 0–0.2)
BASOPHILS NFR BLD: 1 % (ref 0–2)
BILIRUB SERPL-MCNC: 0.5 MG/DL (ref 0.3–1.2)
BNP SERPL-MCNC: 57 PG/ML
BUN SERPL-MCNC: 9 MG/DL (ref 8–23)
CALCIUM SERPL-MCNC: 9.5 MG/DL (ref 8.6–10.4)
CHLORIDE SERPL-SCNC: 102 MMOL/L (ref 98–107)
CO2 SERPL-SCNC: 21 MMOL/L (ref 20–31)
CREAT SERPL-MCNC: 0.6 MG/DL (ref 0.5–0.9)
EOSINOPHIL # BLD: 0.1 K/UL (ref 0–0.4)
EOSINOPHILS RELATIVE PERCENT: 1 % (ref 0–4)
ERYTHROCYTE [DISTWIDTH] IN BLOOD BY AUTOMATED COUNT: 14.8 % (ref 11.5–14.9)
FLUAV RNA RESP QL NAA+PROBE: NOT DETECTED
FLUBV RNA RESP QL NAA+PROBE: NOT DETECTED
GFR, ESTIMATED: >90 ML/MIN/1.73M2
GLUCOSE SERPL-MCNC: 142 MG/DL (ref 70–99)
HCT VFR BLD AUTO: 40.6 % (ref 36–46)
HGB BLD-MCNC: 12.9 G/DL (ref 12–16)
LIPASE SERPL-CCNC: 29 U/L (ref 13–60)
LYMPHOCYTES NFR BLD: 1.5 K/UL (ref 1–4.8)
LYMPHOCYTES RELATIVE PERCENT: 13 % (ref 24–44)
MAGNESIUM SERPL-MCNC: 1.5 MG/DL (ref 1.6–2.6)
MCH RBC QN AUTO: 26.6 PG (ref 26–34)
MCHC RBC AUTO-ENTMCNC: 31.7 G/DL (ref 31–37)
MCV RBC AUTO: 83.8 FL (ref 80–100)
MONOCYTES NFR BLD: 0.8 K/UL (ref 0.1–1.3)
MONOCYTES NFR BLD: 7 % (ref 1–7)
NEUTROPHILS NFR BLD: 78 % (ref 36–66)
NEUTS SEG NFR BLD: 9.2 K/UL (ref 1.3–9.1)
PLATELET # BLD AUTO: 345 K/UL (ref 150–450)
PMV BLD AUTO: 8.2 FL (ref 6–12)
POTASSIUM SERPL-SCNC: 3.9 MMOL/L (ref 3.7–5.3)
PROT SERPL-MCNC: 7.5 G/DL (ref 6.4–8.3)
RBC # BLD AUTO: 4.85 M/UL (ref 4–5.2)
SARS-COV-2 RNA RESP QL NAA+PROBE: NOT DETECTED
SODIUM SERPL-SCNC: 137 MMOL/L (ref 135–144)
SOURCE: NORMAL
SPECIMEN DESCRIPTION: NORMAL
TROPONIN I SERPL HS-MCNC: 10 NG/L (ref 0–14)
WBC OTHER # BLD: 11.7 K/UL (ref 3.5–11)

## 2024-05-27 PROCEDURE — 87636 SARSCOV2 & INF A&B AMP PRB: CPT

## 2024-05-27 PROCEDURE — 84484 ASSAY OF TROPONIN QUANT: CPT

## 2024-05-27 PROCEDURE — 96374 THER/PROPH/DIAG INJ IV PUSH: CPT

## 2024-05-27 PROCEDURE — 83735 ASSAY OF MAGNESIUM: CPT

## 2024-05-27 PROCEDURE — 36415 COLL VENOUS BLD VENIPUNCTURE: CPT

## 2024-05-27 PROCEDURE — 96375 TX/PRO/DX INJ NEW DRUG ADDON: CPT

## 2024-05-27 PROCEDURE — 83880 ASSAY OF NATRIURETIC PEPTIDE: CPT

## 2024-05-27 PROCEDURE — 85025 COMPLETE CBC W/AUTO DIFF WBC: CPT

## 2024-05-27 PROCEDURE — 6360000002 HC RX W HCPCS: Performed by: STUDENT IN AN ORGANIZED HEALTH CARE EDUCATION/TRAINING PROGRAM

## 2024-05-27 PROCEDURE — 83690 ASSAY OF LIPASE: CPT

## 2024-05-27 PROCEDURE — 80053 COMPREHEN METABOLIC PANEL: CPT

## 2024-05-27 PROCEDURE — 71045 X-RAY EXAM CHEST 1 VIEW: CPT

## 2024-05-27 RX ORDER — IBUPROFEN 600 MG/1
600 TABLET ORAL EVERY 6 HOURS PRN
Qty: 120 TABLET | Refills: 0 | Status: SHIPPED | OUTPATIENT
Start: 2024-05-27

## 2024-05-27 RX ORDER — ACETAMINOPHEN 325 MG/1
650 TABLET ORAL EVERY 6 HOURS PRN
Qty: 40 TABLET | Refills: 0 | Status: SHIPPED | OUTPATIENT
Start: 2024-05-27

## 2024-05-27 RX ORDER — KETOROLAC TROMETHAMINE 30 MG/ML
15 INJECTION, SOLUTION INTRAMUSCULAR; INTRAVENOUS ONCE
Status: COMPLETED | OUTPATIENT
Start: 2024-05-27 | End: 2024-05-27

## 2024-05-27 RX ORDER — METOCLOPRAMIDE HYDROCHLORIDE 5 MG/ML
10 INJECTION INTRAMUSCULAR; INTRAVENOUS ONCE
Status: COMPLETED | OUTPATIENT
Start: 2024-05-27 | End: 2024-05-27

## 2024-05-27 RX ADMIN — METOCLOPRAMIDE 10 MG: 5 INJECTION, SOLUTION INTRAMUSCULAR; INTRAVENOUS at 01:47

## 2024-05-27 RX ADMIN — KETOROLAC TROMETHAMINE 15 MG: 30 INJECTION, SOLUTION INTRAMUSCULAR at 01:47

## 2024-05-27 ASSESSMENT — ENCOUNTER SYMPTOMS
VOMITING: 0
EYE REDNESS: 0
NAUSEA: 0
DIARRHEA: 0
RHINORRHEA: 0
SORE THROAT: 0
SHORTNESS OF BREATH: 1
EYE DISCHARGE: 0
ABDOMINAL PAIN: 0

## 2024-05-27 ASSESSMENT — PAIN DESCRIPTION - LOCATION: LOCATION: HEAD

## 2024-05-27 ASSESSMENT — PAIN DESCRIPTION - DESCRIPTORS: DESCRIPTORS: THROBBING

## 2024-05-27 ASSESSMENT — PAIN SCALES - GENERAL
PAINLEVEL_OUTOF10: 3
PAINLEVEL_OUTOF10: 8

## 2024-05-27 ASSESSMENT — PAIN DESCRIPTION - PAIN TYPE: TYPE: ACUTE PAIN

## 2024-05-27 NOTE — ED PROVIDER NOTES
EMERGENCY DEPARTMENT ENCOUNTER    Pt Name: Hunter Yang  MRN: [id number]  Birthdate 1960  Date of evaluation: 5/27/24  CHIEF COMPLAINT       Chief Complaint   Patient presents with    Headache     X 1 week, frontal headache \"throbbing and intermittent\"    Cough    Nasal Congestion     X 1 week    Shortness of Breath     HISTORY OF PRESENT ILLNESS   63-year-old history of hypertension diabetes presenting with headache and shortness of breath    States for the last week she has had a headache every day    No numbness tingling weakness neck pain or stiffness    She also has been feeling short of breath    No chest pain or pressure    No leg swelling recent travel or surgery no history of blood clots no fevers or chills no productive cough              REVIEW OF SYSTEMS     Review of Systems   Constitutional:  Negative for chills and fever.   HENT:  Negative for rhinorrhea and sore throat.    Eyes:  Negative for discharge and redness.   Respiratory:  Positive for shortness of breath.    Cardiovascular:  Negative for chest pain.   Gastrointestinal:  Negative for abdominal pain, diarrhea, nausea and vomiting.   Genitourinary:  Negative for dysuria, frequency and urgency.   Musculoskeletal:  Negative for arthralgias and myalgias.   Skin:  Negative for rash.   Neurological:  Positive for headaches. Negative for weakness and numbness.   Psychiatric/Behavioral:  Negative for suicidal ideas.    All other systems reviewed and are negative.    PASTMEDICAL HISTORY     Past Medical History:   Diagnosis Date    Diabetes mellitus (HCC)     Hypertension      Past Problem List  Patient Active Problem List   Diagnosis Code    Type 2 diabetes mellitus without complication, without long-term current use of insulin (HCC) E11.9    Essential hypertension I10    Nonischemic dilated cardiomyopathy (HCC) I42.0    Mixed hyperlipidemia E78.2    Vitamin D deficiency E55.9    Nail fungal infection B35.1    Class 2 severe obesity due to excess

## 2024-05-27 NOTE — ED TRIAGE NOTES
Mode of arrival (squad #, walk in, police, etc) : walk-in        Chief complaint(s): headache, cough, nasal congestion, SOB        Arrival Note (brief scenario, treatment PTA, etc).: Patient reports the above symptoms started 1 week ago.        C= \"Have you ever felt that you should Cut down on your drinking?\"  No  A= \"Have people Annoyed you by criticizing your drinking?\"  No  G= \"Have you ever felt bad or Guilty about your drinking?\"  No  E= \"Have you ever had a drink as an Eye-opener first thing in the morning to steady your nerves or to help a hangover?\"  No      Deferred []      Reason for deferring: N/A    *If yes to two or more: probable alcohol abuse.*

## 2024-05-28 LAB
EKG ATRIAL RATE: 83 BPM
EKG P AXIS: 8 DEGREES
EKG P-R INTERVAL: 176 MS
EKG Q-T INTERVAL: 400 MS
EKG QRS DURATION: 124 MS
EKG QTC CALCULATION (BAZETT): 470 MS
EKG R AXIS: 36 DEGREES
EKG T AXIS: 12 DEGREES
EKG VENTRICULAR RATE: 83 BPM

## 2024-05-31 ENCOUNTER — TELEPHONE (OUTPATIENT)
Dept: FAMILY MEDICINE CLINIC | Age: 64
End: 2024-05-31

## 2024-05-31 NOTE — TELEPHONE ENCOUNTER
[facility] ED Follow up Call    Reason for ED visit:      5/27/2024 (2 hours)  [facility] ED     Los Bowers MD  Last attending  Treatment team Acute upper respiratory infection +1 more  Clinical impression Headache   Cough  Nasal Congestion   Shortness of Breath  Chief complaint              Hi Hunter , this is PATTI from Dominique Haley's office, just calling to see how you are doing after your recent ED visit.    Did you receive discharge instructions?  Yes  Do you understand the discharge instructions? Yes  Did the ED give you any new prescriptions? Yes  Were you able to fill your prescriptions? Yes      Do you have one of our red, yellow and green  Zone sheets that help you to determine when you should go to the ED?Yes      Do you need or want to make a follow up appt with your PCP? PT DECLINED TO SCHEDULE ED FOLLOW UP    Do you have any further needs in the home i.e. Equipment?  No        FU appts/Provider:    Future Appointments   Date Time Provider Department Center   7/12/2024  1:15 PM Dominique Viveros MD [id number]

## 2024-07-02 RX ORDER — CARVEDILOL 12.5 MG/1
12.5 TABLET ORAL 2 TIMES DAILY WITH MEALS
Qty: 180 TABLET | Refills: 0 | Status: SHIPPED | OUTPATIENT
Start: 2024-07-02

## 2024-07-17 ENCOUNTER — OFFICE VISIT (OUTPATIENT)
Dept: FAMILY MEDICINE CLINIC | Age: 64
End: 2024-07-17
Payer: COMMERCIAL

## 2024-07-17 VITALS
HEART RATE: 82 BPM | TEMPERATURE: 98.2 F | SYSTOLIC BLOOD PRESSURE: 106 MMHG | DIASTOLIC BLOOD PRESSURE: 78 MMHG | HEIGHT: 60 IN | BODY MASS INDEX: 36.95 KG/M2 | WEIGHT: 188.2 LBS | OXYGEN SATURATION: 96 %

## 2024-07-17 DIAGNOSIS — I10 ESSENTIAL HYPERTENSION: ICD-10-CM

## 2024-07-17 DIAGNOSIS — E11.9 TYPE 2 DIABETES MELLITUS WITHOUT COMPLICATION, WITHOUT LONG-TERM CURRENT USE OF INSULIN (HCC): Primary | ICD-10-CM

## 2024-07-17 DIAGNOSIS — Z12.31 SCREENING MAMMOGRAM FOR HIGH-RISK PATIENT: ICD-10-CM

## 2024-07-17 DIAGNOSIS — E78.2 MIXED HYPERLIPIDEMIA: ICD-10-CM

## 2024-07-17 DIAGNOSIS — E79.0 HYPERURICEMIA: ICD-10-CM

## 2024-07-17 DIAGNOSIS — I42.0 NONISCHEMIC DILATED CARDIOMYOPATHY (HCC): ICD-10-CM

## 2024-07-17 PROBLEM — N30.00 ACUTE CYSTITIS: Status: ACTIVE | Noted: 2024-03-07

## 2024-07-17 PROBLEM — R06.09 DYSPNEA ON EXERTION: Status: RESOLVED | Noted: 2019-08-15 | Resolved: 2024-07-17

## 2024-07-17 PROBLEM — Q21.10 ATRIAL SEPTAL DEFECT: Status: ACTIVE | Noted: 2022-12-15

## 2024-07-17 PROBLEM — R30.0 BURNING WITH URINATION: Status: ACTIVE | Noted: 2024-03-07

## 2024-07-17 PROBLEM — R06.09 DYSPNEA ON EXERTION: Status: ACTIVE | Noted: 2019-08-15

## 2024-07-17 PROBLEM — N30.00 ACUTE CYSTITIS: Status: RESOLVED | Noted: 2024-03-07 | Resolved: 2024-07-17

## 2024-07-17 LAB — HBA1C MFR BLD: 7.3 %

## 2024-07-17 PROCEDURE — 99214 OFFICE O/P EST MOD 30 MIN: CPT | Performed by: FAMILY MEDICINE

## 2024-07-17 PROCEDURE — 3078F DIAST BP <80 MM HG: CPT | Performed by: FAMILY MEDICINE

## 2024-07-17 PROCEDURE — 83036 HEMOGLOBIN GLYCOSYLATED A1C: CPT | Performed by: FAMILY MEDICINE

## 2024-07-17 PROCEDURE — 3074F SYST BP LT 130 MM HG: CPT | Performed by: FAMILY MEDICINE

## 2024-07-17 PROCEDURE — 3051F HG A1C>EQUAL 7.0%<8.0%: CPT | Performed by: FAMILY MEDICINE

## 2024-07-17 RX ORDER — BLOOD-GLUCOSE METER
1 KIT MISCELLANEOUS DAILY
COMMUNITY
Start: 2024-05-03

## 2024-07-17 RX ORDER — GLIPIZIDE 5 MG/1
5 TABLET ORAL DAILY
Qty: 60 TABLET | Refills: 3 | Status: SHIPPED | OUTPATIENT
Start: 2024-07-17

## 2024-07-17 RX ORDER — DAPAGLIFLOZIN 5 MG/1
5 TABLET, FILM COATED ORAL EVERY MORNING
COMMUNITY
Start: 2024-06-08 | End: 2024-07-17 | Stop reason: SINTOL

## 2024-07-17 SDOH — ECONOMIC STABILITY: FOOD INSECURITY: WITHIN THE PAST 12 MONTHS, THE FOOD YOU BOUGHT JUST DIDN'T LAST AND YOU DIDN'T HAVE MONEY TO GET MORE.: NEVER TRUE

## 2024-07-17 SDOH — ECONOMIC STABILITY: FOOD INSECURITY: WITHIN THE PAST 12 MONTHS, YOU WORRIED THAT YOUR FOOD WOULD RUN OUT BEFORE YOU GOT MONEY TO BUY MORE.: NEVER TRUE

## 2024-07-17 SDOH — ECONOMIC STABILITY: INCOME INSECURITY: HOW HARD IS IT FOR YOU TO PAY FOR THE VERY BASICS LIKE FOOD, HOUSING, MEDICAL CARE, AND HEATING?: NOT HARD AT ALL

## 2024-07-17 ASSESSMENT — PATIENT HEALTH QUESTIONNAIRE - PHQ9
SUM OF ALL RESPONSES TO PHQ QUESTIONS 1-9: 0
2. FEELING DOWN, DEPRESSED OR HOPELESS: NOT AT ALL
SUM OF ALL RESPONSES TO PHQ9 QUESTIONS 1 & 2: 0
SUM OF ALL RESPONSES TO PHQ QUESTIONS 1-9: 0
1. LITTLE INTEREST OR PLEASURE IN DOING THINGS: NOT AT ALL
SUM OF ALL RESPONSES TO PHQ QUESTIONS 1-9: 0
SUM OF ALL RESPONSES TO PHQ QUESTIONS 1-9: 0

## 2024-07-17 ASSESSMENT — ENCOUNTER SYMPTOMS
NAUSEA: 0
EYE REDNESS: 0
SORE THROAT: 0
SINUS PRESSURE: 0
STRIDOR: 0
COLOR CHANGE: 0
WHEEZING: 0
ABDOMINAL DISTENTION: 0
ABDOMINAL PAIN: 0
BACK PAIN: 1
TROUBLE SWALLOWING: 0
COUGH: 0
RECTAL PAIN: 0
CHEST TIGHTNESS: 0
CONSTIPATION: 0
VOMITING: 0
SHORTNESS OF BREATH: 0
RHINORRHEA: 0
BLOOD IN STOOL: 0
DIARRHEA: 0

## 2024-07-17 NOTE — PROGRESS NOTES
Chief Complaint   Patient presents with    Diabetes    Hypertension    Hyperlipidemia         Hunter Yang  here today for follow up on chronic medical problems, go over labs and/or diagnostic studies, and medication refills. Diabetes, Hypertension, and Hyperlipidemia      HPI: Patient is scheduled for follow-up on chronic medical conditions and to discuss blood work.    Type 2 diabetes, controlled but A1c slightly increased to 7.3.  Patient's previous A1c was 7.1.  Patient is currently on Farxiga, glipizide and metformin.  Patient reports compliance with medications, but complains of side effects related to Farxiga.  Patient reports she gets recurrent dental infections and she is not able to tolerate.  Patient wants to stop Farxiga.  Patient reports that was started by cardiologist.    Patient has nonischemic dilated cardiomyopathy CHF.  Patient is currently on Entresto, beta-blockers reports compliance.  Patient denies any side effects from medication denies any recent leg swelling shortness of breath and dyspnea on exertion.  Patient reports she has appointment with cardiologist.    Hypertension well-controlled, is on multiple medications including beta-blockers ACE inhibitors and diuretics.  Patient is up-to-date on blood work, kidney function within normal range.    Hyperuricemia, improved on recent blood work.     Hyperlipidemia, stable on recent blood work.  Patient is currently on statins reports compliance denies any side effects.        /78   Pulse 82   Temp 98.2 °F (36.8 °C)   Ht 1.524 m (5')   Wt 85.4 kg (188 lb 3.2 oz)   SpO2 96%   BMI 36.76 kg/m²    Body mass index is 36.76 kg/m².  Wt Readings from Last 3 Encounters:   07/17/24 85.4 kg (188 lb 3.2 oz)   05/26/24 86.2 kg (190 lb)   03/21/24 86.6 kg (191 lb)        [x]Negative depression screening.      7/17/2024     2:52 PM 3/21/2024     2:46 PM 8/14/2023     2:18 PM 1/12/2023    12:21 PM 4/1/2022     3:22 PM 11/19/2021    11:37 AM 1/8/2021

## 2024-07-17 NOTE — PROGRESS NOTES
Visit Information    Have you changed or started any medications since your last visit including any over-the-counter medicines, vitamins, or herbal medicines? no   Have you stopped taking any of your medications? Is so, why? -  no  Are you having any side effects from any of your medications? - no    Have you seen any other physician or provider since your last visit?  no   Have you had any other diagnostic tests since your last visit?  no   Have you been seen in the emergency room and/or had an admission in a hospital since we last saw you?  yes -    Have you had your routine dental cleaning in the past 6 months?  no     Do you have an active MyChart account? If no, what is the barrier?  Yes    Patient Care Team:  Dominique Viveros MD as PCP - General (Family Medicine)  Dominique Viveros MD as PCP - Empaneled Provider  Dima Otero MD as Consulting Physician (Cardiology)    Medical History Review  Past Medical, Family, and Social History reviewed and does contribute to the patient presenting condition    Health Maintenance   Topic Date Due    Diabetic retinal exam  02/22/2020    Respiratory Syncytial Virus (RSV) Pregnant or age 60 yrs+ (1 - 1-dose 60+ series) Never done    Pneumococcal 0-64 years Vaccine (2 of 2 - PCV) 08/07/2021    COVID-19 Vaccine (4 - 2023-24 season) 09/01/2023    Diabetic foot exam  01/12/2024    Flu vaccine (1) 08/01/2024    A1C test (Diabetic or Prediabetic)  03/21/2025    Depression Screen  03/21/2025    Diabetic Alb to Cr ratio (uACR) test  04/13/2025    Lipids  04/13/2025    GFR test (Diabetes, CKD 3-4, OR last GFR 15-59)  05/27/2025    Breast cancer screen  08/12/2025    Cervical cancer screen  03/21/2027    Colorectal Cancer Screen  10/12/2028    DTaP/Tdap/Td vaccine (2 - Td or Tdap) 04/01/2032    Shingles vaccine  Completed    Hepatitis C screen  Completed    HIV screen  Completed    Hepatitis A vaccine  Aged Out    Hepatitis B vaccine  Aged Out    Hib vaccine  Aged Out    Polio

## 2024-10-22 RX ORDER — CARVEDILOL 12.5 MG/1
12.5 TABLET ORAL 2 TIMES DAILY WITH MEALS
Qty: 180 TABLET | Refills: 0 | Status: SHIPPED | OUTPATIENT
Start: 2024-10-22

## 2024-10-22 RX ORDER — ATORVASTATIN CALCIUM 20 MG/1
20 TABLET, FILM COATED ORAL DAILY
Qty: 90 TABLET | Refills: 1 | Status: SHIPPED | OUTPATIENT
Start: 2024-10-22

## 2024-10-22 NOTE — TELEPHONE ENCOUNTER
Please Approve or Refuse.  Send to Pharmacy per Pt's Request:      Next Visit Date:  Visit date not found   Last Visit Date: 7/17/2024    Hemoglobin A1C (%)   Date Value   07/17/2024 7.3 (A)   03/21/2024 7.1   08/14/2023 6.6             ( goal A1C is < 7)   BP Readings from Last 3 Encounters:   07/17/24 106/78   05/27/24 111/65   03/21/24 128/80          (goal 120/80)  BUN   Date Value Ref Range Status   05/27/2024 9 8 - 23 mg/dL Final     Creatinine   Date Value Ref Range Status   05/27/2024 0.6 0.5 - 0.9 mg/dL Final     Potassium   Date Value Ref Range Status   05/27/2024 3.9 3.7 - 5.3 mmol/L Final

## 2024-11-08 ENCOUNTER — OFFICE VISIT (OUTPATIENT)
Dept: FAMILY MEDICINE CLINIC | Age: 64
End: 2024-11-08

## 2024-11-08 VITALS
BODY MASS INDEX: 36.91 KG/M2 | WEIGHT: 188 LBS | HEART RATE: 80 BPM | SYSTOLIC BLOOD PRESSURE: 108 MMHG | OXYGEN SATURATION: 96 % | HEIGHT: 60 IN | DIASTOLIC BLOOD PRESSURE: 60 MMHG

## 2024-11-08 DIAGNOSIS — E79.0 HYPERURICEMIA: ICD-10-CM

## 2024-11-08 DIAGNOSIS — I42.0 NONISCHEMIC DILATED CARDIOMYOPATHY (HCC): ICD-10-CM

## 2024-11-08 DIAGNOSIS — I50.22 CHRONIC SYSTOLIC (CONGESTIVE) HEART FAILURE (HCC): ICD-10-CM

## 2024-11-08 DIAGNOSIS — Z23 ENCOUNTER FOR IMMUNIZATION: ICD-10-CM

## 2024-11-08 DIAGNOSIS — E78.2 MIXED HYPERLIPIDEMIA: ICD-10-CM

## 2024-11-08 DIAGNOSIS — I10 ESSENTIAL HYPERTENSION: ICD-10-CM

## 2024-11-08 DIAGNOSIS — E11.9 TYPE 2 DIABETES MELLITUS WITHOUT COMPLICATION, WITHOUT LONG-TERM CURRENT USE OF INSULIN (HCC): Primary | ICD-10-CM

## 2024-11-08 PROBLEM — B35.1 NAIL FUNGAL INFECTION: Status: RESOLVED | Noted: 2020-08-07 | Resolved: 2024-11-08

## 2024-11-08 PROBLEM — R30.0 BURNING WITH URINATION: Status: RESOLVED | Noted: 2024-03-07 | Resolved: 2024-11-08

## 2024-11-08 RX ORDER — FUROSEMIDE 20 MG/1
20 TABLET ORAL DAILY
Qty: 4 TABLET | Refills: 0 | Status: SHIPPED | OUTPATIENT
Start: 2024-11-08 | End: 2024-11-10

## 2024-11-08 ASSESSMENT — ENCOUNTER SYMPTOMS
TROUBLE SWALLOWING: 0
STRIDOR: 0
SORE THROAT: 0
RECTAL PAIN: 0
ABDOMINAL DISTENTION: 0
SINUS PRESSURE: 0
COUGH: 0
BLOOD IN STOOL: 0
CHEST TIGHTNESS: 0
SHORTNESS OF BREATH: 0
VOMITING: 0
NAUSEA: 0
BACK PAIN: 0
COLOR CHANGE: 0
EYE REDNESS: 0
CONSTIPATION: 0
WHEEZING: 0
ABDOMINAL PAIN: 0
DIARRHEA: 0
RHINORRHEA: 0

## 2024-11-08 NOTE — PROGRESS NOTES
Visit Information    Have you changed or started any medications since your last visit including any over-the-counter medicines, vitamins, or herbal medicines? yes -     Have you stopped taking any of your medications? Is so, why? -  no  Are you having any side effects from any of your medications? - no    Have you seen any other physician or provider since your last visit?  no   Have you had any other diagnostic tests since your last visit?  no   Have you been seen in the emergency room and/or had an admission in a hospital since we last saw you?  no   Have you had your routine dental cleaning in the past 6 months?  no     Do you have an active MyChart account? If no, what is the barrier?  Yes    Patient Care Team:  Dominique Viveros MD as PCP - General (Family Medicine)  Dominique Viveros MD as PCP - Empaneled Provider  Dima Otero MD as Consulting Physician (Cardiology)    Medical History Review  Past Medical, Family, and Social History reviewed and does contribute to the patient presenting condition    Health Maintenance   Topic Date Due    Diabetic retinal exam  02/22/2020    Respiratory Syncytial Virus (RSV) Pregnant or age 60 yrs+ (1 - 1-dose 60+ series) Never done    Pneumococcal 0-64 years Vaccine (2 of 2 - PCV) 08/07/2021    Flu vaccine (1) 08/01/2024    COVID-19 Vaccine (4 - 2023-24 season) 09/01/2024    Diabetic Alb to Cr ratio (uACR) test  04/13/2025    Lipids  04/13/2025    GFR test (Diabetes, CKD 3-4, OR last GFR 15-59)  05/27/2025    Diabetic foot exam  07/17/2025    A1C test (Diabetic or Prediabetic)  07/17/2025    Depression Screen  07/17/2025    Breast cancer screen  08/12/2025    Cervical cancer screen  03/21/2027    Colorectal Cancer Screen  10/12/2028    DTaP/Tdap/Td vaccine (2 - Td or Tdap) 04/01/2032    Shingles vaccine  Completed    Hepatitis C screen  Completed    HIV screen  Completed    Hepatitis A vaccine  Aged Out    Hepatitis B vaccine  Aged Out    Hib vaccine  Aged Out    Polio 
Future     Standing Expiration Date:   11/8/2025    CBC with Auto Differential     Standing Status:   Future     Standing Expiration Date:   11/9/2025    POCT glycosylated hemoglobin (Hb A1C)         There are no discontinued medications.    Hunter received counseling on the following healthy behaviors: nutrition, exercise, and medication adherence  Reviewed prior labs and health maintenance  Continue current medications, diet and exercise.  Discussed use, benefit, and side effects of prescribed medications. Barriers to medication compliance addressed.   Patient given educational materials - see patient instructions  Was a self-tracking handout given in paper form or via [employer]? Yes    Requested Prescriptions     Signed Prescriptions Disp Refills    furosemide (LASIX) 20 MG tablet 4 tablet 0     Sig: Take 1 tablet by mouth daily for 4 days       All patient questions answered.  Patient voiced understanding.    Quality Measures    Body mass index is 36.72 kg/m². Elevated. Weight control planned discussed daily exercise regimen and Healthy diet and regular exercise.    BP: 108/60 Blood pressure is normal. Treatment plan consists of Weight Reduction, DASH Eating Plan, Dietary Sodium Restriction, Increased Physical Activity, and No treatment change needed.    No results found for: \"LDLDIRECT\" (goal LDL reduction with dx if diabetes is 50% LDL reduction)          7/17/2024     2:52 PM 3/21/2024     2:46 PM 8/14/2023     2:18 PM 1/12/2023    12:21 PM 4/1/2022     3:22 PM 11/19/2021    11:37 AM 1/8/2021    11:56 AM   PHQ Scores   PHQ2 Score 0 0 0 0 0 0 0   PHQ9 Score 0 0 0 0 0 0 0     Interpretation of Total Score Depression Severity: 1-4 = Minimal depression, 5-9 = Mild depression, 10-14 = Moderate depression, 15-19 = Moderately severe depression, 20-27 = Severe depression    The patient'spast medical, surgical, social, and family history as well as her   current medications and allergies were reviewed as documented in

## 2024-11-09 DIAGNOSIS — I50.22 CHRONIC SYSTOLIC (CONGESTIVE) HEART FAILURE (HCC): ICD-10-CM

## 2024-11-09 NOTE — TELEPHONE ENCOUNTER
Please Approve or Refuse.  Send to Pharmacy per Pt's Request:      Next Visit Date:  2/18/2025   Last Visit Date: 11/8/2024    Hemoglobin A1C (%)   Date Value   07/17/2024 7.3 (A)   03/21/2024 7.1   08/14/2023 6.6             ( goal A1C is < 7)   BP Readings from Last 3 Encounters:   11/08/24 108/60   07/17/24 106/78   05/27/24 111/65          (goal 120/80)  BUN   Date Value Ref Range Status   05/27/2024 9 8 - 23 mg/dL Final     Creatinine   Date Value Ref Range Status   05/27/2024 0.6 0.5 - 0.9 mg/dL Final     Potassium   Date Value Ref Range Status   05/27/2024 3.9 3.7 - 5.3 mmol/L Final

## 2024-11-10 DIAGNOSIS — I50.22 CHRONIC SYSTOLIC (CONGESTIVE) HEART FAILURE (HCC): ICD-10-CM

## 2024-11-10 RX ORDER — FUROSEMIDE 20 MG/1
20 TABLET ORAL DAILY
Qty: 4 TABLET | Refills: 0 | Status: SHIPPED | OUTPATIENT
Start: 2024-11-10 | End: 2024-11-14

## 2024-11-11 DIAGNOSIS — I50.22 CHRONIC SYSTOLIC (CONGESTIVE) HEART FAILURE (HCC): ICD-10-CM

## 2024-11-11 RX ORDER — FUROSEMIDE 20 MG/1
20 TABLET ORAL DAILY
Qty: 4 TABLET | Refills: 0 | OUTPATIENT
Start: 2024-11-11 | End: 2024-11-15

## 2024-11-11 RX ORDER — FUROSEMIDE 20 MG/1
20 TABLET ORAL DAILY
Qty: 4 TABLET | Refills: 0 | Status: SHIPPED | OUTPATIENT
Start: 2024-11-11 | End: 2024-11-15

## 2024-11-11 NOTE — TELEPHONE ENCOUNTER
Please Approve or Refuse.  Send to Pharmacy per Pt's Request: sari      Next Visit Date:  2/18/2025   Last Visit Date: 11/8/2024    Hemoglobin A1C (%)   Date Value   07/17/2024 7.3 (A)   03/21/2024 7.1   08/14/2023 6.6             ( goal A1C is < 7)   BP Readings from Last 3 Encounters:   11/08/24 108/60   07/17/24 106/78   05/27/24 111/65          (goal 120/80)  BUN   Date Value Ref Range Status   05/27/2024 9 8 - 23 mg/dL Final     Creatinine   Date Value Ref Range Status   05/27/2024 0.6 0.5 - 0.9 mg/dL Final     Potassium   Date Value Ref Range Status   05/27/2024 3.9 3.7 - 5.3 mmol/L Final

## 2024-11-11 NOTE — TELEPHONE ENCOUNTER
Please Approve or Refuse.  Send to Pharmacy per Pt's Request: sari      Next Visit Date:  11/11/2024   Last Visit Date: 11/8/2024    Hemoglobin A1C (%)   Date Value   07/17/2024 7.3 (A)   03/21/2024 7.1   08/14/2023 6.6             ( goal A1C is < 7)   BP Readings from Last 3 Encounters:   11/08/24 108/60   07/17/24 106/78   05/27/24 111/65          (goal 120/80)  BUN   Date Value Ref Range Status   05/27/2024 9 8 - 23 mg/dL Final     Creatinine   Date Value Ref Range Status   05/27/2024 0.6 0.5 - 0.9 mg/dL Final     Potassium   Date Value Ref Range Status   05/27/2024 3.9 3.7 - 5.3 mmol/L Final

## 2024-11-12 DIAGNOSIS — I50.22 CHRONIC SYSTOLIC (CONGESTIVE) HEART FAILURE (HCC): ICD-10-CM

## 2024-11-12 RX ORDER — FUROSEMIDE 20 MG/1
20 TABLET ORAL DAILY
Qty: 4 TABLET | Refills: 0 | OUTPATIENT
Start: 2024-11-12 | End: 2024-11-16

## 2024-11-15 NOTE — TELEPHONE ENCOUNTER
"  Assessment & Plan     Bowel habit changes  - currently no issues with bowels, only after binge drinking that she does 2-3 times per month  - offered sobriety options, patient declines today   - Comprehensive metabolic panel  - Comprehensive metabolic panel    Alcohol consumption binge drinking  - if altered liver function, obtain outpatient ultrasound and continual reiteration of sobriety discussion   - Comprehensive metabolic panel  - Comprehensive metabolic panel    Menorrhagia with regular cycle  - interested in mirena IUD, will think about this and call to let us know if she would like placement so we can order the IUD   - IUD informational print out provided to patient today to take home to review as well             BMI  Estimated body mass index is 35.45 kg/m  as calculated from the following:    Height as of 12/21/23: 1.689 m (5' 6.5\").    Weight as of this encounter: 101.2 kg (223 lb).         See Patient Instructions    No follow-ups on file.    Jennifer Rg is a 35 year old, presenting for the following health issues:  RECHECK    HPI     1.) birth contrrol and lab work: patient interested in IUD.  She is coping with intermittent binge drinking and notices changes to her stool after a night of binge drinking 10 drinks is her usual (forgot to ask what kind of alcohol, but patient returning for IUD placement and will discuss further).      2.) discuss IUD options - menorrhagia, not sexually active, no concerns for pregnancy or STIs.  Had the copper IUD in place for 3 months with heavy bleeding so had this removed.  Possibly interested in hormonal IUD          Review of Systems  Constitutional, HEENT, cardiovascular, pulmonary, gi and gu systems are negative, except as otherwise noted.      Objective    /65   Pulse 72   Wt 101.2 kg (223 lb)   SpO2 99%   BMI 35.45 kg/m    Body mass index is 35.45 kg/m .  Physical Exam   GENERAL: alert and no distress  RESP: lungs clear to auscultation - no " F.y.I is this something you can do for that visit.     Future Appointments   Date Time Provider Department Center   3/21/2024  2:45 PM Dominique Viveros MD [facility]       rales, rhonchi or wheezes  CV: regular rate and rhythm, normal S1 S2, no S3 or S4, no murmur, click or rub, no peripheral edema  ABDOMEN: hepatomegaly - mild and mild discomfort with palpation, otherwise soft, nontender, without hepatosplenomegaly or masses, bowel sounds normal  MS: no gross musculoskeletal defects noted, no edema    No results found for this or any previous visit (from the past 24 hours).        Signed Electronically by: NANDA Singer CNP

## 2024-11-22 ENCOUNTER — HOSPITAL ENCOUNTER (OUTPATIENT)
Age: 64
Discharge: HOME | End: 2024-11-22
Payer: COMMERCIAL

## 2024-11-22 LAB
BASOPHILS # BLD: 0.1 K/UL (ref 0–0.2)
BASOPHILS NFR BLD: 1 % (ref 0–2)
EOSINOPHIL # BLD: 0.3 K/UL (ref 0–0.4)
EOSINOPHILS RELATIVE PERCENT: 4 % (ref 0–4)
ERYTHROCYTE [DISTWIDTH] IN BLOOD BY AUTOMATED COUNT: 15.5 % (ref 11.5–14.9)
HCT VFR BLD AUTO: 42.5 % (ref 36–46)
HGB BLD-MCNC: 13.8 G/DL (ref 12–16)
LYMPHOCYTES NFR BLD: 2.2 K/UL (ref 1–4.8)
LYMPHOCYTES RELATIVE PERCENT: 32 % (ref 24–44)
MAGNESIUM SERPL-MCNC: 2 MG/DL (ref 1.6–2.4)
MCH RBC QN AUTO: 27.7 PG (ref 26–34)
MCHC RBC AUTO-ENTMCNC: 32.4 G/DL (ref 31–37)
MCV RBC AUTO: 85.4 FL (ref 80–100)
MONOCYTES NFR BLD: 0.4 K/UL (ref 0.1–1.3)
MONOCYTES NFR BLD: 6 % (ref 1–7)
NEUTROPHILS NFR BLD: 57 % (ref 36–66)
NEUTS SEG NFR BLD: 3.9 K/UL (ref 1.3–9.1)
PLATELET # BLD AUTO: 284 K/UL (ref 150–450)
PMV BLD AUTO: 8.4 FL (ref 6–12)
RBC # BLD AUTO: 4.98 M/UL (ref 4–5.2)
WBC OTHER # BLD: 6.9 K/UL (ref 3.5–11)

## 2024-11-22 PROCEDURE — 85025 COMPLETE CBC W/AUTO DIFF WBC: CPT

## 2024-11-22 PROCEDURE — 36415 COLL VENOUS BLD VENIPUNCTURE: CPT

## 2024-11-22 PROCEDURE — 83735 ASSAY OF MAGNESIUM: CPT

## 2024-12-01 ENCOUNTER — APPOINTMENT (OUTPATIENT)
Dept: CT IMAGING | Age: 64
End: 2024-12-01
Payer: OTHER MISCELLANEOUS

## 2024-12-01 ENCOUNTER — APPOINTMENT (OUTPATIENT)
Dept: GENERAL RADIOLOGY | Age: 64
End: 2024-12-01
Attending: EMERGENCY MEDICINE
Payer: OTHER MISCELLANEOUS

## 2024-12-01 ENCOUNTER — HOSPITAL ENCOUNTER (EMERGENCY)
Age: 64
Discharge: HOME | End: 2024-12-01
Attending: EMERGENCY MEDICINE
Payer: OTHER MISCELLANEOUS

## 2024-12-01 VITALS
TEMPERATURE: 98.2 F | HEART RATE: 68 BPM | RESPIRATION RATE: 22 BRPM | OXYGEN SATURATION: 97 % | WEIGHT: 190 LBS | SYSTOLIC BLOOD PRESSURE: 120 MMHG | HEIGHT: 60 IN | BODY MASS INDEX: 37.3 KG/M2 | DIASTOLIC BLOOD PRESSURE: 70 MMHG

## 2024-12-01 DIAGNOSIS — S09.90XA CLOSED HEAD INJURY, INITIAL ENCOUNTER: Primary | ICD-10-CM
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
## 2024-12-01 LAB
ALBUMIN SERPL-MCNC: 4.1 G/DL (ref 3.5–5.2)
ALP SERPL-CCNC: 107 U/L (ref 35–104)
ALT SERPL-CCNC: 7 U/L (ref 10–35)
ANION GAP SERPL CALCULATED.3IONS-SCNC: 11 MMOL/L (ref 9–16)
AST SERPL-CCNC: 20 U/L (ref 10–35)
BACTERIA URNS QL MICRO: ABNORMAL
BASOPHILS # BLD: 0.1 K/UL (ref 0–0.2)
BASOPHILS NFR BLD: 1 % (ref 0–2)
BILIRUB SERPL-MCNC: 0.4 MG/DL (ref 0–1.2)
BILIRUB UR QL STRIP: NEGATIVE
BUN SERPL-MCNC: 15 MG/DL (ref 8–23)
CALCIUM SERPL-MCNC: 9.7 MG/DL (ref 8.6–10.4)
CASTS #/AREA URNS LPF: ABNORMAL /LPF
CHLORIDE SERPL-SCNC: 106 MMOL/L (ref 98–107)
CLARITY UR: CLEAR
CO2 SERPL-SCNC: 23 MMOL/L (ref 20–31)
COLOR UR: YELLOW
CREAT SERPL-MCNC: 0.8 MG/DL (ref 0.7–1.2)
EOSINOPHIL # BLD: 0.3 K/UL (ref 0–0.4)
EOSINOPHILS RELATIVE PERCENT: 3 % (ref 0–4)
EPI CELLS #/AREA URNS HPF: ABNORMAL /HPF
ERYTHROCYTE [DISTWIDTH] IN BLOOD BY AUTOMATED COUNT: 15.8 % (ref 11.5–14.9)
ETHANOL PERCENT: 0 %
ETHANOLAMINE SERPL-MCNC: <10 MG/DL (ref 0–0.08)
GFR, ESTIMATED: 83 ML/MIN/1.73M2
GLUCOSE SERPL-MCNC: 102 MG/DL (ref 74–99)
GLUCOSE UR STRIP-MCNC: NEGATIVE MG/DL
HCT VFR BLD AUTO: 42.3 % (ref 36–46)
HGB BLD-MCNC: 14.1 G/DL (ref 12–16)
HGB UR QL STRIP.AUTO: NEGATIVE
KETONES UR STRIP-MCNC: NEGATIVE MG/DL
LEUKOCYTE ESTERASE UR QL STRIP: ABNORMAL
LYMPHOCYTES NFR BLD: 2.5 K/UL (ref 1–4.8)
LYMPHOCYTES RELATIVE PERCENT: 26 % (ref 24–44)
MCH RBC QN AUTO: 28 PG (ref 26–34)
MCHC RBC AUTO-ENTMCNC: 33.2 G/DL (ref 31–37)
MCV RBC AUTO: 84.4 FL (ref 80–100)
MONOCYTES NFR BLD: 0.7 K/UL (ref 0.1–1.3)
MONOCYTES NFR BLD: 7 % (ref 1–7)
NEUTROPHILS NFR BLD: 63 % (ref 36–66)
NEUTS SEG NFR BLD: 6.2 K/UL (ref 1.3–9.1)
NITRITE UR QL STRIP: POSITIVE
PH UR STRIP: 5.5 [PH] (ref 5–8)
PLATELET # BLD AUTO: 342 K/UL (ref 150–450)
PMV BLD AUTO: 8.3 FL (ref 6–12)
POTASSIUM SERPL-SCNC: 4.4 MMOL/L (ref 3.7–5.3)
PROT SERPL-MCNC: 7.4 G/DL (ref 6.6–8.7)
PROT UR STRIP-MCNC: NEGATIVE MG/DL
RBC # BLD AUTO: 5.01 M/UL (ref 4–5.2)
RBC #/AREA URNS HPF: ABNORMAL /HPF
SODIUM SERPL-SCNC: 140 MMOL/L (ref 136–145)
SP GR UR STRIP: 1.03 (ref 1–1.03)
UROBILINOGEN UR STRIP-ACNC: NORMAL EU/DL (ref 0–1)
WBC #/AREA URNS HPF: ABNORMAL /HPF
WBC OTHER # BLD: 9.7 K/UL (ref 3.5–11)

## 2024-12-01 PROCEDURE — 85025 COMPLETE CBC W/AUTO DIFF WBC: CPT

## 2024-12-01 PROCEDURE — 70450 CT HEAD/BRAIN W/O DYE: CPT

## 2024-12-01 PROCEDURE — 80053 COMPREHEN METABOLIC PANEL: CPT

## 2024-12-01 PROCEDURE — G0480 DRUG TEST DEF 1-7 CLASSES: HCPCS

## 2024-12-01 PROCEDURE — 87088 URINE BACTERIA CULTURE: CPT

## 2024-12-01 PROCEDURE — 87186 SC STD MICRODIL/AGAR DIL: CPT

## 2024-12-01 PROCEDURE — 6360000004 HC RX CONTRAST MEDICATION: Performed by: EMERGENCY MEDICINE

## 2024-12-01 PROCEDURE — 96374 THER/PROPH/DIAG INJ IV PUSH: CPT

## 2024-12-01 PROCEDURE — 71260 CT THORAX DX C+: CPT

## 2024-12-01 PROCEDURE — 36415 COLL VENOUS BLD VENIPUNCTURE: CPT

## 2024-12-01 PROCEDURE — 87086 URINE CULTURE/COLONY COUNT: CPT

## 2024-12-01 PROCEDURE — 72125 CT NECK SPINE W/O DYE: CPT

## 2024-12-01 PROCEDURE — 99285 EMERGENCY DEPT VISIT HI MDM: CPT

## 2024-12-01 PROCEDURE — 71045 X-RAY EXAM CHEST 1 VIEW: CPT

## 2024-12-01 PROCEDURE — 6360000002 HC RX W HCPCS: Performed by: EMERGENCY MEDICINE

## 2024-12-01 PROCEDURE — 81001 URINALYSIS AUTO W/SCOPE: CPT

## 2024-12-01 PROCEDURE — 2580000003 HC RX 258: Performed by: EMERGENCY MEDICINE

## 2024-12-01 RX ORDER — IOPAMIDOL 755 MG/ML
100 INJECTION, SOLUTION INTRAVASCULAR
Status: COMPLETED | OUTPATIENT
Start: 2024-12-01 | End: 2024-12-01

## 2024-12-01 RX ORDER — SODIUM CHLORIDE 0.9 % (FLUSH) 0.9 %
10 SYRINGE (ML) INJECTION PRN
Status: DISCONTINUED | OUTPATIENT
Start: 2024-12-01 | End: 2024-12-01 | Stop reason: HOSPADM

## 2024-12-01 RX ORDER — 0.9 % SODIUM CHLORIDE 0.9 %
100 INTRAVENOUS SOLUTION INTRAVENOUS ONCE
Status: COMPLETED | OUTPATIENT
Start: 2024-12-01 | End: 2024-12-01

## 2024-12-01 RX ORDER — MORPHINE SULFATE 4 MG/ML
4 INJECTION, SOLUTION INTRAMUSCULAR; INTRAVENOUS ONCE
Status: COMPLETED | OUTPATIENT
Start: 2024-12-01 | End: 2024-12-01

## 2024-12-01 RX ORDER — CYCLOBENZAPRINE HCL 10 MG
10 TABLET ORAL 3 TIMES DAILY PRN
Qty: 21 TABLET | Refills: 0 | Status: SHIPPED | OUTPATIENT
Start: 2024-12-01 | End: 2024-12-08

## 2024-12-01 RX ADMIN — MORPHINE SULFATE 4 MG: 4 INJECTION, SOLUTION INTRAMUSCULAR; INTRAVENOUS at 16:03

## 2024-12-01 RX ADMIN — IOPAMIDOL 100 ML: 755 INJECTION, SOLUTION INTRAVENOUS at 17:25

## 2024-12-01 RX ADMIN — SODIUM CHLORIDE 100 ML: 9 INJECTION, SOLUTION INTRAVENOUS at 17:26

## 2024-12-01 RX ADMIN — SODIUM CHLORIDE, PRESERVATIVE FREE 10 ML: 5 INJECTION INTRAVENOUS at 17:26

## 2024-12-01 ASSESSMENT — PAIN SCALES - GENERAL: PAINLEVEL_OUTOF10: 8

## 2024-12-01 ASSESSMENT — PAIN DESCRIPTION - LOCATION: LOCATION: NECK;BACK
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
## 2024-12-03 DIAGNOSIS — N30.01 ACUTE CYSTITIS WITH HEMATURIA: Primary | ICD-10-CM

## 2024-12-03 LAB
MICROORGANISM SPEC CULT: ABNORMAL
SPECIMEN DESCRIPTION: ABNORMAL

## 2024-12-03 RX ORDER — CEPHALEXIN 500 MG/1
500 CAPSULE ORAL 2 TIMES DAILY
Qty: 20 CAPSULE | Refills: 0 | Status: SHIPPED | OUTPATIENT
Start: 2024-12-03

## 2024-12-05 ENCOUNTER — TELEPHONE (OUTPATIENT)
Dept: FAMILY MEDICINE CLINIC | Age: 64
End: 2024-12-05
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
## 2024-12-11 ENCOUNTER — HOSPITAL ENCOUNTER (OUTPATIENT)
Dept: WOMENS IMAGING | Age: 64
Discharge: HOME | End: 2024-12-13
Payer: COMMERCIAL

## 2024-12-11 DIAGNOSIS — Z12.31 ENCOUNTER FOR SCREENING MAMMOGRAM FOR HIGH-RISK PATIENT: ICD-10-CM

## 2024-12-11 PROCEDURE — 77063 BREAST TOMOSYNTHESIS BI: CPT

## 2025-01-13 ENCOUNTER — TELEPHONE (OUTPATIENT)
Dept: FAMILY MEDICINE CLINIC | Age: 65
End: 2025-01-13

## 2025-01-13 NOTE — TELEPHONE ENCOUNTER
Can we add her recent A1c results it has not been entered in the system, it was 6.  Also patient can keep checking her blood sugars and write down on the paper.  If is persistently running low, we can adjust her medications.

## 2025-01-13 NOTE — TELEPHONE ENCOUNTER
Patient called in she said her sugar was 56 this morning now its 49 , she's not sure if her reading is right or what to do, she's never had a reading this low. I told her to drink juice and put sugar in it she said she feels a little light headed she is drinking the juice now, please advise.

## 2025-01-20 RX ORDER — CARVEDILOL 12.5 MG/1
12.5 TABLET ORAL 2 TIMES DAILY WITH MEALS
Qty: 180 TABLET | Refills: 3 | Status: SHIPPED | OUTPATIENT
Start: 2025-01-20

## 2025-02-15 DIAGNOSIS — E11.9 TYPE 2 DIABETES MELLITUS WITHOUT COMPLICATION, WITHOUT LONG-TERM CURRENT USE OF INSULIN (HCC): ICD-10-CM

## 2025-02-17 NOTE — TELEPHONE ENCOUNTER
Please Approve or Refuse.  Send to Pharmacy per Pt's Request: BASHIR      Next Visit Date:  2/18/2025   Last Visit Date: 11/8/2024    Hemoglobin A1C (%)   Date Value   11/08/2024 6.0   07/17/2024 7.3 (A)   03/21/2024 7.1             ( goal A1C is < 7)   BP Readings from Last 3 Encounters:   12/01/24 120/70   11/08/24 108/60   07/17/24 106/78          (goal 120/80)  BUN   Date Value Ref Range Status   12/01/2024 15 8 - 23 mg/dL Final     Creatinine   Date Value Ref Range Status   12/01/2024 0.8 0.7 - 1.2 mg/dL Final     Potassium   Date Value Ref Range Status   12/01/2024 4.4 3.7 - 5.3 mmol/L Final

## 2025-02-18 ENCOUNTER — OFFICE VISIT (OUTPATIENT)
Dept: FAMILY MEDICINE CLINIC | Age: 65
End: 2025-02-18
Payer: COMMERCIAL

## 2025-02-18 VITALS
BODY MASS INDEX: 38.09 KG/M2 | WEIGHT: 194 LBS | DIASTOLIC BLOOD PRESSURE: 70 MMHG | OXYGEN SATURATION: 98 % | HEIGHT: 60 IN | HEART RATE: 78 BPM | SYSTOLIC BLOOD PRESSURE: 120 MMHG

## 2025-02-18 DIAGNOSIS — E11.9 TYPE 2 DIABETES MELLITUS WITHOUT COMPLICATION, WITHOUT LONG-TERM CURRENT USE OF INSULIN (HCC): Primary | ICD-10-CM

## 2025-02-18 DIAGNOSIS — E55.9 VITAMIN D DEFICIENCY: ICD-10-CM

## 2025-02-18 DIAGNOSIS — I10 ESSENTIAL HYPERTENSION: ICD-10-CM

## 2025-02-18 DIAGNOSIS — I50.22 CHRONIC SYSTOLIC (CONGESTIVE) HEART FAILURE (HCC): ICD-10-CM

## 2025-02-18 DIAGNOSIS — E66.01 CLASS 2 SEVERE OBESITY DUE TO EXCESS CALORIES WITH SERIOUS COMORBIDITY AND BODY MASS INDEX (BMI) OF 37.0 TO 37.9 IN ADULT: ICD-10-CM

## 2025-02-18 DIAGNOSIS — E66.812 CLASS 2 SEVERE OBESITY DUE TO EXCESS CALORIES WITH SERIOUS COMORBIDITY AND BODY MASS INDEX (BMI) OF 37.0 TO 37.9 IN ADULT: ICD-10-CM

## 2025-02-18 DIAGNOSIS — E78.2 MIXED HYPERLIPIDEMIA: ICD-10-CM

## 2025-02-18 DIAGNOSIS — E79.0 HYPERURICEMIA: ICD-10-CM

## 2025-02-18 LAB — HBA1C MFR BLD: 6.7 %

## 2025-02-18 PROCEDURE — 3078F DIAST BP <80 MM HG: CPT | Performed by: FAMILY MEDICINE

## 2025-02-18 PROCEDURE — 1036F TOBACCO NON-USER: CPT | Performed by: FAMILY MEDICINE

## 2025-02-18 PROCEDURE — G8417 CALC BMI ABV UP PARAM F/U: HCPCS | Performed by: FAMILY MEDICINE

## 2025-02-18 PROCEDURE — G2211 COMPLEX E/M VISIT ADD ON: HCPCS | Performed by: FAMILY MEDICINE

## 2025-02-18 PROCEDURE — 3074F SYST BP LT 130 MM HG: CPT | Performed by: FAMILY MEDICINE

## 2025-02-18 PROCEDURE — 3017F COLORECTAL CA SCREEN DOC REV: CPT | Performed by: FAMILY MEDICINE

## 2025-02-18 PROCEDURE — 2022F DILAT RTA XM EVC RTNOPTHY: CPT | Performed by: FAMILY MEDICINE

## 2025-02-18 PROCEDURE — 83036 HEMOGLOBIN GLYCOSYLATED A1C: CPT | Performed by: FAMILY MEDICINE

## 2025-02-18 PROCEDURE — 99214 OFFICE O/P EST MOD 30 MIN: CPT | Performed by: FAMILY MEDICINE

## 2025-02-18 PROCEDURE — 3044F HG A1C LEVEL LT 7.0%: CPT | Performed by: FAMILY MEDICINE

## 2025-02-18 PROCEDURE — G8427 DOCREV CUR MEDS BY ELIG CLIN: HCPCS | Performed by: FAMILY MEDICINE

## 2025-02-18 RX ORDER — GLIPIZIDE 5 MG/1
5 TABLET ORAL DAILY
Qty: 60 TABLET | Refills: 3 | Status: SHIPPED | OUTPATIENT
Start: 2025-02-18

## 2025-02-18 RX ORDER — LIRAGLUTIDE 6 MG/ML
0.6 INJECTION SUBCUTANEOUS DAILY
Qty: 3 ML | Refills: 0 | Status: SHIPPED | OUTPATIENT
Start: 2025-02-18

## 2025-02-18 SDOH — ECONOMIC STABILITY: FOOD INSECURITY: WITHIN THE PAST 12 MONTHS, THE FOOD YOU BOUGHT JUST DIDN'T LAST AND YOU DIDN'T HAVE MONEY TO GET MORE.: NEVER TRUE

## 2025-02-18 SDOH — ECONOMIC STABILITY: FOOD INSECURITY: WITHIN THE PAST 12 MONTHS, YOU WORRIED THAT YOUR FOOD WOULD RUN OUT BEFORE YOU GOT MONEY TO BUY MORE.: NEVER TRUE

## 2025-02-18 ASSESSMENT — ENCOUNTER SYMPTOMS
COUGH: 0
WHEEZING: 0
ABDOMINAL PAIN: 0
CONSTIPATION: 0
CHEST TIGHTNESS: 0
BLOOD IN STOOL: 0
TROUBLE SWALLOWING: 0
NAUSEA: 0
ABDOMINAL DISTENTION: 0
COLOR CHANGE: 0
BACK PAIN: 1
EYE REDNESS: 0
RECTAL PAIN: 0
STRIDOR: 0
SORE THROAT: 0
SHORTNESS OF BREATH: 0
SINUS PRESSURE: 0
RHINORRHEA: 0
DIARRHEA: 0
VOMITING: 0

## 2025-02-18 ASSESSMENT — PATIENT HEALTH QUESTIONNAIRE - PHQ9
2. FEELING DOWN, DEPRESSED OR HOPELESS: NOT AT ALL
SUM OF ALL RESPONSES TO PHQ QUESTIONS 1-9: 0
SUM OF ALL RESPONSES TO PHQ9 QUESTIONS 1 & 2: 0
SUM OF ALL RESPONSES TO PHQ QUESTIONS 1-9: 0
1. LITTLE INTEREST OR PLEASURE IN DOING THINGS: NOT AT ALL
SUM OF ALL RESPONSES TO PHQ QUESTIONS 1-9: 0
SUM OF ALL RESPONSES TO PHQ QUESTIONS 1-9: 0

## 2025-02-18 NOTE — PROGRESS NOTES
Visit Information    Have you changed or started any medications since your last visit including any over-the-counter medicines, vitamins, or herbal medicines? no   Are you having any side effects from any of your medications? -  no  Have you stopped taking any of your medications? Is so, why? -  no    Have you seen any other physician or provider since your last visit? No  Have you had any other diagnostic tests since your last visit? No  Have you been seen in the emergency room and/or had an admission to a hospital since we last saw you? No  Have you had your routine dental cleaning in the past 6 months? no    Have you activated your [employer] account? If not, what are your barriers? Yes     Patient Care Team:  Dominique Viveros MD as PCP - General (Family Medicine)  Dominique Viveros MD as PCP - Empaneled Provider  Dima Otero MD as Consulting Physician (Cardiology)    Medical History Review  Past Medical, Family, and Social History reviewed and does contribute to the patient presenting condition    Health Maintenance   Topic Date Due    Diabetic retinal exam  02/22/2020    Respiratory Syncytial Virus (RSV) Pregnant or age 60 yrs+ (1 - Risk 60-74 years 1-dose series) Never done    COVID-19 Vaccine (4 - 2024-25 season) 09/01/2024    Diabetic Alb to Cr ratio (uACR) test  04/13/2025    Lipids  04/13/2025    Diabetic foot exam  07/17/2025    Depression Screen  07/17/2025    A1C test (Diabetic or Prediabetic)  11/08/2025    GFR test (Diabetes, CKD 3-4, OR last GFR 15-59)  12/01/2025    Breast cancer screen  12/11/2026    Cervical cancer screen  03/21/2027    Colorectal Cancer Screen  10/12/2028    DTaP/Tdap/Td vaccine (2 - Td or Tdap) 04/01/2032    Flu vaccine  Completed    Shingles vaccine  Completed    Pneumococcal 50+ years Vaccine  Completed    Hepatitis C screen  Completed    HIV screen  Completed    Hepatitis A vaccine  Aged Out    Hepatitis B vaccine  Aged Out    Hib vaccine  Aged Out    Polio vaccine  Aged Out 
blood work this before his next appointment. Freestyle strips have been ordered for his blood glucose monitoring device. He will be started on a low dose of Victoza, which may eventually allow discontinuation of glipizide. Potential side effects of Victoza, including abdominal cramping and nausea, have been discussed.  Patient has tried Jardiance and could not tolerate.    2. Heart failure.  He is not currently taking furosemide 20 mg as previously prescribed. His blood pressure is running low, so Lasix will be discontinued. He will continue his current blood pressure medications. Jardiance 10 mg is recommended for heart failure as it helps improve heart function.    3. Urinary tract infection (UTI).  He had a UTI in December 2024. A urine test has been ordered to ensure the infection has resolved.    1. Type 2 diabetes mellitus without complication, without long-term current use of insulin ([facility])  A1c slightly increased, start on Victoza continue metformin and glipizide.  Monitor labs.  Continue to monitor blood sugars  - POCT glycosylated hemoglobin (Hb A1C)  - glipiZIDE (GLUCOTROL) 5 MG tablet; Take 1 tablet by mouth daily  Dispense: 60 tablet; Refill: 3  - Comprehensive Metabolic Panel; Future  - CBC with Auto Differential; Future  - Albumin/Creatinine Ratio, Urine; Future  - Uric Acid; Future  - TSH; Future  - Vitamin B12 & Folate; Future  - blood glucose test strips (ASCENSIA AUTODISC VI;ONE TOUCH ULTRA TEST VI) strip; 1 each by In Vitro route daily As needed.  Dispense: 100 each; Refill: 3  - Liraglutide (VICTOZA) 18 MG/3ML SOPN SC injection; Inject 0.6 mg into the skin daily  Dispense: 3 mL; Refill: 0  - Insulin Pen Needle 31G X 5 MM MISC; 1 each by Does not apply route daily TO BE USED with VICTOZA, DAILY  Dispense: 100 each; Refill: 3    2. Chronic systolic (congestive) heart failure (HCC)  Stable, no recent flareup.  Continue current medications continue Entresto monitor labs    3. Essential

## 2025-02-19 ENCOUNTER — TELEPHONE (OUTPATIENT)
Dept: FAMILY MEDICINE CLINIC | Age: 65
End: 2025-02-19

## 2025-02-19 NOTE — TELEPHONE ENCOUNTER
Received a fax from [employer] that Liraglutide is not covered by patients insurance. Please advise if you would like to change medication.

## 2025-02-19 NOTE — TELEPHONE ENCOUNTER
Notify patient Victoza is not covered by the insurance, she will continue her medications.  If she would like to call insurance and see if there is any other option for the GLP-1 agonist.

## 2025-03-07 DIAGNOSIS — E11.9 TYPE 2 DIABETES MELLITUS WITHOUT COMPLICATION, WITHOUT LONG-TERM CURRENT USE OF INSULIN (HCC): ICD-10-CM

## 2025-03-07 RX ORDER — LIRAGLUTIDE 6 MG/ML
0.6 INJECTION SUBCUTANEOUS DAILY
Qty: 3 ML | Refills: 0 | Status: SHIPPED | OUTPATIENT
Start: 2025-03-07

## 2025-03-07 NOTE — TELEPHONE ENCOUNTER
Please Approve or Refuse.  Send to Pharmacy per Pt's Request:      Next Visit Date:  5/19/2025   Last Visit Date: 2/18/2025    Hemoglobin A1C (%)   Date Value   02/18/2025 6.7   11/08/2024 6.0   07/17/2024 7.3 (A)             ( goal A1C is < 7)   BP Readings from Last 3 Encounters:   02/18/25 120/70   12/01/24 120/70   11/08/24 108/60          (goal 120/80)  BUN   Date Value Ref Range Status   12/01/2024 15 8 - 23 mg/dL Final     Creatinine   Date Value Ref Range Status   12/01/2024 0.8 0.7 - 1.2 mg/dL Final     Potassium   Date Value Ref Range Status   12/01/2024 4.4 3.7 - 5.3 mmol/L Final

## 2025-03-31 RX ORDER — ATORVASTATIN CALCIUM 20 MG/1
20 TABLET, FILM COATED ORAL DAILY
Qty: 90 TABLET | Refills: 1 | Status: SHIPPED | OUTPATIENT
Start: 2025-03-31

## 2025-04-11 ENCOUNTER — HOSPITAL ENCOUNTER (OUTPATIENT)
Age: 65
Discharge: HOME | End: 2025-04-11
Payer: COMMERCIAL

## 2025-04-11 ENCOUNTER — RESULTS FOLLOW-UP (OUTPATIENT)
Dept: FAMILY MEDICINE CLINIC | Age: 65
End: 2025-04-11

## 2025-04-11 DIAGNOSIS — E78.2 MIXED HYPERLIPIDEMIA: ICD-10-CM

## 2025-04-11 DIAGNOSIS — N30.01 ACUTE CYSTITIS WITH HEMATURIA: Primary | ICD-10-CM

## 2025-04-11 DIAGNOSIS — E11.9 TYPE 2 DIABETES MELLITUS WITHOUT COMPLICATION, WITHOUT LONG-TERM CURRENT USE OF INSULIN: ICD-10-CM

## 2025-04-11 DIAGNOSIS — I10 ESSENTIAL HYPERTENSION: ICD-10-CM

## 2025-04-11 LAB
ALBUMIN SERPL-MCNC: 4.1 G/DL (ref 3.5–5.2)
ALP SERPL-CCNC: 90 U/L (ref 35–104)
ALT SERPL-CCNC: 9 U/L (ref 10–35)
ANION GAP SERPL CALCULATED.3IONS-SCNC: 12 MMOL/L (ref 9–16)
AST SERPL-CCNC: 21 U/L (ref 10–35)
BACTERIA URNS QL MICRO: ABNORMAL
BASOPHILS # BLD: 0.1 K/UL (ref 0–0.2)
BASOPHILS NFR BLD: 2 % (ref 0–2)
BILIRUB SERPL-MCNC: 0.8 MG/DL (ref 0–1.2)
BILIRUB UR QL STRIP: NEGATIVE
BUN SERPL-MCNC: 14 MG/DL (ref 8–23)
CALCIUM SERPL-MCNC: 9.5 MG/DL (ref 8.6–10.4)
CASTS #/AREA URNS LPF: ABNORMAL /LPF
CHLORIDE SERPL-SCNC: 105 MMOL/L (ref 98–107)
CHOLEST SERPL-MCNC: 105 MG/DL (ref 0–199)
CHOLESTEROL/HDL RATIO: 3.1
CLARITY UR: ABNORMAL
CO2 SERPL-SCNC: 22 MMOL/L (ref 20–31)
COLOR UR: YELLOW
CREAT SERPL-MCNC: 0.7 MG/DL (ref 0.7–1.2)
CREAT UR-MCNC: 121 MG/DL (ref 28–217)
EOSINOPHIL # BLD: 0.2 K/UL (ref 0–0.4)
EOSINOPHILS RELATIVE PERCENT: 3 % (ref 0–4)
EPI CELLS #/AREA URNS HPF: ABNORMAL /HPF
ERYTHROCYTE [DISTWIDTH] IN BLOOD BY AUTOMATED COUNT: 14.8 % (ref 11.5–14.9)
FOLATE SERPL-MCNC: 15.1 NG/ML (ref 4.8–24.2)
GFR, ESTIMATED: >90 ML/MIN/1.73M2
GLUCOSE SERPL-MCNC: 140 MG/DL (ref 74–99)
GLUCOSE UR STRIP-MCNC: NEGATIVE MG/DL
HCT VFR BLD AUTO: 43.7 % (ref 36–46)
HDLC SERPL-MCNC: 34 MG/DL
HGB BLD-MCNC: 14.5 G/DL (ref 12–16)
HGB UR QL STRIP.AUTO: NEGATIVE
KETONES UR STRIP-MCNC: NEGATIVE MG/DL
LDLC SERPL CALC-MCNC: 53 MG/DL (ref 0–100)
LEUKOCYTE ESTERASE UR QL STRIP: ABNORMAL
LYMPHOCYTES NFR BLD: 1.9 K/UL (ref 1–4.8)
LYMPHOCYTES RELATIVE PERCENT: 28 % (ref 24–44)
MAGNESIUM SERPL-MCNC: 2 MG/DL (ref 1.6–2.4)
MCH RBC QN AUTO: 27.6 PG (ref 26–34)
MCHC RBC AUTO-ENTMCNC: 33.1 G/DL (ref 31–37)
MCV RBC AUTO: 83.2 FL (ref 80–100)
MICROALBUMIN UR-MCNC: <12 MG/L (ref 0–20)
MICROALBUMIN/CREAT UR-RTO: NORMAL MCG/MG CREAT (ref 0–25)
MONOCYTES NFR BLD: 0.4 K/UL (ref 0.1–1.3)
MONOCYTES NFR BLD: 6 % (ref 1–7)
NEUTROPHILS NFR BLD: 61 % (ref 36–66)
NEUTS SEG NFR BLD: 4.1 K/UL (ref 1.3–9.1)
NITRITE UR QL STRIP: NEGATIVE
PH UR STRIP: 5.5 [PH] (ref 5–8)
PLATELET # BLD AUTO: 288 K/UL (ref 150–450)
PMV BLD AUTO: 8 FL (ref 6–12)
POTASSIUM SERPL-SCNC: 4.5 MMOL/L (ref 3.7–5.3)
PROT SERPL-MCNC: 7.4 G/DL (ref 6.6–8.7)
PROT UR STRIP-MCNC: NEGATIVE MG/DL
RBC # BLD AUTO: 5.25 M/UL (ref 4–5.2)
RBC #/AREA URNS HPF: ABNORMAL /HPF
SODIUM SERPL-SCNC: 139 MMOL/L (ref 136–145)
SP GR UR STRIP: 1.02 (ref 1–1.03)
TRIGL SERPL-MCNC: 91 MG/DL (ref 0–149)
TSH SERPL DL<=0.05 MIU/L-ACNC: 0.74 UIU/ML (ref 0.27–4.2)
URATE SERPL-MCNC: 6.5 MG/DL (ref 2.4–5.7)
UROBILINOGEN UR STRIP-ACNC: NORMAL EU/DL (ref 0–1)
VIT B12 SERPL-MCNC: 637 PG/ML (ref 232–1245)
WBC #/AREA URNS HPF: ABNORMAL /HPF
WBC OTHER # BLD: 6.7 K/UL (ref 3.5–11)

## 2025-04-11 PROCEDURE — 80061 LIPID PANEL: CPT

## 2025-04-11 PROCEDURE — 84443 ASSAY THYROID STIM HORMONE: CPT

## 2025-04-11 PROCEDURE — 83735 ASSAY OF MAGNESIUM: CPT

## 2025-04-11 PROCEDURE — 82043 UR ALBUMIN QUANTITATIVE: CPT

## 2025-04-11 PROCEDURE — 81001 URINALYSIS AUTO W/SCOPE: CPT

## 2025-04-11 PROCEDURE — 85025 COMPLETE CBC W/AUTO DIFF WBC: CPT

## 2025-04-11 PROCEDURE — 84550 ASSAY OF BLOOD/URIC ACID: CPT

## 2025-04-11 PROCEDURE — 80053 COMPREHEN METABOLIC PANEL: CPT

## 2025-04-11 PROCEDURE — 82607 VITAMIN B-12: CPT

## 2025-04-11 PROCEDURE — 36415 COLL VENOUS BLD VENIPUNCTURE: CPT

## 2025-04-11 PROCEDURE — 87077 CULTURE AEROBIC IDENTIFY: CPT

## 2025-04-11 PROCEDURE — 87186 SC STD MICRODIL/AGAR DIL: CPT

## 2025-04-11 PROCEDURE — 82570 ASSAY OF URINE CREATININE: CPT

## 2025-04-11 PROCEDURE — 82746 ASSAY OF FOLIC ACID SERUM: CPT

## 2025-04-11 PROCEDURE — 87086 URINE CULTURE/COLONY COUNT: CPT

## 2025-04-13 LAB
MICROORGANISM SPEC CULT: ABNORMAL
SPECIMEN DESCRIPTION: ABNORMAL

## 2025-04-14 RX ORDER — NITROFURANTOIN 25; 75 MG/1; MG/1
100 CAPSULE ORAL 2 TIMES DAILY
Qty: 20 CAPSULE | Refills: 0 | Status: SHIPPED | OUTPATIENT
Start: 2025-04-14 | End: 2025-04-24

## 2025-05-02 DIAGNOSIS — E11.9 TYPE 2 DIABETES MELLITUS WITHOUT COMPLICATION, WITHOUT LONG-TERM CURRENT USE OF INSULIN (HCC): ICD-10-CM

## 2025-05-02 RX ORDER — LIRAGLUTIDE 6 MG/ML
INJECTION SUBCUTANEOUS
Qty: 3 ML | Refills: 0 | Status: SHIPPED | OUTPATIENT
Start: 2025-05-02

## 2025-05-02 NOTE — TELEPHONE ENCOUNTER
Please Approve or Refuse.  Send to Pharmacy per Pt's Request:      Next Visit Date:  6/3/2025   Last Visit Date: 2/18/2025    Hemoglobin A1C (%)   Date Value   02/18/2025 6.7   11/08/2024 6.0   07/17/2024 7.3 (A)             ( goal A1C is < 7)   BP Readings from Last 3 Encounters:   02/18/25 120/70   12/01/24 120/70   11/08/24 108/60          (goal 120/80)  BUN   Date Value Ref Range Status   04/11/2025 14 8 - 23 mg/dL Final     Creatinine   Date Value Ref Range Status   04/11/2025 0.7 0.7 - 1.2 mg/dL Final     Potassium   Date Value Ref Range Status   04/11/2025 4.5 3.7 - 5.3 mmol/L Final

## 2025-06-03 ENCOUNTER — OFFICE VISIT (OUTPATIENT)
Dept: FAMILY MEDICINE CLINIC | Age: 65
End: 2025-06-03
Payer: COMMERCIAL

## 2025-06-03 VITALS
HEIGHT: 60 IN | HEART RATE: 74 BPM | BODY MASS INDEX: 36.91 KG/M2 | WEIGHT: 188 LBS | OXYGEN SATURATION: 98 % | SYSTOLIC BLOOD PRESSURE: 130 MMHG | DIASTOLIC BLOOD PRESSURE: 80 MMHG

## 2025-06-03 DIAGNOSIS — I50.22 CHRONIC SYSTOLIC (CONGESTIVE) HEART FAILURE (HCC): ICD-10-CM

## 2025-06-03 DIAGNOSIS — E11.9 TYPE 2 DIABETES MELLITUS WITHOUT COMPLICATION, WITHOUT LONG-TERM CURRENT USE OF INSULIN (HCC): Primary | ICD-10-CM

## 2025-06-03 DIAGNOSIS — E78.2 MIXED HYPERLIPIDEMIA: ICD-10-CM

## 2025-06-03 DIAGNOSIS — E66.812 CLASS 2 SEVERE OBESITY WITH SERIOUS COMORBIDITY AND BODY MASS INDEX (BMI) OF 36.0 TO 36.9 IN ADULT, UNSPECIFIED OBESITY TYPE (HCC): ICD-10-CM

## 2025-06-03 DIAGNOSIS — E66.01 CLASS 2 SEVERE OBESITY WITH SERIOUS COMORBIDITY AND BODY MASS INDEX (BMI) OF 36.0 TO 36.9 IN ADULT, UNSPECIFIED OBESITY TYPE (HCC): ICD-10-CM

## 2025-06-03 DIAGNOSIS — I10 ESSENTIAL HYPERTENSION: ICD-10-CM

## 2025-06-03 LAB — HBA1C MFR BLD: 6 %

## 2025-06-03 PROCEDURE — 3017F COLORECTAL CA SCREEN DOC REV: CPT | Performed by: FAMILY MEDICINE

## 2025-06-03 PROCEDURE — G8427 DOCREV CUR MEDS BY ELIG CLIN: HCPCS | Performed by: FAMILY MEDICINE

## 2025-06-03 PROCEDURE — G8417 CALC BMI ABV UP PARAM F/U: HCPCS | Performed by: FAMILY MEDICINE

## 2025-06-03 PROCEDURE — 3044F HG A1C LEVEL LT 7.0%: CPT | Performed by: FAMILY MEDICINE

## 2025-06-03 PROCEDURE — 3075F SYST BP GE 130 - 139MM HG: CPT | Performed by: FAMILY MEDICINE

## 2025-06-03 PROCEDURE — 1036F TOBACCO NON-USER: CPT | Performed by: FAMILY MEDICINE

## 2025-06-03 PROCEDURE — 83036 HEMOGLOBIN GLYCOSYLATED A1C: CPT | Performed by: FAMILY MEDICINE

## 2025-06-03 PROCEDURE — 99214 OFFICE O/P EST MOD 30 MIN: CPT | Performed by: FAMILY MEDICINE

## 2025-06-03 PROCEDURE — 2022F DILAT RTA XM EVC RTNOPTHY: CPT | Performed by: FAMILY MEDICINE

## 2025-06-03 PROCEDURE — 3079F DIAST BP 80-89 MM HG: CPT | Performed by: FAMILY MEDICINE

## 2025-06-03 SDOH — ECONOMIC STABILITY: FOOD INSECURITY: WITHIN THE PAST 12 MONTHS, THE FOOD YOU BOUGHT JUST DIDN'T LAST AND YOU DIDN'T HAVE MONEY TO GET MORE.: NEVER TRUE

## 2025-06-03 SDOH — ECONOMIC STABILITY: FOOD INSECURITY: WITHIN THE PAST 12 MONTHS, YOU WORRIED THAT YOUR FOOD WOULD RUN OUT BEFORE YOU GOT MONEY TO BUY MORE.: NEVER TRUE

## 2025-06-03 ASSESSMENT — ENCOUNTER SYMPTOMS
RHINORRHEA: 0
TROUBLE SWALLOWING: 0
SINUS PRESSURE: 0
DIARRHEA: 0
STRIDOR: 0
VOMITING: 0
SORE THROAT: 0
CHEST TIGHTNESS: 0
EYE REDNESS: 0
ABDOMINAL DISTENTION: 0
WHEEZING: 0
SHORTNESS OF BREATH: 0
NAUSEA: 0
RECTAL PAIN: 0
COUGH: 0
CONSTIPATION: 0
ABDOMINAL PAIN: 0
BACK PAIN: 1
BLOOD IN STOOL: 0
COLOR CHANGE: 0

## 2025-06-03 ASSESSMENT — PATIENT HEALTH QUESTIONNAIRE - PHQ9
SUM OF ALL RESPONSES TO PHQ QUESTIONS 1-9: 0
SUM OF ALL RESPONSES TO PHQ QUESTIONS 1-9: 0
1. LITTLE INTEREST OR PLEASURE IN DOING THINGS: NOT AT ALL
SUM OF ALL RESPONSES TO PHQ QUESTIONS 1-9: 0
2. FEELING DOWN, DEPRESSED OR HOPELESS: NOT AT ALL
SUM OF ALL RESPONSES TO PHQ QUESTIONS 1-9: 0

## 2025-06-03 NOTE — PROGRESS NOTES
Chief Complaint   Patient presents with    Diabetes     The patient (or guardian, if applicable) and other individuals in attendance with the patient were advised that Artificial Intelligence will be utilized during this visit to record, process the conversation to generate a clinical note, and support improvement of the AI technology. The patient (or guardian, if applicable) and other individuals in attendance at the appointment consented to the use of AI, including the recording.                    Diabetes      History of Present Illness  The patient presents for a 3-month follow-up on chronic medical conditions, including type 2 diabetes.    She reports satisfactory blood glucose levels, with readings typically ranging between 147 and 190. Blood glucose is monitored around 10:00 AM during work hours. She experiences morning sickness and dizziness, which she attributes to her medication intake. The current medication regimen includes Entresto taken at 9:00 AM, other medications administered at 6:00 AM, and Victoza at 5:30 AM. No adverse effects from medications are reported, and weight loss has been observed since starting Victoza. No hyperglycemic episodes with blood glucose levels exceeding 200 have been experienced.    She has not consulted an ophthalmologist since her last visit in 2024, during which she was advised to use reading glasses. No episodes of shortness of breath are reported.    She recently consulted a cardiologist and was informed that her cardiac health is stable for the next year.    She had a urinary tract infection (UTI) and took antibiotics. She currently has no symptoms.        Results  Labs   - Blood sugar: Readings between 147 and 190   - A1c: 6, down from 6.7   - Thyroid function test: Normal   - Vitamin B12: Normal   - Folate: Normal   - Cholesterol: Normal   - Magnesium: Normal   - Complete Blood Count (CBC): Normal   - Kidney function test: Normal   - Liver function test: Normal   -

## 2025-06-03 NOTE — PROGRESS NOTES
Visit Information    Have you changed or started any medications since your last visit including any over-the-counter medicines, vitamins, or herbal medicines? no   Are you having any side effects from any of your medications? -  no  Have you stopped taking any of your medications? Is so, why? -  no    Have you seen any other physician or provider since your last visit? No  Have you had any other diagnostic tests since your last visit? No  Have you been seen in the emergency room and/or had an admission to a hospital since we last saw you? No  Have you had your routine dental cleaning in the past 6 months? no    Have you activated your [employer] account? If not, what are your barriers? Yes     Patient Care Team:  Dominique Viveros MD as PCP - General (Family Medicine)  Dominique Viveros MD as PCP - Empaneled Provider  Dima Otero MD as Consulting Physician (Cardiology)    Medical History Review  Past Medical, Family, and Social History reviewed and does contribute to the patient presenting condition    Health Maintenance   Topic Date Due    Diabetic retinal exam  02/22/2020    Respiratory Syncytial Virus (RSV) Pregnant or age 60 yrs+ (1 - Risk 60-74 years 1-dose series) Never done    COVID-19 Vaccine (4 - 2024-25 season) 09/01/2024    A1C test (Diabetic or Prediabetic)  05/18/2025    Diabetic foot exam  07/17/2025    Depression Screen  02/18/2026    Diabetic Alb to Cr ratio (uACR) test  04/11/2026    Lipids  04/11/2026    GFR test (Diabetes, CKD 3-4, OR last GFR 15-59)  04/11/2026    Breast cancer screen  12/11/2026    Cervical cancer screen  03/21/2027    Colorectal Cancer Screen  10/12/2028    DTaP/Tdap/Td vaccine (2 - Td or Tdap) 04/01/2032    Flu vaccine  Completed    Shingles vaccine  Completed    Pneumococcal 50+ years Vaccine  Completed    Hepatitis C screen  Completed    HIV screen  Completed    Hepatitis A vaccine  Aged Out    Hepatitis B vaccine  Aged Out    Hib vaccine  Aged Out    Polio vaccine  Aged Out

## 2025-07-01 ENCOUNTER — TELEPHONE (OUTPATIENT)
Dept: PRIMARY CARE CLINIC | Age: 65
End: 2025-07-01

## 2025-07-01 DIAGNOSIS — I50.22 CHRONIC SYSTOLIC (CONGESTIVE) HEART FAILURE (HCC): Primary | ICD-10-CM

## 2025-07-01 RX ORDER — SPIRONOLACTONE 25 MG/1
25 TABLET ORAL DAILY
Qty: 30 TABLET | Refills: 3 | Status: SHIPPED | OUTPATIENT
Start: 2025-07-01

## 2025-07-01 NOTE — TELEPHONE ENCOUNTER
Our records indicate that Hunter Yang may benefit from medication optimization to meet the three pillars of CHF care (ACE/ARB/ARNI, Beta Blocker, and MRA therapy). The ask is to change the patient's medications below, as appropriate. If the patient is being followed by a cardiologist, please ensure they have a follow up appointment with that provider to discuss further.     The measure definition, denominator, inclusions, and exclusions are below:

## 2025-07-01 NOTE — TELEPHONE ENCOUNTER
Notify patient I started her on Aldactone 25 mg since he has history of CHF that will improve the cardiac status, we had to keep eye on the potassium I ordered the BMP make sure you do that test in a month.  Keep appointment as scheduled.

## 2025-09-03 ENCOUNTER — OFFICE VISIT (OUTPATIENT)
Dept: FAMILY MEDICINE CLINIC | Age: 65
End: 2025-09-03
Payer: COMMERCIAL

## 2025-09-03 VITALS
HEART RATE: 64 BPM | BODY MASS INDEX: 36.91 KG/M2 | WEIGHT: 188 LBS | DIASTOLIC BLOOD PRESSURE: 70 MMHG | SYSTOLIC BLOOD PRESSURE: 110 MMHG | HEIGHT: 60 IN | OXYGEN SATURATION: 98 %

## 2025-09-03 DIAGNOSIS — E11.9 TYPE 2 DIABETES MELLITUS WITHOUT COMPLICATION, WITHOUT LONG-TERM CURRENT USE OF INSULIN (HCC): Primary | ICD-10-CM

## 2025-09-03 DIAGNOSIS — E78.2 MIXED HYPERLIPIDEMIA: ICD-10-CM

## 2025-09-03 DIAGNOSIS — E66.01 CLASS 2 SEVERE OBESITY DUE TO EXCESS CALORIES WITH SERIOUS COMORBIDITY AND BODY MASS INDEX (BMI) OF 36.0 TO 36.9 IN ADULT (HCC): ICD-10-CM

## 2025-09-03 DIAGNOSIS — E66.812 CLASS 2 SEVERE OBESITY DUE TO EXCESS CALORIES WITH SERIOUS COMORBIDITY AND BODY MASS INDEX (BMI) OF 36.0 TO 36.9 IN ADULT (HCC): ICD-10-CM

## 2025-09-03 DIAGNOSIS — E79.0 HYPERURICEMIA: ICD-10-CM

## 2025-09-03 DIAGNOSIS — I50.22 CHRONIC SYSTOLIC (CONGESTIVE) HEART FAILURE (HCC): ICD-10-CM

## 2025-09-03 DIAGNOSIS — Z12.31 ENCOUNTER FOR SCREENING MAMMOGRAM FOR HIGH-RISK PATIENT: ICD-10-CM

## 2025-09-03 DIAGNOSIS — I10 ESSENTIAL HYPERTENSION: ICD-10-CM

## 2025-09-03 DIAGNOSIS — E55.9 VITAMIN D DEFICIENCY: ICD-10-CM

## 2025-09-03 LAB — HBA1C MFR BLD: 5.9 %

## 2025-09-03 PROCEDURE — G8417 CALC BMI ABV UP PARAM F/U: HCPCS | Performed by: FAMILY MEDICINE

## 2025-09-03 PROCEDURE — G8427 DOCREV CUR MEDS BY ELIG CLIN: HCPCS | Performed by: FAMILY MEDICINE

## 2025-09-03 PROCEDURE — 3017F COLORECTAL CA SCREEN DOC REV: CPT | Performed by: FAMILY MEDICINE

## 2025-09-03 PROCEDURE — 99214 OFFICE O/P EST MOD 30 MIN: CPT | Performed by: FAMILY MEDICINE

## 2025-09-03 PROCEDURE — 83036 HEMOGLOBIN GLYCOSYLATED A1C: CPT | Performed by: FAMILY MEDICINE

## 2025-09-03 PROCEDURE — 3044F HG A1C LEVEL LT 7.0%: CPT | Performed by: FAMILY MEDICINE

## 2025-09-03 PROCEDURE — 1036F TOBACCO NON-USER: CPT | Performed by: FAMILY MEDICINE

## 2025-09-03 PROCEDURE — 2022F DILAT RTA XM EVC RTNOPTHY: CPT | Performed by: FAMILY MEDICINE

## 2025-09-03 PROCEDURE — 3074F SYST BP LT 130 MM HG: CPT | Performed by: FAMILY MEDICINE

## 2025-09-03 PROCEDURE — 3078F DIAST BP <80 MM HG: CPT | Performed by: FAMILY MEDICINE

## 2025-09-03 ASSESSMENT — ENCOUNTER SYMPTOMS
SORE THROAT: 0
BACK PAIN: 0
CHEST TIGHTNESS: 0
CONSTIPATION: 0
EYE REDNESS: 0
SINUS PRESSURE: 0
COUGH: 0
ABDOMINAL PAIN: 0
RECTAL PAIN: 0
BLOOD IN STOOL: 0
DIARRHEA: 0
NAUSEA: 0
COLOR CHANGE: 0
VOMITING: 0
RHINORRHEA: 0
TROUBLE SWALLOWING: 0
WHEEZING: 0
SHORTNESS OF BREATH: 0
STRIDOR: 0

## 2025-09-03 ASSESSMENT — PATIENT HEALTH QUESTIONNAIRE - PHQ9
SUM OF ALL RESPONSES TO PHQ QUESTIONS 1-9: 0
1. LITTLE INTEREST OR PLEASURE IN DOING THINGS: NOT AT ALL
2. FEELING DOWN, DEPRESSED OR HOPELESS: NOT AT ALL
SUM OF ALL RESPONSES TO PHQ QUESTIONS 1-9: 0

## 2025-09-05 ENCOUNTER — HOSPITAL ENCOUNTER (OUTPATIENT)
Dept: LAB | Age: 65
Discharge: HOME | End: 2025-09-05
Payer: COMMERCIAL

## 2025-09-05 DIAGNOSIS — I50.22 CHRONIC SYSTOLIC (CONGESTIVE) HEART FAILURE (HCC): ICD-10-CM

## 2025-09-05 LAB
ANION GAP SERPL CALCULATED.3IONS-SCNC: 13 MMOL/L (ref 9–16)
BUN SERPL-MCNC: 12 MG/DL (ref 8–23)
CALCIUM SERPL-MCNC: 9.9 MG/DL (ref 8.6–10.4)
CHLORIDE SERPL-SCNC: 103 MMOL/L (ref 98–107)
CO2 SERPL-SCNC: 22 MMOL/L (ref 20–31)
CREAT SERPL-MCNC: 0.7 MG/DL (ref 0.7–1.2)
GFR, ESTIMATED: >90 ML/MIN/1.73M2
GLUCOSE SERPL-MCNC: 147 MG/DL (ref 74–99)
POTASSIUM SERPL-SCNC: 4.4 MMOL/L (ref 3.7–5.3)
SODIUM SERPL-SCNC: 138 MMOL/L (ref 136–145)

## 2025-09-05 PROCEDURE — 80048 BASIC METABOLIC PNL TOTAL CA: CPT

## 2025-09-05 PROCEDURE — 36415 COLL VENOUS BLD VENIPUNCTURE: CPT

## (undated) DEVICE — Z DUPLICATE USE 2527422 TUBING O2 STD 7 FT EXTN NO CRUSH VISUAL CNTRST LF

## (undated) DEVICE — DEFENDO AIR WATER SUCTION AND BIOPSY VALVE KIT FOR  OLYMPUS: Brand: DEFENDO AIR/WATER/SUCTION AND BIOPSY VALVE

## (undated) DEVICE — GOWN,POLY REINFORCED,LG: Brand: MEDLINE

## (undated) DEVICE — GLOVE ORANGE PI 7   MSG9070

## (undated) DEVICE — Z DISCONTINUED USE 2424143 ADAPTER O2 SWVL CHRISTMAS TREE GRN

## (undated) DEVICE — CANNULA NSL AD TBNG L7FT PVC STR NONFLARED PRNG O2 DEL W STD

## (undated) DEVICE — Z INACTIVE USE 2525529 CONNECTOR TBNG FOR O2

## (undated) DEVICE — JELLY,LUBE,STERILE,FLIP TOP,TUBE,2-OZ: Brand: MEDLINE